# Patient Record
Sex: FEMALE | Race: WHITE | Employment: OTHER | URBAN - METROPOLITAN AREA
[De-identification: names, ages, dates, MRNs, and addresses within clinical notes are randomized per-mention and may not be internally consistent; named-entity substitution may affect disease eponyms.]

---

## 2015-11-09 LAB
CREATININE, EXTERNAL: 1.62
MICROALBUMIN UR TEST STR-MCNC: 9.8 MG/DL

## 2017-02-09 DIAGNOSIS — Z12.39 SCREENING FOR MALIGNANT NEOPLASM OF BREAST: ICD-10-CM

## 2017-02-21 ENCOUNTER — HOSPITAL ENCOUNTER (OUTPATIENT)
Dept: LAB | Age: 57
Discharge: HOME | End: 2017-02-21
Payer: COMMERCIAL

## 2017-02-21 ENCOUNTER — LAB ONLY (OUTPATIENT)
Dept: INTERNAL MEDICINE CLINIC | Age: 57
End: 2017-02-21

## 2017-02-21 DIAGNOSIS — G47.33 OBSTRUCTIVE SLEEP APNEA SYNDROME, MODERATE: ICD-10-CM

## 2017-02-21 DIAGNOSIS — N18.31 CHRONIC KIDNEY DISEASE (CKD) STAGE G3A/A1, MODERATELY DECREASED GLOMERULAR FILTRATION RATE (GFR) BETWEEN 45-59 ML/MIN/1.73 SQUARE METER AND ALBUMINURIA CREATININE RATIO LESS THAN 30 MG/G (HCC): ICD-10-CM

## 2017-02-21 DIAGNOSIS — E05.20 TOXIC MULTINODULAR GOITER: ICD-10-CM

## 2017-02-21 DIAGNOSIS — E11.21 TYPE 2 DIABETES MELLITUS WITH DIABETIC NEPHROPATHY, WITHOUT LONG-TERM CURRENT USE OF INSULIN (HCC): ICD-10-CM

## 2017-02-21 LAB
ALBUMIN SERPL BCP-MCNC: 3.8 G/DL (ref 3.4–5)
ALBUMIN/GLOB SERPL: 1.4 {RATIO} (ref 0.8–1.7)
ALP SERPL-CCNC: 239 U/L (ref 45–117)
ALT SERPL-CCNC: 48 U/L (ref 13–56)
ANION GAP BLD CALC-SCNC: 11 MMOL/L (ref 3–18)
APPEARANCE UR: CLEAR
AST SERPL W P-5'-P-CCNC: 31 U/L (ref 15–37)
BACTERIA URNS QL MICRO: ABNORMAL /HPF
BASOPHILS # BLD AUTO: 0.1 K/UL (ref 0–0.06)
BASOPHILS # BLD: 1 % (ref 0–2)
BILIRUB SERPL-MCNC: 0.5 MG/DL (ref 0.2–1)
BILIRUB UR QL: NEGATIVE
BUN SERPL-MCNC: 35 MG/DL (ref 7–18)
BUN/CREAT SERPL: 19 (ref 12–20)
CALCIUM SERPL-MCNC: 8.8 MG/DL (ref 8.5–10.1)
CHLORIDE SERPL-SCNC: 109 MMOL/L (ref 100–108)
CHOLEST SERPL-MCNC: 148 MG/DL
CO2 SERPL-SCNC: 21 MMOL/L (ref 21–32)
COLOR UR: YELLOW
CREAT SERPL-MCNC: 1.84 MG/DL (ref 0.6–1.3)
DIFFERENTIAL METHOD BLD: ABNORMAL
EOSINOPHIL # BLD: 0.1 K/UL (ref 0–0.4)
EOSINOPHIL NFR BLD: 3 % (ref 0–5)
EPITH CASTS URNS QL MICRO: ABNORMAL /LPF (ref 0–5)
ERYTHROCYTE [DISTWIDTH] IN BLOOD BY AUTOMATED COUNT: 14.6 % (ref 11.6–14.5)
EST. AVERAGE GLUCOSE BLD GHB EST-MCNC: 120 MG/DL
GLOBULIN SER CALC-MCNC: 2.7 G/DL (ref 2–4)
GLUCOSE SERPL-MCNC: 97 MG/DL (ref 74–99)
GLUCOSE UR STRIP.AUTO-MCNC: NEGATIVE MG/DL
HBA1C MFR BLD: 5.8 % (ref 4.2–5.6)
HCT VFR BLD AUTO: 39.8 % (ref 35–45)
HDLC SERPL-MCNC: 42 MG/DL (ref 40–60)
HDLC SERPL: 3.5 {RATIO} (ref 0–5)
HGB BLD-MCNC: 12.6 G/DL (ref 12–16)
HGB UR QL STRIP: NEGATIVE
KETONES UR QL STRIP.AUTO: NEGATIVE MG/DL
LDLC SERPL CALC-MCNC: 89.8 MG/DL (ref 0–100)
LEUKOCYTE ESTERASE UR QL STRIP.AUTO: ABNORMAL
LIPID PROFILE,FLP: NORMAL
LYMPHOCYTES # BLD AUTO: 25 % (ref 21–52)
LYMPHOCYTES # BLD: 1.2 K/UL (ref 0.9–3.6)
MCH RBC QN AUTO: 29 PG (ref 24–34)
MCHC RBC AUTO-ENTMCNC: 31.7 G/DL (ref 31–37)
MCV RBC AUTO: 91.5 FL (ref 74–97)
MONOCYTES # BLD: 0.3 K/UL (ref 0.05–1.2)
MONOCYTES NFR BLD AUTO: 6 % (ref 3–10)
NEUTS SEG # BLD: 3.2 K/UL (ref 1.8–8)
NEUTS SEG NFR BLD AUTO: 65 % (ref 40–73)
NITRITE UR QL STRIP.AUTO: NEGATIVE
PH UR STRIP: 5.5 [PH] (ref 5–8)
PLATELET # BLD AUTO: 220 K/UL (ref 135–420)
PMV BLD AUTO: 9.9 FL (ref 9.2–11.8)
POTASSIUM SERPL-SCNC: 4.3 MMOL/L (ref 3.5–5.5)
PROT SERPL-MCNC: 6.5 G/DL (ref 6.4–8.2)
PROT UR STRIP-MCNC: NEGATIVE MG/DL
RBC # BLD AUTO: 4.35 M/UL (ref 4.2–5.3)
RBC #/AREA URNS HPF: ABNORMAL /HPF (ref 0–5)
SODIUM SERPL-SCNC: 141 MMOL/L (ref 136–145)
SP GR UR REFRACTOMETRY: 1.01 (ref 1–1.03)
TRIGL SERPL-MCNC: 81 MG/DL (ref ?–150)
TSH SERPL DL<=0.05 MIU/L-ACNC: 0.16 UIU/ML (ref 0.36–3.74)
UROBILINOGEN UR QL STRIP.AUTO: 0.2 EU/DL (ref 0.2–1)
VLDLC SERPL CALC-MCNC: 16.2 MG/DL
WBC # BLD AUTO: 4.8 K/UL (ref 4.6–13.2)
WBC URNS QL MICRO: ABNORMAL /HPF (ref 0–4)

## 2017-02-21 PROCEDURE — 84443 ASSAY THYROID STIM HORMONE: CPT | Performed by: FAMILY MEDICINE

## 2017-02-21 PROCEDURE — 80053 COMPREHEN METABOLIC PANEL: CPT | Performed by: FAMILY MEDICINE

## 2017-02-21 PROCEDURE — 36415 COLL VENOUS BLD VENIPUNCTURE: CPT | Performed by: FAMILY MEDICINE

## 2017-02-21 PROCEDURE — 82043 UR ALBUMIN QUANTITATIVE: CPT | Performed by: FAMILY MEDICINE

## 2017-02-21 PROCEDURE — 83036 HEMOGLOBIN GLYCOSYLATED A1C: CPT | Performed by: FAMILY MEDICINE

## 2017-02-21 PROCEDURE — 81001 URINALYSIS AUTO W/SCOPE: CPT | Performed by: FAMILY MEDICINE

## 2017-02-21 PROCEDURE — 80061 LIPID PANEL: CPT | Performed by: FAMILY MEDICINE

## 2017-02-21 PROCEDURE — 85025 COMPLETE CBC W/AUTO DIFF WBC: CPT | Performed by: FAMILY MEDICINE

## 2017-02-22 LAB
CREAT UR-MCNC: 70.41 MG/DL (ref 30–125)
CREATININE, EXTERNAL: 1.79
MICROALBUMIN UR-MCNC: 5.4 MG/DL (ref 0–3)
MICROALBUMIN/CREAT UR-RTO: 77 MG/G (ref 0–30)

## 2017-03-27 ENCOUNTER — HOSPITAL ENCOUNTER (EMERGENCY)
Age: 57
Discharge: HOME | End: 2017-03-27
Attending: EMERGENCY MEDICINE
Payer: COMMERCIAL

## 2017-03-27 ENCOUNTER — APPOINTMENT (OUTPATIENT)
Dept: GENERAL RADIOLOGY | Age: 57
End: 2017-03-27
Attending: EMERGENCY MEDICINE
Payer: COMMERCIAL

## 2017-03-27 VITALS
WEIGHT: 160 LBS | SYSTOLIC BLOOD PRESSURE: 134 MMHG | DIASTOLIC BLOOD PRESSURE: 87 MMHG | HEIGHT: 66 IN | TEMPERATURE: 98 F | OXYGEN SATURATION: 98 % | RESPIRATION RATE: 18 BRPM | HEART RATE: 73 BPM | BODY MASS INDEX: 25.71 KG/M2

## 2017-03-27 DIAGNOSIS — M77.9 BONE SPUR: Primary | ICD-10-CM

## 2017-03-27 DIAGNOSIS — M79.641 HAND PAIN, RIGHT: ICD-10-CM

## 2017-03-27 PROCEDURE — 99282 EMERGENCY DEPT VISIT SF MDM: CPT

## 2017-03-27 PROCEDURE — 73130 X-RAY EXAM OF HAND: CPT

## 2017-03-27 RX ORDER — FUROSEMIDE 20 MG/1
TABLET ORAL
COMMUNITY
End: 2017-04-05 | Stop reason: ALTCHOICE

## 2017-03-27 NOTE — DISCHARGE INSTRUCTIONS
Hand Pain: Care Instructions  Your Care Instructions  Common causes of hand pain are overuse and injuries, such as might happen during sports or home repair projects. Everyday wear and tear, especially as you get older, also can cause hand pain. Most minor hand injuries will heal on their own, and home treatment is usually all you need to do. If you have sudden and severe pain, you may need tests and treatment. Follow-up care is a key part of your treatment and safety. Be sure to make and go to all appointments, and call your doctor if you are having problems. Its also a good idea to know your test results and keep a list of the medicines you take. How can you care for yourself at home? · Take pain medicines exactly as directed. ¨ If the doctor gave you a prescription medicine for pain, take it as prescribed. ¨ If you are not taking a prescription pain medicine, ask your doctor if you can take an over-the-counter medicine. · Rest and protect your hand. Take a break from any activity that may cause pain. · Put ice or a cold pack on your hand for 10 to 20 minutes at a time. Put a thin cloth between the ice and your skin. · Prop up the sore hand on a pillow when you ice it or anytime you sit or lie down during the next 3 days. Try to keep it above the level of your heart. This will help reduce swelling. · If your doctor recommends a sling, splint, or elastic bandage to support your hand, wear it as directed. When should you call for help? Call 911 anytime you think you may need emergency care. For example, call if:  · Your hand turns cool or pale or changes color. Call your doctor now or seek immediate medical care if:  · You cannot move your hand. · Your hand pops, moves out of its normal position, and then returns to its normal position. · You have signs of infection, such as:  ¨ Increased pain, swelling, warmth, or redness. ¨ Red streaks leading from the sore area.   ¨ Pus draining from a place on your hand. ¨ A fever. · Your hand feels numb or tingly. Watch closely for changes in your health, and be sure to contact your doctor if:  · Your hand feels unstable when you try to use it. · You do not get better as expected. · You have any new symptoms, such as swelling. · Bruises from an injury to your hand last longer than 2 weeks. Where can you learn more? Go to http://birdie-juany.info/. Enter R273 in the search box to learn more about \"Hand Pain: Care Instructions. \"  Current as of: May 27, 2016  Content Version: 11.1  © 0354-1652 [employer]. Care instructions adapted under license by [employer] (which disclaims liability or warranty for this information). If you have questions about a medical condition or this instruction, always ask your healthcare professional. [address] any warranty or liability for your use of this information.

## 2017-03-27 NOTE — ED PROVIDER NOTES
HPI Comments: 11:09 AM Ria Mcginnis is a 61 y.o. female with a history of heart disease, R breast cancer, CHF, renal mass, renal failure and gout who presents to ED c/o persistent right hand pain onset 4 weeks ago. Pt explains she has been resting her hand and refraining from repetat faby work but her hand pain continues to worsen and \"there is a big knot  that seems to be getting bigger. \"   Pt reports numbness and tingling. Pt has never experieced symptoms like this in the past and has not seen a doctor for these symptoms. Pt explains \"sometimes my thumb gets stuck in a positon and I have to pull it out. It feels like it catches and I can hear it click. 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 Pt has not taken any medication for the sxs but notes wearing a hand brace that provided no relief. Pt denies recent injury or trauma to hand. No other concerns at this time. PCP: Robby Vasques DO      Patient is a 61 y.o. female presenting with hand pain. Hand Pain    Associated symptoms include numbness.         Past Medical History:   Diagnosis Date    Cancer [facility]) 2000    Right breast ca     Congestive heart failure, unspecified     Gout     H/O total mastectomy of right breast     Heart disease     Hx: UTI (urinary tract infection)     Hypercholesterolemia     Renal cell carcinoma of left kidney ([facility] [address] 12/17/15    Renal mass, left        Past Surgical History:   Procedure Laterality Date    HX CHOLECYSTECTOMY      HX MASTECTOMY Right     HX NEPHRECTOMY Right 3/22/16    Partial x2, Dr. Ariana Bojorquez, [facility]    HX RENAL BIOPSY Right 12/17/15    Ct guided bx, Dr. Nancy Muniz, [facility]         Family History:   Problem Relation Age of Onset    Hypertension Father     Heart Attack Father     Heart Failure Father     Diabetes Father        Social History     Social History    Marital status:      Spouse name: N/A    Number of children: N/A    Years of education: N/A     Occupational History    Not on file. Social History Main Topics    Smoking status: Never Smoker    Smokeless tobacco: Never Used    Alcohol use No    Drug use: No    Sexual activity: Not on file     Other Topics Concern    Not on file     Social History Narrative         ALLERGIES: Codeine; Penicillins; Sulfa (sulfonamide antibiotics); and Sulfur    Review of Systems   Musculoskeletal: Positive for arthralgias. Negative for joint swelling. Skin: Negative for color change. Neurological: Positive for numbness. All other systems reviewed and are negative. Vitals:    03/27/17 1102   BP: 134/87   Pulse: 73   Resp: 18   Temp: 98 °F (36.7 °C)   SpO2: 98%   Weight: 72.6 kg (160 lb)   Height: 5' 6\" (1.676 m)            Physical Exam   Constitutional: She is oriented to person, place, and time. She appears well-developed and well-nourished. HENT:   Head: Normocephalic and atraumatic. Neck: Neck supple. No JVD present. Musculoskeletal: She exhibits no edema. R hand: bony tenderness base of 1st digit with hard mass, non mobile  ROM intact in abduction and adduction  Full ROM in flexion and extension (although clicking noted with DIP flexion)  No snuff box tenderness, no pain with axial loading or distraction  Gross sensation intact  Cap refill 1 sec  Radial pulse 2+   Neurological: She is alert and oriented to person, place, and time. Skin: Skin is warm and dry. No erythema.         MDM  Number of Diagnoses or Management Options  Bone spur:   Hand pain, right:   Diagnosis management comments: 62 y/o female presents with hand pain, suspect bone spur, obtain xr to eval for fx  Pt unable to tolerate nsaids due to renal failure  Will need ortho follow up  Normal neuro exam  No sign of scaphoid fx       Amount and/or Complexity of Data Reviewed  Tests in the radiology section of CPT®: ordered and reviewed      ED Course       Procedures    Vitals:  Patient Vitals for the past 12 hrs:   Temp Pulse Resp BP SpO2   03/27/17 1102 98 °F (36.7 °C) 73 18 134/87 98 %     98% on RA, indicating adequate oxygenation. X-Ray, CT or other radiology findings or impressions:  XR HAND RT MIN 3 V    (Results Pending)   no acute process, noted bone spur    Progress notes, Consult notes or additional Procedure notes:   Discussed results with pt, follow up with ortho. Discussed return precautions. Disposition:  Diagnosis:   1. Bone spur    2. Hand pain, right        Disposition: discharged    Follow-up Information     Follow up With Details Comments [address] Schedule an appointment as soon as possible for a visit  [address] [place], [place] [id number]  [address] [place] [address] EMERGENCY DEPT  As needed, If symptoms worsen [address] [place]  [phone]            Patient's Medications   Start Taking    No medications on file   Continue Taking    ASCORBIC ACID (VITAMIN C) 500 MG TABLET    500 mg. ATORVASTATIN (LIPITOR) 20 MG TABLET    Take 20 mg by mouth daily. BIOTIN 2,500 MCG TAB    Take  by mouth daily. CALCIUM 500 MG TAB    Take 500 mg by mouth daily. CHOLECALCIFEROL (VITAMIN D3) 1,000 UNIT TABLET    Take  by mouth daily. COREG 25 MG TABLET    two (2) times a day. CYANOCOBALAMIN ER 1,000 MCG TABLET    Take 1 Tab by Mouth Once a Day. FUROSEMIDE (LASIX) 20 MG TABLET    Take  by mouth daily as needed. LISINOPRIL (PRINIVIL, ZESTRIL) 5 MG TABLET        MULTIVITAMIN (ONE A DAY) TABLET    Take 1 Tab by mouth daily. These Medications have changed    No medications on file   Stop Taking    FUROSEMIDE (LASIX) 40 MG TABLET    Take 40 mg by mouth daily as needed. If weight gain over 5 lbs. GABAPENTIN (NEURONTIN) 300 MG CAPSULE    Take 1 Cap by mouth two (2) times a day. NITROFURANTOIN, MACROCRYSTAL-MONOHYDRATE, (MACROBID) 100 MG CAPSULE    Take 1 Cap by mouth two (2) times a day. Indications: E. COLI URINARY TRACT INFECTION     Scribe Attestation:     I, Petar Petty, scribing for and in the presence of  Harley Duran, DO March 27, 2017 at 11:22 AM     Physician Attestation:   I personally performed the services described in this documentation, reviewed and edited the documentation which was dictated to the scribe in my presence, and it accurately records my words and actions.  Herminia Gomez,   March 27, 2017     Signed by: Miky Lopez, 03/27/17, 11:12 AM

## 2017-03-27 NOTE — ED NOTES
Donta Arcos is a 61 y.o. female that was discharged in stable condition. The patients diagnosis, condition and treatment were explained to  patient and aftercare instructions were given. The patient verbalized understanding. Patient armband removed and shredded.

## 2017-03-27 NOTE — ED TRIAGE NOTES
Patient states pain to palm and thumb of right hand. States knot to right palm and difficulty using thumb.

## 2017-04-05 ENCOUNTER — OFFICE VISIT (OUTPATIENT)
Dept: INTERNAL MEDICINE CLINIC | Age: 57
End: 2017-04-05

## 2017-04-05 VITALS
OXYGEN SATURATION: 98 % | BODY MASS INDEX: 26.52 KG/M2 | TEMPERATURE: 98.3 F | DIASTOLIC BLOOD PRESSURE: 65 MMHG | SYSTOLIC BLOOD PRESSURE: 113 MMHG | HEIGHT: 66 IN | RESPIRATION RATE: 16 BRPM | HEART RATE: 77 BPM | WEIGHT: 165 LBS

## 2017-04-05 DIAGNOSIS — E11.21 TYPE 2 DIABETES MELLITUS WITH DIABETIC NEPHROPATHY, WITHOUT LONG-TERM CURRENT USE OF INSULIN (HCC): Primary | ICD-10-CM

## 2017-04-05 DIAGNOSIS — N18.4 CHRONIC KIDNEY DISEASE (CKD) STAGE G4/A1, SEVERELY DECREASED GLOMERULAR FILTRATION RATE (GFR) BETWEEN 15-29 ML/MIN/1.73 SQUARE METER AND ALBUMINURIA CREATININE RATIO LESS THAN 30 MG/G (HCC): ICD-10-CM

## 2017-04-05 DIAGNOSIS — T45.1X5A CHEMOTHERAPY INDUCED CARDIOMYOPATHY (HCC): ICD-10-CM

## 2017-04-05 DIAGNOSIS — I42.7 CHEMOTHERAPY INDUCED CARDIOMYOPATHY (HCC): ICD-10-CM

## 2017-04-05 DIAGNOSIS — R60.9 PERIPHERAL EDEMA: ICD-10-CM

## 2017-04-05 DIAGNOSIS — G47.33 OBSTRUCTIVE SLEEP APNEA SYNDROME, MODERATE: ICD-10-CM

## 2017-04-05 RX ORDER — FUROSEMIDE 40 MG/1
TABLET ORAL
Refills: 0 | COMMUNITY
Start: 2017-03-01 | End: 2019-11-12

## 2017-04-05 NOTE — MR AVS SNAPSHOT
Visit Information Date & Time Provider Department Dept. Phone Encounter #  
 4/5/2017 11:30 AM Nhung Soriano DO [facility] 8 [id number] Follow-up Instructions Return in about 4 months (around 8/5/2017) for follow up w/ labs. Your Appointments 7/12/2017  9:15 AM  
ESTABLISHED PATIENT with Sheri Castro MD  
[facility] Appt Note: 6mo F/U, Rev Imaging- to be done at [place] [address]- to be done at PCP or Neph.  
 [address] 
[address] [facility] [address]  
[address] [facility] [address] [facility] [address] Upcoming Health Maintenance Date Due  
 FOOT EXAM Q1 4/22/1969 Pneumococcal 19-64 Highest Risk (1 of 3 - PCV13) 4/22/1978 DTaP/Tdap/Td series (1 - Tdap) 4/22/1980 PAP AKA CERVICAL CYTOLOGY 4/22/1980 FOBT Q 1 YEAR AGE 50-75 4/22/2009 HEMOGLOBIN A1C Q6M 8/21/2017 EYE EXAM RETINAL OR DILATED Q1 12/19/2017 MICROALBUMIN Q1 2/21/2018 LIPID PANEL Q1 2/21/2018 BREAST CANCER SCRN MAMMOGRAM 11/10/2018 Allergies as of 4/5/2017  Review Complete On: 4/5/2017 By: Trey Schmidt LPN Severity Noted Reaction Type Reactions Codeine  10/19/2015    Not Reported This Time, Other (comments), Swelling Throat Swelling Penicillins  07/31/2015    Angioedema, Other (comments) N/V, swelling Sulfa (Sulfonamide Antibiotics)  04/20/2016    Other (comments)  
 itching Sulfur  07/31/2015    Hives Current Immunizations  Never Reviewed Name Date Influenza Vaccine (Quad) PF 11/17/2016 Not reviewed this visit You Were Diagnosed With   
  
 Codes Comments Type 2 diabetes mellitus with diabetic nephropathy, without long-term current use of insulin (HCC)    -  Primary ICD-10-CM: E11.21 
ICD-9-CM: 250.40, 583.81  Obstructive sleep apnea syndrome, moderate     ICD-10-CM: G47.33 
ICD-9-CM: 327.23   
 Chronic kidney disease (CKD) stage G3a/A1, moderately decreased glomerular filtration rate (GFR) between 45-59 mL/min/1.73 square meter and albuminuria creatinine ratio less than 30 mg/g     ICD-10-CM: N18.3 ICD-9-CM: 969. 3 Peripheral edema     ICD-10-CM: R60.9 ICD-9-CM: 846. 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 
ICD-9-CM: 425.9, E933.1 Vitals BP Pulse Temp Resp Height(growth percentile) Weight(growth percentile) 113/65 (BP 1 Location: Left arm) 77 98.3 °F (36.8 °C) (Oral) 16 5' 6\" (1.676 m) 165 lb (74.8 kg) SpO2 BMI OB Status Smoking Status 98% 26.63 kg/m2 Postmenopausal Never Smoker Vitals History BMI and BSA Data Body Mass Index Body Surface Area  
 26.63 kg/m 2 1.87 m 2 Preferred Pharmacy Pharmacy Name Phone [address], [address] [place] [address] [phone] Your Updated Medication List  
  
   
This list is accurate as of: 4/5/17 12:42 PM.  Always use your most recent med list.  
  
  
  
  
 ascorbic acid (vitamin C) 500 mg tablet Commonly known as:  VITAMIN C  
500 mg.  
  
 atorvastatin 20 mg tablet Commonly known as:  LIPITOR Take 20 mg by mouth daily. biotin 2,500 mcg Tab Take  by mouth daily. calcium 500 mg Tab Take 500 mg by mouth daily. COREG 25 mg tablet Generic drug:  carvedilol  
two (2) times a day. cyanocobalamin ER 1,000 mcg tablet Take 1 Tab by Mouth Once a Day. furosemide 40 mg tablet Commonly known as:  LASIX TAKE 1 TABLET BY MOUTH ONCE A DAY AS NEEDED  
  
 lisinopril 5 mg tablet Commonly known as:  Dorean Peeks Take 5 mg by mouth two (2) times a day. multivitamin tablet Commonly known as:  ONE A DAY Take 1 Tab by mouth daily. VITAMIN D3 1,000 unit tablet Generic drug:  cholecalciferol Take  by mouth daily. Follow-up Instructions Return in about 4 months (around 8/5/2017) for follow up w/ labs. Patient Instructions A Healthy Lifestyle: Care Instructions Your Care Instructions A healthy lifestyle can help you feel good, stay at a healthy weight, and have plenty of energy for both work and play. A healthy lifestyle is something you can share with your whole family. A healthy lifestyle also can lower your risk for serious health problems, such as high blood pressure, heart disease, and diabetes. You can follow a few steps listed below to improve your health and the health of your family. Follow-up care is a key part of your treatment and safety. Be sure to make and go to all appointments, and call your doctor if you are having problems. Its also a good idea to know your test results and keep a list of the medicines you take. How can you care for yourself at home? · Do not eat too much sugar, fat, or fast foods. You can still have dessert and treats now and then. The goal is moderation. · Start small to improve your eating habits. Pay attention to portion sizes, drink less juice and soda pop, and eat more fruits and vegetables. ¨ Eat a healthy amount of food. A 3-ounce serving of meat, for example, is about the size of a deck of cards. Fill the rest of your plate with vegetables and whole grains. ¨ Limit the amount of soda and sports drinks you have every day. Drink more water when you are thirsty. ¨ Eat at least 5 servings of fruits and vegetables every day. It may seem like a lot, but it is not hard to reach this goal. A serving or helping is 1 piece of fruit, 1 cup of vegetables, or 2 cups of leafy, raw vegetables. Have an apple or some carrot sticks as an afternoon snack instead of a candy bar. Try to have fruits and/or vegetables at every meal. 
· Make exercise part of your daily routine. You may want to start with simple activities, such as walking, bicycling, or slow swimming.  Try to be active 30 to 60 minutes every day. You do not need to do all 30 to 60 minutes all at once. For example, you can exercise 3 times a day for 10 or 20 minutes. Moderate exercise is safe for most people, but it is always a good idea to talk to your doctor before starting an exercise program. 
· Keep moving. Alexismica Taolock the lawn, work in the garden, or "[employer] Take the stairs instead of the elevator at work. · If you smoke, quit. People who smoke have an increased risk for heart attack, stroke, cancer, and other lung illnesses. Quitting is hard, but there are ways to boost your chance of quitting tobacco for good. ¨ Use nicotine gum, patches, or lozenges. ¨ Ask your doctor about stop-smoking programs and medicines. ¨ Keep trying. In addition to reducing your risk of diseases in the future, you will notice some benefits soon after you stop using tobacco. If you have shortness of breath or asthma symptoms, they will likely get better within a few weeks after you quit. · Limit how much alcohol you drink. Moderate amounts of alcohol (up to 2 drinks a day for men, 1 drink a day for women) are okay. But drinking too much can lead to liver problems, high blood pressure, and other health problems. Family health If you have a family, there are many things you can do together to improve your health. · Eat meals together as a family as often as possible. · Eat healthy foods. This includes fruits, vegetables, lean meats and dairy, and whole grains. · Include your family in your fitness plan. Most people think of activities such as jogging or tennis as the way to fitness, but there are many ways you and your family can be more active. Anything that makes you breathe hard and gets your heart pumping is exercise. Here are some tips: 
¨ Walk to do errands or to take your child to school or the bus. ¨ Go for a family bike ride after dinner instead of watching TV. Where can you learn more? Go to http://birdie-juany.info/. Enter Y715 in the search box to learn more about \"A Healthy Lifestyle: Care Instructions. \" Current as of: July 26, 2016 Content Version: 11.2 © 9546-3455 [employer]. Care instructions adapted under license by [employer] (which disclaims liability or warranty for this information). If you have questions about a medical condition or this instruction, always ask your healthcare professional. [address] any warranty or liability for your use of this information. Introducing [facility]! Dear Dima Palma: Thank you for requesting a Health Informatics account. Our records indicate that you already have an active Health Informatics account. You can access your account anytime at [email or web address] Did you know that you can access your hospital and ER discharge instructions at any time in Health Informatics? You can also review all of your test results from your hospital stay or ER visit. Additional Information If you have questions, please visit the Frequently Asked Questions section of the Health Informatics website at [email or web address] Remember, Health Informatics is NOT to be used for urgent needs. For medical emergencies, dial 911. Now available from your iPhone and Android! Please provide this summary of care documentation to your next provider. Your primary care clinician is listed as Nhung Soriano. If you have any questions after today's visit, please call [phone].

## 2017-04-05 NOTE — PATIENT INSTRUCTIONS

## 2017-04-05 NOTE — PROGRESS NOTES
Pt is here for pre-op exam for R thumb trigger release, cyst excisional biopsy by Dr Kd Anton, Surgery date 4/19/17    Do you have an advance directive no  Request Pt bring a copy of advance directive for scanning. Do you want information on an advance directive declined    1. Have you been to the ER, urgent care clinic since your last visit? Hospitalized since your last visit? Yes [facility] ED 3/17 hand pain    2. Have you seen or consulted any other health care providers outside of the [address] [place] [address] since your last visit? Include any pap smears or colon screening.  Yes Dr Art Zhang 3/17

## 2017-04-10 ENCOUNTER — TELEPHONE (OUTPATIENT)
Dept: INTERNAL MEDICINE CLINIC | Age: 57
End: 2017-04-10

## 2017-04-10 NOTE — TELEPHONE ENCOUNTER
Jaz Parish from Dr. Jeanmarie Canseco office called asking for the medical clearance notes to be sent over. Pt will be coming into their office this Thursday.     Please fax to: [phone]

## 2017-04-10 NOTE — TELEPHONE ENCOUNTER
Dr Shannan Martell [address] not is not complete. Pavan Coleman @ Dr Gallegos [place] office aware Dr Sampson Ruff is out of town on a family emergency & will not return until Wednesday, April 12th. Pt's surgery is scheduled April 19th. Will fax completed note as soon as it is available.

## 2017-04-13 ENCOUNTER — HOSPITAL ENCOUNTER (OUTPATIENT)
Dept: LAB | Age: 57
Discharge: HOME | End: 2017-04-13
Payer: COMMERCIAL

## 2017-04-13 ENCOUNTER — HOSPITAL ENCOUNTER (OUTPATIENT)
Dept: GENERAL RADIOLOGY | Age: 57
Discharge: HOME | End: 2017-04-13
Payer: COMMERCIAL

## 2017-04-13 DIAGNOSIS — I10 BENIGN HYPERTENSION: ICD-10-CM

## 2017-04-13 DIAGNOSIS — I10 HYPERTENSION, ESSENTIAL: ICD-10-CM

## 2017-04-13 PROBLEM — N18.4 CHRONIC KIDNEY DISEASE (CKD) STAGE G4/A1, SEVERELY DECREASED GLOMERULAR FILTRATION RATE (GFR) BETWEEN 15-29 ML/MIN/1.73 SQUARE METER AND ALBUMINURIA CREATININE RATIO LESS THAN 30 MG/G (HCC): Status: ACTIVE | Noted: 2017-04-13

## 2017-04-13 LAB
ALBUMIN SERPL BCP-MCNC: 3.9 G/DL (ref 3.4–5)
ALBUMIN/GLOB SERPL: 1.5 {RATIO} (ref 0.8–1.7)
ALP SERPL-CCNC: 231 U/L (ref 45–117)
ALT SERPL-CCNC: 40 U/L (ref 13–56)
ANION GAP BLD CALC-SCNC: 7 MMOL/L (ref 3–18)
AST SERPL W P-5'-P-CCNC: 24 U/L (ref 15–37)
BILIRUB SERPL-MCNC: 0.5 MG/DL (ref 0.2–1)
BUN SERPL-MCNC: 34 MG/DL (ref 7–18)
BUN/CREAT SERPL: 18 (ref 12–20)
CALCIUM SERPL-MCNC: 8.7 MG/DL (ref 8.5–10.1)
CHLORIDE SERPL-SCNC: 109 MMOL/L (ref 100–108)
CO2 SERPL-SCNC: 26 MMOL/L (ref 21–32)
CREAT SERPL-MCNC: 1.89 MG/DL (ref 0.6–1.3)
ERYTHROCYTE [DISTWIDTH] IN BLOOD BY AUTOMATED COUNT: 14 % (ref 11.6–14.5)
GLOBULIN SER CALC-MCNC: 2.6 G/DL (ref 2–4)
GLUCOSE SERPL-MCNC: 114 MG/DL (ref 74–99)
HCT VFR BLD AUTO: 39.2 % (ref 35–45)
HGB BLD-MCNC: 12.6 G/DL (ref 12–16)
MCH RBC QN AUTO: 29.1 PG (ref 24–34)
MCHC RBC AUTO-ENTMCNC: 32.1 G/DL (ref 31–37)
MCV RBC AUTO: 90.5 FL (ref 74–97)
PLATELET # BLD AUTO: 208 K/UL (ref 135–420)
PMV BLD AUTO: 9.7 FL (ref 9.2–11.8)
POTASSIUM SERPL-SCNC: 4.5 MMOL/L (ref 3.5–5.5)
PROT SERPL-MCNC: 6.5 G/DL (ref 6.4–8.2)
RBC # BLD AUTO: 4.33 M/UL (ref 4.2–5.3)
SODIUM SERPL-SCNC: 142 MMOL/L (ref 136–145)
WBC # BLD AUTO: 5.4 K/UL (ref 4.6–13.2)

## 2017-04-13 PROCEDURE — 71020 XR CHEST PA LAT: CPT

## 2017-04-13 NOTE — PROGRESS NOTES
HISTORY OF PRESENT ILLNESS  Abiodun Menendez is a 61 y.o. female. HPI Comments: 61year old established patient in today for preop clearance. Surgery is on 4/19/17, by Dr. Natasha Mello. Patient is due to have a right thumb trigger release with excisional cyst biopsy. Hx of CKD stage 3, diabetes, h/o Cardiomyopathy, CLARENCE, Toxic multinodular goiter, gout and recent diagnosis of right renal cell carcinoma, clear cell type s/p resection. Patient says she walks in the mornings with out any difficulty, she does her own house work including vacuuming with no WYNNE, she has no difficulty going up stairs or walking the grocery store. She has no concerns and reports that she is doing well. She is taking her medications with no adverse side effects, she is requesting refills today. She denies CP, SOB, dyspnea, edema, N/T or myalgias. Patient has had a 10 lbs weight gain    Allergies   Allergen Reactions    Codeine Not Reported This Time, Other (comments) and Swelling     Throat Swelling    Penicillins Angioedema and Other (comments)     N/V, swelling    Sulfa (Sulfonamide Antibiotics) Other (comments)     itching    Sulfur Hives       Past Medical History:   Diagnosis Date    Cancer [facility]) 2000    Right breast ca     Congestive heart failure, unspecified     Gout     H/O total mastectomy of right breast     Heart disease     Hx: UTI (urinary tract infection)     Hypercholesterolemia     Renal cell carcinoma of left kidney ([facility] [address] 12/17/15    Renal mass, left        Family History   Problem Relation Age of Onset    Hypertension Father     Heart Attack Father     Heart Failure Father     Diabetes Father        Social History   Substance Use Topics    Smoking status: Never Smoker    Smokeless tobacco: Never Used    Alcohol use No        Current Outpatient Prescriptions   Medication Sig    lisinopril (PRINIVIL, ZESTRIL) 5 mg tablet Take 5 mg by mouth two (2) times a day.     ascorbic acid (VITAMIN C) 500 mg tablet 500 mg.  cyanocobalamin ER 1,000 mcg tablet Take 1 Tab by Mouth Once a Day.  cholecalciferol (VITAMIN D3) 1,000 unit tablet Take  by mouth daily.  multivitamin (ONE A DAY) tablet Take 1 Tab by mouth daily.  biotin 2,500 mcg tab Take  by mouth daily.  atorvastatin (LIPITOR) 20 mg tablet Take 20 mg by mouth daily.  COREG 25 mg tablet two (2) times a day.  calcium 500 mg tab Take 500 mg by mouth daily.  furosemide (LASIX) 40 mg tablet TAKE 1 TABLET BY MOUTH ONCE A DAY AS NEEDED     No current facility-administered medications for this visit. Past Surgical History:   Procedure Laterality Date    HX CHOLECYSTECTOMY      HX MASTECTOMY Right     HX NEPHRECTOMY Right 3/22/16    Partial x2, Dr. Stephanie Guan, [facility]    HX RENAL BIOPSY Right 12/17/15    Ct guided bx, Dr. Emmett Kelly, [facility]       ROS   Constitutional: Negative for fever and chills. HENT: Negative for tinnitus. Eyes: Negative for blurred vision and double vision. Respiratory: Negative for cough and shortness of breath. Cardiovascular: Negative for chest pain and leg swelling. Gastrointestinal: Negative for nausea, vomiting and diarrhea. Genitourinary: Negative for dysuria and flank pain. Musculoskeletal: Negative for myalgias and joint pain. Neurological: Negative for dizziness, tremors, sensory change, speech change, focal weakness and headaches. Psychiatric/Behavioral: Negative for depression and suicidal ideas. Visit Vitals    /65 (BP 1 Location: Left arm)    Pulse 77    Temp 98.3 °F (36.8 °C) (Oral)    Resp 16    Ht 5' 6\" (1.676 m)    Wt 165 lb (74.8 kg)    SpO2 98%    BMI 26.63 kg/m2     Physical Exam  Constitutional: She is oriented to person, place, and time and well-developed, well-nourished, and in no distress. Head: Normocephalic and atraumatic. Right Ear: External ear normal.   Left Ear: External ear normal.   Eyes: Pupils are equal, round, and reactive to light.    Neck: Normal range of motion. Cardiovascular: Normal rate, regular rhythm, normal heart sounds and intact distal pulses. No murmur heard. Pulmonary/Chest: Effort normal and breath sounds normal. No respiratory distress. Musculoskeletal: Normal range of motion. She exhibits trace b/l edema. Neurological: She is alert and oriented to person, place, and time. Gait normal.   Skin: Skin is warm and dry. Psychiatric: Mood, memory, affect and judgment normal.         ASSESSMENT and PLAN    ICD-10-CM ICD-9-CM    1. Type 2 diabetes mellitus with diabetic nephropathy, without long-term current use of insulin ([facility]) E11.21 250.40      583.81    2. Obstructive sleep apnea syndrome, moderate G47.33 327.23    3. Peripheral edema R60.9 782.3    4. Chemotherapy induced cardiomyopathy ([facility]) I42.7 425.9     T45. 1X5A E933.1    5. Chronic kidney disease (CKD) stage G4/A1, severely decreased glomerular filtration rate (GFR) between 15-29 mL/min/1.73 square meter and albuminuria creatinine ratio less than 30 mg/g ([facility]) N18.4 585.4      -DM- A1c with good control  -CLARENCE- controlled   -CKD stage 3- stable currently  Peripheral edema- improved   Cardiomyopathy stable with EF of 50% on recent ECHO  -Advised ctn current therapeutic treatment regimen  -Patient is low risk for low risk surgery currently. She is medically optimized and cleared. Cardiology note reviewed in preparation for this note. -RTC 3 months w/ labs    -Advised patient that without previous EKG and with abnormal EKG noted on pre-operative exam we would need to have her cardiologist Dr. Gus Toro give an opinion. Since the completion of this note patient's cardiologist Dr. Gus Toro has reported that the EKG is unchanged from all of her previous EKG. He has cleared her for surgery from cardiovascular perspective. -Given patient hx of intense exercise without cardiac symptoms this is reassuring.  -Patient is currently intermediate risk for intermediate risk surgery. Patient is cleared for surgery. -RTC 3 mos for chronic illness management. Follow-up Disposition:   Return if symptoms worsen or fail to improve. Risk and benefits of new medication discussed in detail, patient was given the opportunity to ask questions  AVS provided  reviewed diet, exercise and weight control   Alarm signals discussed. ER precautions   Plan of care reviewed with patient. Understanding verbalized and they are in agreement with plan of care.      Renita Peng DO

## 2017-04-14 ENCOUNTER — TELEPHONE (OUTPATIENT)
Dept: INTERNAL MEDICINE CLINIC | Age: 57
End: 2017-04-14

## 2017-04-14 LAB
ATRIAL RATE: 64 BPM
CALCULATED P AXIS, ECG09: 36 DEGREES
CALCULATED R AXIS, ECG10: -45 DEGREES
CALCULATED T AXIS, ECG11: 22 DEGREES
DIAGNOSIS, 93000: NORMAL
P-R INTERVAL, ECG05: 180 MS
Q-T INTERVAL, ECG07: 426 MS
QRS DURATION, ECG06: 110 MS
QTC CALCULATION (BEZET), ECG08: 439 MS
VENTRICULAR RATE, ECG03: 64 BPM

## 2017-04-19 ENCOUNTER — HOSPITAL ENCOUNTER (OUTPATIENT)
Dept: LAB | Age: 57
Discharge: HOME | End: 2017-04-19
Payer: COMMERCIAL

## 2017-04-19 PROCEDURE — 88304 TISSUE EXAM BY PATHOLOGIST: CPT | Performed by: ORTHOPAEDIC SURGERY

## 2017-04-25 ENCOUNTER — TELEPHONE (OUTPATIENT)
Dept: INTERNAL MEDICINE CLINIC | Age: 57
End: 2017-04-25

## 2017-04-25 NOTE — TELEPHONE ENCOUNTER
Patient called in and stated that she has an appointment scheduled for tomorrow but the patient thinks that she has an UTI and wanted to know if she could stop by the office and do a urinalysis so that she can get some medication called in today. Patient stated that Dr. Sampson Ruff know that she has CKD stage 4. Please advise.

## 2017-04-26 ENCOUNTER — OFFICE VISIT (OUTPATIENT)
Dept: INTERNAL MEDICINE CLINIC | Age: 57
End: 2017-04-26

## 2017-04-26 ENCOUNTER — HOSPITAL ENCOUNTER (OUTPATIENT)
Dept: LAB | Age: 57
Discharge: HOME | End: 2017-04-26
Payer: COMMERCIAL

## 2017-04-26 VITALS
HEIGHT: 66 IN | HEART RATE: 80 BPM | WEIGHT: 168 LBS | BODY MASS INDEX: 27 KG/M2 | RESPIRATION RATE: 16 BRPM | TEMPERATURE: 97.3 F | DIASTOLIC BLOOD PRESSURE: 89 MMHG | SYSTOLIC BLOOD PRESSURE: 135 MMHG | OXYGEN SATURATION: 98 %

## 2017-04-26 DIAGNOSIS — N39.0 URINARY TRACT INFECTION WITH HEMATURIA, SITE UNSPECIFIED: ICD-10-CM

## 2017-04-26 DIAGNOSIS — R35.0 FREQUENCY OF URINATION: ICD-10-CM

## 2017-04-26 DIAGNOSIS — N39.0 URINARY TRACT INFECTION WITH HEMATURIA, SITE UNSPECIFIED: Primary | ICD-10-CM

## 2017-04-26 DIAGNOSIS — R31.9 URINARY TRACT INFECTION WITH HEMATURIA, SITE UNSPECIFIED: ICD-10-CM

## 2017-04-26 DIAGNOSIS — R31.9 URINARY TRACT INFECTION WITH HEMATURIA, SITE UNSPECIFIED: Primary | ICD-10-CM

## 2017-04-26 LAB
BILIRUB UR QL STRIP: NEGATIVE
GLUCOSE UR-MCNC: NEGATIVE MG/DL
KETONES P FAST UR STRIP-MCNC: NEGATIVE MG/DL
PH UR STRIP: 6 [PH] (ref 4.6–8)
PROT UR QL STRIP: NORMAL MG/DL
SP GR UR STRIP: 1.01 (ref 1–1.03)
UA UROBILINOGEN AMB POC: NORMAL (ref 0.2–1)
URINALYSIS CLARITY POC: NORMAL
URINALYSIS COLOR POC: YELLOW
URINE BLOOD POC: NORMAL
URINE LEUKOCYTES POC: NORMAL
URINE NITRITES POC: NEGATIVE

## 2017-04-26 PROCEDURE — 87186 SC STD MICRODIL/AGAR DIL: CPT | Performed by: FAMILY MEDICINE

## 2017-04-26 PROCEDURE — 87077 CULTURE AEROBIC IDENTIFY: CPT | Performed by: FAMILY MEDICINE

## 2017-04-26 PROCEDURE — 87086 URINE CULTURE/COLONY COUNT: CPT | Performed by: FAMILY MEDICINE

## 2017-04-26 RX ORDER — OXYCODONE HYDROCHLORIDE 5 MG/1
TABLET ORAL
Refills: 0 | COMMUNITY
Start: 2017-04-18 | End: 2017-06-07 | Stop reason: ALTCHOICE

## 2017-04-26 RX ORDER — LEVOFLOXACIN 750 MG/1
750 TABLET ORAL DAILY
Qty: 5 TAB | Refills: 0 | Status: SHIPPED | OUTPATIENT
Start: 2017-04-26 | End: 2017-05-01

## 2017-04-26 RX ORDER — ONDANSETRON 8 MG/1
8 TABLET, ORALLY DISINTEGRATING ORAL
Status: ON HOLD | COMMUNITY
Start: 2016-03-25 | End: 2020-01-16 | Stop reason: SDUPTHER

## 2017-04-26 NOTE — MR AVS SNAPSHOT
Visit Information Date & Time Provider Department Dept. Phone Encounter #  
 4/26/2017  8:15 AM Maxi Chris DO [facility] at [facility] [phone] Follow-up Instructions Return if symptoms worsen or fail to improve. Your Appointments 7/12/2017  9:15 AM  
ESTABLISHED PATIENT with Fadumo Mondragon MD  
[facility] ([facility]) Appt Note: 6mo F/U, Rev Imaging- to be done at [place] [address]- to be done at PCP or Neph.  
 [address] 
[address] [place] [address]  
[address] [place] [address] Upcoming Health Maintenance Date Due  
 FOOT EXAM Q1 4/22/1969 Pneumococcal 19-64 Highest Risk (1 of 3 - PCV13) 4/22/1978 DTaP/Tdap/Td series (1 - Tdap) 4/22/1980 PAP AKA CERVICAL CYTOLOGY 4/22/1980 FOBT Q 1 YEAR AGE 50-75 4/22/2009 HEMOGLOBIN A1C Q6M 8/21/2017 EYE EXAM RETINAL OR DILATED Q1 12/19/2017 MICROALBUMIN Q1 2/21/2018 LIPID PANEL Q1 2/21/2018 BREAST CANCER SCRN MAMMOGRAM 11/10/2018 Allergies as of 4/26/2017  Review Complete On: 4/26/2017 By: aMxi Chris DO Severity Noted Reaction Type Reactions Codeine  10/19/2015    Not Reported This Time, Other (comments), Swelling Throat Swelling Penicillins  07/31/2015    Angioedema, Other (comments) N/V, swelling Sulfa (Sulfonamide Antibiotics)  04/20/2016    Other (comments)  
 itching Sulfur  07/31/2015    Hives Current Immunizations  Never Reviewed Name Date Influenza Vaccine (Quad) PF 11/17/2016 Not reviewed this visit You Were Diagnosed With   
  
 Codes Comments Urinary tract infection with hematuria, site unspecified    -  Primary ICD-10-CM: N39.0, R31.9 ICD-9-CM: 599.0 Frequency of urination     ICD-10-CM: R35.0 ICD-9-CM: 788.41 Vitals BP Pulse Temp Resp Height(growth percentile) Weight(growth percentile) 135/89 80 97.3 °F (36.3 °C) (Oral) 16 5' 6\" (1.676 m) 168 lb (76.2 kg) SpO2 BMI OB Status Smoking Status 98% 27.12 kg/m2 Postmenopausal Never Smoker Vitals History BMI and BSA Data Body Mass Index Body Surface Area  
 27.12 kg/m 2 1.88 m 2 Preferred Pharmacy Pharmacy Name Phone [address], [address] [place] [address] [phone] Your Updated Medication List  
  
   
This list is accurate as of: 4/26/17  9:13 AM.  Always use your most recent med list.  
  
  
  
  
 ascorbic acid (vitamin C) 500 mg tablet Commonly known as:  VITAMIN C  
500 mg.  
  
 atorvastatin 20 mg tablet Commonly known as:  LIPITOR Take 20 mg by mouth daily. biotin 2,500 mcg Tab Take  by mouth daily. calcium 500 mg Tab Take 500 mg by mouth daily. COREG 25 mg tablet Generic drug:  carvedilol  
two (2) times a day. cyanocobalamin ER 1,000 mcg tablet Take 1 Tab by Mouth Once a Day. furosemide 40 mg tablet Commonly known as:  LASIX TAKE 1 TABLET BY MOUTH ONCE A DAY AS NEEDED  
  
 levoFLOXacin 750 mg tablet Commonly known as:  Ania Camps Take 1 Tab by mouth daily for 5 days. lisinopril 5 mg tablet Commonly known as:  Kae Jorge Take 5 mg by mouth two (2) times a day. multivitamin tablet Commonly known as:  ONE A DAY Take 1 Tab by mouth daily. ondansetron 8 mg disintegrating tablet Commonly known as:  ZOFRAN ODT  
8 mg.  
  
 oxyCODONE IR 5 mg immediate release tablet Commonly known as:  Angelesloki Henderson  
4/18/17 VITAMIN D3 1,000 unit tablet Generic drug:  cholecalciferol Take  by mouth daily. Prescriptions Sent to Pharmacy Refills  
 levoFLOXacin (LEVAQUIN) 750 mg tablet 0 Sig: Take 1 Tab by mouth daily for 5 days. Class: Normal  
 Pharmacy: [facility], [address] [place] [address] #: [phone]  Route: Oral  
  
 We Performed the Following AMB POC URINALYSIS DIP STICK AUTO W/O MICRO [25701 CPT(R)] Follow-up Instructions Return if symptoms worsen or fail to improve. To-Do List   
 04/26/2017 Microbiology:  CULTURE, URINE Patient Instructions Urinary Tract Infection in Women: Care Instructions Your Care Instructions A urinary tract infection, or UTI, is a general term for an infection anywhere between the kidneys and the urethra (where urine comes out). Most UTIs are bladder infections. They often cause pain or burning when you urinate. UTIs are caused by bacteria and can be cured with antibiotics. Be sure to complete your treatment so that the infection goes away. Follow-up care is a key part of your treatment and safety. Be sure to make and go to all appointments, and call your doctor if you are having problems. It's also a good idea to know your test results and keep a list of the medicines you take. How can you care for yourself at home? · Take your antibiotics as directed. Do not stop taking them just because you feel better. You need to take the full course of antibiotics. · Drink extra water and other fluids for the next day or two. This may help wash out the bacteria that are causing the infection. (If you have kidney, heart, or liver disease and have to limit fluids, talk with your doctor before you increase your fluid intake.) · Avoid drinks that are carbonated or have caffeine. They can irritate the bladder. · Urinate often. Try to empty your bladder each time. · To relieve pain, take a hot bath or lay a heating pad set on low over your lower belly or genital area. Never go to sleep with a heating pad in place. To prevent UTIs · Drink plenty of water each day. This helps you urinate often, which clears bacteria from your system. (If you have kidney, heart, or liver disease and have to limit fluids, talk with your doctor before you increase your fluid intake.) · Urinate when you need to. · Urinate right after you have sex. · Change sanitary pads often. · Avoid douches, bubble baths, feminine hygiene sprays, and other feminine hygiene products that have deodorants. · After going to the bathroom, wipe from front to back. When should you call for help? Call your doctor now or seek immediate medical care if: · Symptoms such as fever, chills, nausea, or vomiting get worse or appear for the first time. · You have new pain in your back just below your rib cage. This is called flank pain. · There is new blood or pus in your urine. · You have any problems with your antibiotic medicine. Watch closely for changes in your health, and be sure to contact your doctor if: 
· You are not getting better after taking an antibiotic for 2 days. · Your symptoms go away but then come back. Where can you learn more? Go to http://birdie-juany.info/. Enter P491 in the search box to learn more about \"Urinary Tract Infection in Women: Care Instructions. \" Current as of: November 28, 2016 Content Version: 11.2 © 7889-5449 [employer]. Care instructions adapted under license by [employer] (which disclaims liability or warranty for this information). If you have questions about a medical condition or this instruction, always ask your healthcare professional. [address] any warranty or liability for your use of this information. Introducing [facility] & HEALTH SERVICES! Dear Hitesh Guzmán: Thank you for requesting a Woopie account. Our records indicate that you already have an active Woopie account. You can access your account anytime at [email or web address] Did you know that you can access your hospital and ER discharge instructions at any time in Woopie? You can also review all of your test results from your hospital stay or ER visit. Additional Information If you have questions, please visit the Frequently Asked Questions section of the [employer] website at [email or web address] Remember, Meilimei is NOT to be used for urgent needs. For medical emergencies, dial 911. Now available from your iPhone and Android! Please provide this summary of care documentation to your next provider. Your primary care clinician is listed as Nasra Pritchard. If you have any questions after today's visit, please call [phone].

## 2017-04-26 NOTE — PROGRESS NOTES
Pt is here for possible uti, pressure, yjis week. Took OZO OTC    Do you have an advance directive no  Request Pt bring a copy of advance directive for scanning. Do you want information on an advance directive declined         1. Have you been to the ER, urgent care clinic since your last visit? Hospitalized since your last visit? No    2. Have you seen or consulted any other health care providers outside of the [address] [place] [address] since your last visit? Include any pap smears or colon screening.    Yes Reason for visit: Dr Allyson Alejo

## 2017-04-26 NOTE — PATIENT INSTRUCTIONS
Urinary Tract Infection in Women: Care Instructions  Your Care Instructions    A urinary tract infection, or UTI, is a general term for an infection anywhere between the kidneys and the urethra (where urine comes out). Most UTIs are bladder infections. They often cause pain or burning when you urinate. UTIs are caused by bacteria and can be cured with antibiotics. Be sure to complete your treatment so that the infection goes away. Follow-up care is a key part of your treatment and safety. Be sure to make and go to all appointments, and call your doctor if you are having problems. It's also a good idea to know your test results and keep a list of the medicines you take. How can you care for yourself at home? · Take your antibiotics as directed. Do not stop taking them just because you feel better. You need to take the full course of antibiotics. · Drink extra water and other fluids for the next day or two. This may help wash out the bacteria that are causing the infection. (If you have kidney, heart, or liver disease and have to limit fluids, talk with your doctor before you increase your fluid intake.)  · Avoid drinks that are carbonated or have caffeine. They can irritate the bladder. · Urinate often. Try to empty your bladder each time. · To relieve pain, take a hot bath or lay a heating pad set on low over your lower belly or genital area. Never go to sleep with a heating pad in place. To prevent UTIs  · Drink plenty of water each day. This helps you urinate often, which clears bacteria from your system. (If you have kidney, heart, or liver disease and have to limit fluids, talk with your doctor before you increase your fluid intake.)  · Urinate when you need to. · Urinate right after you have sex. · Change sanitary pads often. · Avoid douches, bubble baths, feminine hygiene sprays, and other feminine hygiene products that have deodorants.   · After going to the bathroom, wipe from front to back.  When should you call for help? Call your doctor now or seek immediate medical care if:  · Symptoms such as fever, chills, nausea, or vomiting get worse or appear for the first time. · You have new pain in your back just below your rib cage. This is called flank pain. · There is new blood or pus in your urine. · You have any problems with your antibiotic medicine. Watch closely for changes in your health, and be sure to contact your doctor if:  · You are not getting better after taking an antibiotic for 2 days. · Your symptoms go away but then come back. Where can you learn more? Go to http://birdie-juany.info/. Enter J479 in the search box to learn more about \"Urinary Tract Infection in Women: Care Instructions. \"  Current as of: November 28, 2016  Content Version: 11.2  © 9289-7965 [employer], [employer]. Care instructions adapted under license by [employer] (which disclaims liability or warranty for this information). If you have questions about a medical condition or this instruction, always ask your healthcare professional. [address] any warranty or liability for your use of this information.

## 2017-04-28 LAB
BACTERIA SPEC CULT: ABNORMAL
SERVICE CMNT-IMP: ABNORMAL

## 2017-04-30 NOTE — PROGRESS NOTES
HISTORY OF PRESENT ILLNESS  Vladimir Galeana is a 61 y.o. female. HPI  61year old established female patient in today for an ongoing concern. Patient complains of 1 week of bladder pressure. She says she took azo OTC. She says this helped with the bladder pain but she still has frequency. She denies f/s  She reports 1 day of dizziness, chills and overall feeling ill  She does have a hx of CKD and malignant neoplasm of kidney s/p resection      Allergies   Allergen Reactions    Codeine Not Reported This Time, Other (comments) and Swelling     Throat Swelling    Penicillins Angioedema and Other (comments)     N/V, swelling    Sulfa (Sulfonamide Antibiotics) Other (comments)     itching    Sulfur Hives       Past Medical History:   Diagnosis Date    Cancer ([facility] [address] 2000    Right breast ca     Congestive heart failure, unspecified     Gout     H/O total mastectomy of right breast     Heart disease     Hx: UTI (urinary tract infection)     Hypercholesterolemia     Renal cell carcinoma of left kidney ([facility] [address] 12/17/15    Renal mass, left        Family History   Problem Relation Age of Onset    Hypertension Father     Heart Attack Father     Heart Failure Father     Diabetes Father        Social History   Substance Use Topics    Smoking status: Never Smoker    Smokeless tobacco: Never Used    Alcohol use No        Current Outpatient Prescriptions   Medication Sig    ondansetron (ZOFRAN ODT) 8 mg disintegrating tablet 8 mg.  levoFLOXacin (LEVAQUIN) 750 mg tablet Take 1 Tab by mouth daily for 5 days.  furosemide (LASIX) 40 mg tablet TAKE 1 TABLET BY MOUTH ONCE A DAY AS NEEDED    lisinopril (PRINIVIL, ZESTRIL) 5 mg tablet Take 5 mg by mouth two (2) times a day.  ascorbic acid (VITAMIN C) 500 mg tablet 500 mg.  cyanocobalamin ER 1,000 mcg tablet Take 1 Tab by Mouth Once a Day.  cholecalciferol (VITAMIN D3) 1,000 unit tablet Take  by mouth daily.     multivitamin (ONE A DAY) tablet Take 1 Tab by mouth daily.  biotin 2,500 mcg tab Take  by mouth daily.  atorvastatin (LIPITOR) 20 mg tablet Take 20 mg by mouth daily.  COREG 25 mg tablet two (2) times a day.  calcium 500 mg tab Take 500 mg by mouth daily.  oxyCODONE IR (ROXICODONE) 5 mg immediate release tablet 4/18/17     No current facility-administered medications for this visit. Past Surgical History:   Procedure Laterality Date    HX CHOLECYSTECTOMY      HX MASTECTOMY Right     HX NEPHRECTOMY Right 3/22/16    Partial x2, Dr. Aminah Mcnally, [facility]    HX ORTHOPAEDIC  04/19/2017    Trigger finger release R hand    HX RENAL BIOPSY Right 12/17/15    Ct guided bx, Dr. Dana Parks, [facility]       ROS  See HPI    Visit Vitals    /89    Pulse 80    Temp 97.3 °F (36.3 °C) (Oral)    Resp 16    Ht 5' 6\" (1.676 m)    Wt 168 lb (76.2 kg)    SpO2 98%    BMI 27.12 kg/m2     Physical Exam   Constitutional: She appears well-developed and well-nourished. Abdominal: There is no tenderness. There is no CVA tenderness. ASSESSMENT and PLAN    ICD-10-CM ICD-9-CM    1. Urinary tract infection with hematuria, site unspecified N39.0 599.0 levoFLOXacin (LEVAQUIN) 750 mg tablet    R31.9  CULTURE, URINE   2. Frequency of urination R35.0 788.41 AMB POC URINALYSIS DIP STICK AUTO W/O MICRO     -Advised symptomatic care  -RTC prn    Additional Instructions: The patient understands that they should contact the office at any time if any questions or concerns develop. They are also aware that they can call our main office number at [phone] at any time if they would like to address any concerns with the physician. They also understand that they should dial 911 if any acute emergency arises. The patient understands that they should give us a minimum of 48 hours to complete prescription refills once they are requested.   The patient has also been instructed to contact us by calling the main office number if they have not received feedback within 2 weeks of having any tests completed. The patient is a aware that they should read all package insert information when picking up the medications and that they should consult the pharmacist of a physician if they have any questions or concerns regarding the prescribed medications. Discussed with the patient new medications given and patient instructed to read pharmacy literature regarding side effects and drug interactions. Instructions for taking the medications were provided to the patient and the consequences of not taking it. Follow-up Disposition:   Return if symptoms worsen or fail to improve. Risk and benefits of new medication discussed in detail when indicated, patient was given the opportunity to ask questions   AVS provided  reviewed diet, exercise and weight control when indicated  Alarm signals discussed. ER precautions reviewed when indicated  Plan of care reviewed with patient. Understanding verbalized and they are in agreement with plan of care.      Deedee Cao, DO

## 2017-05-03 NOTE — PROGRESS NOTES
I called patient in regards to Lab results. Informed pt that culture shows a UTI and the antibiotic she clear it up. Pt sts she would like more called in due to reoccurring UTI. Dr Cuate Goel please advise.   Thank you

## 2017-05-03 NOTE — PROGRESS NOTES
I called Pt and informed her about repeat UA concerns. Informed Pt that once she is finished with the Antibiotic she will have to come in for a visit due to her Hx. Informed Pt that sometimes after the antibiotic you get a yeast infection and it may feel like a UTI. Pt verbalized understanding and sts she has finished all of her Antibiotic and feels better.

## 2017-05-12 ENCOUNTER — TELEPHONE (OUTPATIENT)
Dept: INTERNAL MEDICINE CLINIC | Age: 57
End: 2017-05-12

## 2017-05-12 RX ORDER — PREDNISONE 20 MG/1
TABLET ORAL
Qty: 21 TAB | Refills: 0 | Status: SHIPPED | OUTPATIENT
Start: 2017-05-12 | End: 2017-06-07 | Stop reason: ALTCHOICE

## 2017-05-12 NOTE — TELEPHONE ENCOUNTER
Patient called in and stated that she has gout in her left foot and that she is in a lot of pain. Patient want to know if predniSONE (DELTASONE) 20 mg tablet can be called in for her. Patient would like this today. Please call patient when medication is sent to the pharmacy. Please advise.

## 2017-05-18 ENCOUNTER — TELEPHONE (OUTPATIENT)
Dept: INTERNAL MEDICINE CLINIC | Age: 57
End: 2017-05-18

## 2017-05-18 NOTE — TELEPHONE ENCOUNTER
Called and spoke with danika, informed her that a release is required for the requested result. She verbalized understanding and stated that she will have that sent over.

## 2017-05-18 NOTE — TELEPHONE ENCOUNTER
Yenni with [facility] request results from cystology report (thryoid nodule)    Ph [phone]   Fax [phone]    She was advised if we didn't refer pt to them or order the test we would not be able to send it

## 2017-06-07 ENCOUNTER — OFFICE VISIT (OUTPATIENT)
Dept: INTERNAL MEDICINE CLINIC | Age: 57
End: 2017-06-07

## 2017-06-07 ENCOUNTER — HOSPITAL ENCOUNTER (OUTPATIENT)
Dept: LAB | Age: 57
Discharge: HOME | End: 2017-06-07
Payer: COMMERCIAL

## 2017-06-07 VITALS
DIASTOLIC BLOOD PRESSURE: 71 MMHG | SYSTOLIC BLOOD PRESSURE: 120 MMHG | RESPIRATION RATE: 16 BRPM | WEIGHT: 168 LBS | BODY MASS INDEX: 27 KG/M2 | HEIGHT: 66 IN | TEMPERATURE: 97.9 F | OXYGEN SATURATION: 97 % | HEART RATE: 78 BPM

## 2017-06-07 DIAGNOSIS — R31.9 URINARY TRACT INFECTION WITH HEMATURIA, SITE UNSPECIFIED: ICD-10-CM

## 2017-06-07 DIAGNOSIS — L98.9 LESION OF SKIN OF FACE: ICD-10-CM

## 2017-06-07 DIAGNOSIS — N39.0 URINARY TRACT INFECTION WITH HEMATURIA, SITE UNSPECIFIED: ICD-10-CM

## 2017-06-07 DIAGNOSIS — N39.0 URINARY TRACT INFECTION WITH HEMATURIA, SITE UNSPECIFIED: Primary | ICD-10-CM

## 2017-06-07 DIAGNOSIS — R35.0 URINARY FREQUENCY: ICD-10-CM

## 2017-06-07 DIAGNOSIS — R31.9 URINARY TRACT INFECTION WITH HEMATURIA, SITE UNSPECIFIED: Primary | ICD-10-CM

## 2017-06-07 LAB
BILIRUB UR QL STRIP: NORMAL
GLUCOSE UR-MCNC: NEGATIVE MG/DL
KETONES P FAST UR STRIP-MCNC: NEGATIVE MG/DL
PH UR STRIP: 5.5 [PH] (ref 4.6–8)
PROT UR QL STRIP: NORMAL MG/DL
SP GR UR STRIP: 1.01 (ref 1–1.03)
UA UROBILINOGEN AMB POC: NORMAL (ref 0.2–1)
URINALYSIS CLARITY POC: NORMAL
URINALYSIS COLOR POC: NORMAL
URINE BLOOD POC: NORMAL
URINE LEUKOCYTES POC: NORMAL
URINE NITRITES POC: POSITIVE

## 2017-06-07 PROCEDURE — 87186 SC STD MICRODIL/AGAR DIL: CPT | Performed by: FAMILY MEDICINE

## 2017-06-07 PROCEDURE — 87077 CULTURE AEROBIC IDENTIFY: CPT | Performed by: FAMILY MEDICINE

## 2017-06-07 PROCEDURE — 87086 URINE CULTURE/COLONY COUNT: CPT | Performed by: FAMILY MEDICINE

## 2017-06-07 RX ORDER — MUPIROCIN 20 MG/G
OINTMENT TOPICAL DAILY
Qty: 22 G | Refills: 0 | Status: SHIPPED | OUTPATIENT
Start: 2017-06-07 | End: 2017-08-04 | Stop reason: ALTCHOICE

## 2017-06-07 RX ORDER — LEVOFLOXACIN 250 MG/1
250 TABLET ORAL DAILY
Qty: 10 TAB | Refills: 0 | Status: SHIPPED | OUTPATIENT
Start: 2017-06-07 | End: 2017-06-17

## 2017-06-07 NOTE — PROGRESS NOTES
Pt is here for possible UTI, frequency, urgency, back pain, pressure when urination x 2 days. Skin irritation on face x 4 weeks       1. Have you been to the ER, urgent care clinic since your last visit? Hospitalized since your last visit? No    2. Have you seen or consulted any other health care providers outside of the [address] [place] [address] since your last visit? Include any pap smears or colon screening.  No

## 2017-06-07 NOTE — PATIENT INSTRUCTIONS
Urinary Tract Infection in Women: Care Instructions  Your Care Instructions    A urinary tract infection, or UTI, is a general term for an infection anywhere between the kidneys and the urethra (where urine comes out). Most UTIs are bladder infections. They often cause pain or burning when you urinate. UTIs are caused by bacteria and can be cured with antibiotics. Be sure to complete your treatment so that the infection goes away. Follow-up care is a key part of your treatment and safety. Be sure to make and go to all appointments, and call your doctor if you are having problems. It's also a good idea to know your test results and keep a list of the medicines you take. How can you care for yourself at home? · Take your antibiotics as directed. Do not stop taking them just because you feel better. You need to take the full course of antibiotics. · Drink extra water and other fluids for the next day or two. This may help wash out the bacteria that are causing the infection. (If you have kidney, heart, or liver disease and have to limit fluids, talk with your doctor before you increase your fluid intake.)  · Avoid drinks that are carbonated or have caffeine. They can irritate the bladder. · Urinate often. Try to empty your bladder each time. · To relieve pain, take a hot bath or lay a heating pad set on low over your lower belly or genital area. Never go to sleep with a heating pad in place. To prevent UTIs  · Drink plenty of water each day. This helps you urinate often, which clears bacteria from your system. (If you have kidney, heart, or liver disease and have to limit fluids, talk with your doctor before you increase your fluid intake.)  · Urinate when you need to. · Urinate right after you have sex. · Change sanitary pads often. · Avoid douches, bubble baths, feminine hygiene sprays, and other feminine hygiene products that have deodorants.   · After going to the bathroom, wipe from front to back.  When should you call for help? Call your doctor now or seek immediate medical care if:  · Symptoms such as fever, chills, nausea, or vomiting get worse or appear for the first time. · You have new pain in your back just below your rib cage. This is called flank pain. · There is new blood or pus in your urine. · You have any problems with your antibiotic medicine. Watch closely for changes in your health, and be sure to contact your doctor if:  · You are not getting better after taking an antibiotic for 2 days. · Your symptoms go away but then come back. Where can you learn more? Go to http://birdie-juany.info/. Enter P980 in the search box to learn more about \"Urinary Tract Infection in Women: Care Instructions. \"  Current as of: November 28, 2016  Content Version: 11.2  © 6227-2552 [employer], [employer]. Care instructions adapted under license by [employer] (which disclaims liability or warranty for this information). If you have questions about a medical condition or this instruction, always ask your healthcare professional. [address] any warranty or liability for your use of this information.

## 2017-06-07 NOTE — MR AVS SNAPSHOT
Visit Information Date & Time Provider Department Dept. Phone Encounter #  
 6/7/2017  9:30 AM Caitlin Zambrano DO [id number] Follow-up Instructions Return if symptoms worsen or fail to improve. Your Appointments 6/7/2017  9:30 AM  
Office Visit with Caitlin Zambrano DO [facility] (--) Appt Note: SKIN IRRITATION; pt r/s 06/06/17 rb 06/06/17; $15 CP rb 06/06/17; lmtrc and mailed letter; pt r/s from 06/07/2017 (per pt did not no show, was r/s to today [address],[facility] [address] [address]  
[address] [place] [address] [phone]  
  
    
 6/28/2017  8:10 AM  
LAB with Caitlin Zambrano DO [facility] (--) Appt Note: 3 mo f/u lab [address] [address]  
[phone]  
  
   
 [address] [place] [address] [address] [phone]  
  
    
 7/5/2017  8:15 AM  
ROUTINE CARE with Caitlin Zambrano DO [facility] (--) Appt Note: 3 mo f/u  
 [address] [address]  
[phone]  
  
   
 [address] [place] [address] [address] [phone]  
  
    
 7/12/2017  9:15 AM  
ESTABLISHED PATIENT with Rusty Grigsby MD  
[facility] ([address]) Appt Note: 6mo F/U, Rev Imaging- to be done at [place] [address]- to be done at PCP or Neph.  
 [address] [facility] 
[address] [facility] [address]  
[address] [facility] [address] [facility] [address] Upcoming Health Maintenance Date Due  
 FOOT EXAM Q1 4/22/1969 Pneumococcal 19-64 Highest Risk (1 of 3 - PCV13) 4/22/1978 DTaP/Tdap/Td series (1 - Tdap) 4/22/1980 PAP AKA CERVICAL CYTOLOGY 4/22/1980 FOBT Q 1 YEAR AGE 50-75 4/22/2009 INFLUENZA AGE 9 TO ADULT 8/1/2017 HEMOGLOBIN A1C Q6M 8/21/2017 EYE EXAM RETINAL OR DILATED Q1 12/19/2017 MICROALBUMIN Q1 2/21/2018 LIPID PANEL Q1 2/21/2018 BREAST CANCER SCRN MAMMOGRAM 11/10/2018 Allergies as of 6/7/2017  Review Complete On: 6/7/2017 By: Pino Polk LPN Severity Noted Reaction Type Reactions Codeine  10/19/2015    Not Reported This Time, Other (comments), Swelling Throat Swelling Penicillins  07/31/2015    Angioedema, Other (comments) N/V, swelling Sulfa (Sulfonamide Antibiotics)  04/20/2016    Other (comments)  
 itching Sulfur  07/31/2015    Hives Current Immunizations  Never Reviewed Name Date Influenza Vaccine (Quad) PF 11/17/2016 Not reviewed this visit You Were Diagnosed With   
  
 Codes Comments Urinary tract infection with hematuria, site unspecified    -  Primary ICD-10-CM: N39.0, R31.9 ICD-9-CM: 599.0 Urinary frequency     ICD-10-CM: R35.0 ICD-9-CM: 788.41 Vitals BP Pulse Temp Resp Height(growth percentile) Weight(growth percentile) 120/71 78 97.9 °F (36.6 °C) (Oral) 16 5' 6\" (1.676 m) 168 lb (76.2 kg) SpO2 BMI OB Status Smoking Status 97% 27.12 kg/m2 Postmenopausal Never Smoker Vitals History BMI and BSA Data Body Mass Index Body Surface Area  
 27.12 kg/m 2 1.88 m 2 Preferred Pharmacy Pharmacy Name Phone [address] [place] [address], [address] [place] [address] [phone] Your Updated Medication List  
  
   
This list is accurate as of: 6/7/17  9:18 AM.  Always use your most recent med list.  
  
  
  
  
 ascorbic acid (vitamin C) 500 mg tablet Commonly known as:  VITAMIN C  
500 mg.  
  
 atorvastatin 20 mg tablet Commonly known as:  LIPITOR Take 20 mg by mouth daily. biotin 2,500 mcg Tab Take  by mouth daily. calcium 500 mg Tab Take 500 mg by mouth daily. COREG 25 mg tablet Generic drug:  carvedilol  
two (2) times a day. cyanocobalamin ER 1,000 mcg tablet Take 1 Tab by Mouth Once a Day. furosemide 40 mg tablet Commonly known as:  LASIX TAKE 1 TABLET BY MOUTH ONCE A DAY AS NEEDED  
  
 levoFLOXacin 250 mg tablet Commonly known as:  Pita Sotomayor Take 1 Tab by mouth daily for 10 days. lisinopril 5 mg tablet Commonly known as:  Abiola Gaster Take 5 mg by mouth two (2) times a day. multivitamin tablet Commonly known as:  ONE A DAY Take 1 Tab by mouth daily. ondansetron 8 mg disintegrating tablet Commonly known as:  ZOFRAN ODT  
8 mg. VITAMIN D3 1,000 unit tablet Generic drug:  cholecalciferol Take  by mouth daily. Prescriptions Sent to Pharmacy Refills  
 levoFLOXacin (LEVAQUIN) 250 mg tablet 0 Sig: Take 1 Tab by mouth daily for 10 days. Class: Normal  
 Pharmacy: [facility], [address] [place] [address] #: [phone] Route: Oral  
  
We Performed the Following AMB POC URINALYSIS DIP STICK AUTO W/O MICRO [11943 CPT(R)] Follow-up Instructions Return if symptoms worsen or fail to improve. To-Do List   
 06/07/2017 Microbiology:  CULTURE, URINE Patient Instructions Urinary Tract Infection in Women: Care Instructions Your Care Instructions A urinary tract infection, or UTI, is a general term for an infection anywhere between the kidneys and the urethra (where urine comes out). Most UTIs are bladder infections. They often cause pain or burning when you urinate. UTIs are caused by bacteria and can be cured with antibiotics. Be sure to complete your treatment so that the infection goes away. Follow-up care is a key part of your treatment and safety. Be sure to make and go to all appointments, and call your doctor if you are having problems. It's also a good idea to know your test results and keep a list of the medicines you take. How can you care for yourself at home? · Take your antibiotics as directed.  Do not stop taking them just because you feel better. You need to take the full course of antibiotics. · Drink extra water and other fluids for the next day or two. This may help wash out the bacteria that are causing the infection. (If you have kidney, heart, or liver disease and have to limit fluids, talk with your doctor before you increase your fluid intake.) · Avoid drinks that are carbonated or have caffeine. They can irritate the bladder. · Urinate often. Try to empty your bladder each time. · To relieve pain, take a hot bath or lay a heating pad set on low over your lower belly or genital area. Never go to sleep with a heating pad in place. To prevent UTIs · Drink plenty of water each day. This helps you urinate often, which clears bacteria from your system. (If you have kidney, heart, or liver disease and have to limit fluids, talk with your doctor before you increase your fluid intake.) · Urinate when you need to. · Urinate right after you have sex. · Change sanitary pads often. · Avoid douches, bubble baths, feminine hygiene sprays, and other feminine hygiene products that have deodorants. · After going to the bathroom, wipe from front to back. When should you call for help? Call your doctor now or seek immediate medical care if: · Symptoms such as fever, chills, nausea, or vomiting get worse or appear for the first time. · You have new pain in your back just below your rib cage. This is called flank pain. · There is new blood or pus in your urine. · You have any problems with your antibiotic medicine. Watch closely for changes in your health, and be sure to contact your doctor if: 
· You are not getting better after taking an antibiotic for 2 days. · Your symptoms go away but then come back. Where can you learn more? Go to http://birdie-juany.info/. Enter K235 in the search box to learn more about \"Urinary Tract Infection in Women: Care Instructions. \" Current as of: November 28, 2016 Content Version: 11.2 © 2551-4126 [employer], [employer]. Care instructions adapted under license by [employer] (which disclaims liability or warranty for this information). If you have questions about a medical condition or this instruction, always ask your healthcare professional. [address] any warranty or liability for your use of this information. Introducing [facility]! Dear Dima Palma: Thank you for requesting a [employer] account. Our records indicate that you already have an active [employer] account. You can access your account anytime at [email or web address] Did you know that you can access your hospital and ER discharge instructions at any time in [employer]? You can also review all of your test results from your hospital stay or ER visit. Additional Information If you have questions, please visit the Frequently Asked Questions section of the [employer] website at [email or web address] Remember, [employer] is NOT to be used for urgent needs. For medical emergencies, dial 911. Now available from your iPhone and Android! Please provide this summary of care documentation to your next provider. Your primary care clinician is listed as Nhung Soriano. If you have any questions after today's visit, please call [phone].

## 2017-06-07 NOTE — PROGRESS NOTES
HISTORY OF PRESENT ILLNESS  Deric Gutierrez is a 61 y.o. female. HPI  61year old established female patient in today for an ongoing concern. Patient complains of 2 days of dysuria, kidney pain (crampy) and urgency. She says she took azo OTC yesterday. She says this helped with the bladder pain but she still has frequency. She denies f/s but says she had chills yesterday. She reports 1 day of leg swelling for which she took lasix    She does have a hx of CKD and malignant neoplasm of kidney s/p resection. This her second UTI in 2 months    She also reports lesion on left chin, started as vesicles, now sore and red but over all improved. Allergies   Allergen Reactions    Codeine Not Reported This Time, Other (comments) and Swelling     Throat Swelling    Penicillins Angioedema and Other (comments)     N/V, swelling    Sulfa (Sulfonamide Antibiotics) Other (comments)     itching    Sulfur Hives       Past Medical History:   Diagnosis Date    Cancer [facility]) 2000    Right breast ca     Congestive heart failure, unspecified     Gout     H/O total mastectomy of right breast     Heart disease     Hx: UTI (urinary tract infection)     Hypercholesterolemia     Renal cell carcinoma of left kidney ([facility] [address] 12/17/15    Renal mass, left        Family History   Problem Relation Age of Onset    Hypertension Father     Heart Attack Father     Heart Failure Father     Diabetes Father        Social History   Substance Use Topics    Smoking status: Never Smoker    Smokeless tobacco: Never Used    Alcohol use No        Current Outpatient Prescriptions   Medication Sig    levoFLOXacin (LEVAQUIN) 250 mg tablet Take 1 Tab by mouth daily for 10 days.  mupirocin (BACTROBAN) 2 % ointment Apply  to affected area daily.  lisinopril (PRINIVIL, ZESTRIL) 5 mg tablet Take 5 mg by mouth two (2) times a day.  ascorbic acid (VITAMIN C) 500 mg tablet 500 mg.     cyanocobalamin ER 1,000 mcg tablet Take 1 Tab by Mouth Once a Day.  cholecalciferol (VITAMIN D3) 1,000 unit tablet Take  by mouth daily.  multivitamin (ONE A DAY) tablet Take 1 Tab by mouth daily.  biotin 2,500 mcg tab Take  by mouth daily.  atorvastatin (LIPITOR) 20 mg tablet Take 20 mg by mouth daily.  COREG 25 mg tablet two (2) times a day.  calcium 500 mg tab Take 500 mg by mouth daily.  ondansetron (ZOFRAN ODT) 8 mg disintegrating tablet 8 mg.  furosemide (LASIX) 40 mg tablet TAKE 1 TABLET BY MOUTH ONCE A DAY AS NEEDED     No current facility-administered medications for this visit. Past Surgical History:   Procedure Laterality Date    HX CHOLECYSTECTOMY      HX MASTECTOMY Right     HX NEPHRECTOMY Right 3/22/16    Partial x2, Dr. Heber Hutchison, [facility]    HX ORTHOPAEDIC  04/19/2017    Trigger finger release R hand    HX RENAL BIOPSY Right 12/17/15    Ct guided bx, Dr. Adelfo Alberts, [facility]       ROS  See HPI    Visit Vitals    /71    Pulse 78    Temp 97.9 °F (36.6 °C) (Oral)    Resp 16    Ht 5' 6\" (1.676 m)    Wt 168 lb (76.2 kg)    SpO2 97%    BMI 27.12 kg/m2     Physical Exam   Constitutional: She appears well-developed and well-nourished. Abdominal: There is no tenderness. There is no CVA tenderness. Skin:   0.5 cm chin lesion with flat scab, erythematous base, no discharge       ASSESSMENT and PLAN    ICD-10-CM ICD-9-CM    1. Urinary tract infection with hematuria, site unspecified N39.0 599.0 CULTURE, URINE    R31.9  levoFLOXacin (LEVAQUIN) 250 mg tablet   2. Urinary frequency R35.0 788.41 AMB POC URINALYSIS DIP STICK AUTO W/O MICRO   3. Lesion of skin of face L98.9 709.9 mupirocin (BACTROBAN) 2 % ointment     -Advised symptomatic care  -If UTI recurs advised patient that she will need to follow up with specialist given hx  -RTC prn    Additional Instructions: The patient understands that they should contact the office at any time if any questions or concerns develop.   They are also aware that they can call our main office number at [phone] at any time if they would like to address any concerns with the physician. They also understand that they should dial 911 if any acute emergency arises. The patient understands that they should give us a minimum of 48 hours to complete prescription refills once they are requested. The patient has also been instructed to contact us by calling the main office number if they have not received feedback within 2 weeks of having any tests completed. The patient is a aware that they should read all package insert information when picking up the medications and that they should consult the pharmacist of a physician if they have any questions or concerns regarding the prescribed medications. Discussed with the patient new medications given and patient instructed to read pharmacy literature regarding side effects and drug interactions. Instructions for taking the medications were provided to the patient and the consequences of not taking it. Follow-up Disposition:   Return if symptoms worsen or fail to improve. Risk and benefits of new medication discussed in detail when indicated, patient was given the opportunity to ask questions   AVS provided  reviewed diet, exercise and weight control when indicated  Alarm signals discussed. ER precautions reviewed when indicated  Plan of care reviewed with patient. Understanding verbalized and they are in agreement with plan of care.      Ginger Prescott, DO

## 2017-06-09 LAB
BACTERIA SPEC CULT: ABNORMAL
SERVICE CMNT-IMP: ABNORMAL

## 2017-06-15 LAB — MICROALBUMIN UR TEST STR-MCNC: 30.3 MG/DL

## 2017-06-23 DIAGNOSIS — E11.21 TYPE 2 DIABETES MELLITUS WITH DIABETIC NEPHROPATHY, WITHOUT LONG-TERM CURRENT USE OF INSULIN (HCC): ICD-10-CM

## 2017-06-23 DIAGNOSIS — E05.20 TOXIC MULTINODULAR GOITER: ICD-10-CM

## 2017-06-23 DIAGNOSIS — R60.9 PERIPHERAL EDEMA: ICD-10-CM

## 2017-06-23 DIAGNOSIS — N18.4 CHRONIC KIDNEY DISEASE (CKD) STAGE G4/A1, SEVERELY DECREASED GLOMERULAR FILTRATION RATE (GFR) BETWEEN 15-29 ML/MIN/1.73 SQUARE METER AND ALBUMINURIA CREATININE RATIO LESS THAN 30 MG/G (HCC): Primary | ICD-10-CM

## 2017-06-23 DIAGNOSIS — G47.33 OBSTRUCTIVE SLEEP APNEA SYNDROME, MODERATE: ICD-10-CM

## 2017-07-10 ENCOUNTER — HOSPITAL ENCOUNTER (OUTPATIENT)
Dept: ULTRASOUND IMAGING | Age: 57
Discharge: HOME | End: 2017-07-10
Attending: UROLOGY
Payer: COMMERCIAL

## 2017-07-10 DIAGNOSIS — C64.1 RENAL CELL CARCINOMA, RIGHT (HCC): ICD-10-CM

## 2017-07-10 PROCEDURE — 76770 US EXAM ABDO BACK WALL COMP: CPT

## 2017-07-31 ENCOUNTER — HOSPITAL ENCOUNTER (OUTPATIENT)
Dept: LAB | Age: 57
Discharge: HOME | End: 2017-07-31
Payer: COMMERCIAL

## 2017-07-31 ENCOUNTER — HOSPITAL ENCOUNTER (OUTPATIENT)
Dept: GENERAL RADIOLOGY | Age: 57
Discharge: HOME | End: 2017-07-31
Payer: COMMERCIAL

## 2017-07-31 DIAGNOSIS — Z01.818 PRE-OP TESTING: ICD-10-CM

## 2017-07-31 LAB
ANION GAP BLD CALC-SCNC: 9 MMOL/L (ref 3–18)
BASOPHILS # BLD AUTO: 0.1 K/UL (ref 0–0.1)
BASOPHILS # BLD: 1 % (ref 0–2)
BUN SERPL-MCNC: 32 MG/DL (ref 7–18)
BUN/CREAT SERPL: 17 (ref 12–20)
CALCIUM SERPL-MCNC: 8.7 MG/DL (ref 8.5–10.1)
CHLORIDE SERPL-SCNC: 109 MMOL/L (ref 100–108)
CO2 SERPL-SCNC: 24 MMOL/L (ref 21–32)
CREAT SERPL-MCNC: 1.9 MG/DL (ref 0.6–1.3)
CREATININE, EXTERNAL: 1.9
DIFFERENTIAL METHOD BLD: NORMAL
EOSINOPHIL # BLD: 0.1 K/UL (ref 0–0.4)
EOSINOPHIL NFR BLD: 2 % (ref 0–5)
ERYTHROCYTE [DISTWIDTH] IN BLOOD BY AUTOMATED COUNT: 13.9 % (ref 11.6–14.5)
GLUCOSE SERPL-MCNC: 150 MG/DL (ref 74–99)
HCT VFR BLD AUTO: 38.7 % (ref 35–45)
HGB BLD-MCNC: 12.9 G/DL (ref 12–16)
INR PPP: 1 (ref 0.8–1.2)
LYMPHOCYTES # BLD AUTO: 27 % (ref 21–52)
LYMPHOCYTES # BLD: 1.6 K/UL (ref 0.9–3.6)
MCH RBC QN AUTO: 29.7 PG (ref 24–34)
MCHC RBC AUTO-ENTMCNC: 33.3 G/DL (ref 31–37)
MCV RBC AUTO: 89 FL (ref 74–97)
MONOCYTES # BLD: 0.4 K/UL (ref 0.05–1.2)
MONOCYTES NFR BLD AUTO: 7 % (ref 3–10)
NEUTS SEG # BLD: 3.8 K/UL (ref 1.8–8)
NEUTS SEG NFR BLD AUTO: 63 % (ref 40–73)
PLATELET # BLD AUTO: 234 K/UL (ref 135–420)
PMV BLD AUTO: 9.7 FL (ref 9.2–11.8)
POTASSIUM SERPL-SCNC: 4.4 MMOL/L (ref 3.5–5.5)
PROTHROMBIN TIME: 12.5 SEC (ref 11.5–15.2)
RBC # BLD AUTO: 4.35 M/UL (ref 4.2–5.3)
SODIUM SERPL-SCNC: 142 MMOL/L (ref 136–145)
WBC # BLD AUTO: 6 K/UL (ref 4.6–13.2)

## 2017-07-31 PROCEDURE — 85025 COMPLETE CBC W/AUTO DIFF WBC: CPT | Performed by: UROLOGY

## 2017-07-31 PROCEDURE — 71020 XR CHEST PA LAT: CPT

## 2017-07-31 PROCEDURE — 36415 COLL VENOUS BLD VENIPUNCTURE: CPT | Performed by: UROLOGY

## 2017-07-31 PROCEDURE — 87086 URINE CULTURE/COLONY COUNT: CPT | Performed by: UROLOGY

## 2017-07-31 PROCEDURE — 93005 ELECTROCARDIOGRAM TRACING: CPT

## 2017-07-31 PROCEDURE — 80048 BASIC METABOLIC PNL TOTAL CA: CPT | Performed by: UROLOGY

## 2017-07-31 PROCEDURE — 85610 PROTHROMBIN TIME: CPT | Performed by: UROLOGY

## 2017-08-01 LAB
ATRIAL RATE: 76 BPM
CALCULATED P AXIS, ECG09: 51 DEGREES
CALCULATED R AXIS, ECG10: -46 DEGREES
CALCULATED T AXIS, ECG11: 48 DEGREES
DIAGNOSIS, 93000: NORMAL
P-R INTERVAL, ECG05: 174 MS
Q-T INTERVAL, ECG07: 410 MS
QRS DURATION, ECG06: 118 MS
QTC CALCULATION (BEZET), ECG08: 461 MS
VENTRICULAR RATE, ECG03: 76 BPM

## 2017-08-02 LAB
BACTERIA SPEC CULT: NORMAL
SERVICE CMNT-IMP: NORMAL

## 2017-08-04 ENCOUNTER — OFFICE VISIT (OUTPATIENT)
Dept: INTERNAL MEDICINE CLINIC | Age: 57
End: 2017-08-04

## 2017-08-04 ENCOUNTER — TELEPHONE (OUTPATIENT)
Dept: INTERNAL MEDICINE CLINIC | Age: 57
End: 2017-08-04

## 2017-08-04 VITALS
BODY MASS INDEX: 27.55 KG/M2 | RESPIRATION RATE: 16 BRPM | SYSTOLIC BLOOD PRESSURE: 126 MMHG | HEIGHT: 66 IN | HEART RATE: 66 BPM | WEIGHT: 171.4 LBS | DIASTOLIC BLOOD PRESSURE: 84 MMHG | TEMPERATURE: 97 F | OXYGEN SATURATION: 97 %

## 2017-08-04 DIAGNOSIS — N18.4 CHRONIC KIDNEY DISEASE (CKD) STAGE G4/A1, SEVERELY DECREASED GLOMERULAR FILTRATION RATE (GFR) BETWEEN 15-29 ML/MIN/1.73 SQUARE METER AND ALBUMINURIA CREATININE RATIO LESS THAN 30 MG/G (HCC): ICD-10-CM

## 2017-08-04 DIAGNOSIS — E11.21 TYPE 2 DIABETES MELLITUS WITH DIABETIC NEPHROPATHY, WITHOUT LONG-TERM CURRENT USE OF INSULIN (HCC): ICD-10-CM

## 2017-08-04 DIAGNOSIS — Z85.3 HX: BREAST CANCER: ICD-10-CM

## 2017-08-04 DIAGNOSIS — R60.9 PERIPHERAL EDEMA: ICD-10-CM

## 2017-08-04 DIAGNOSIS — C64.2 MALIGNANT NEOPLASM OF KIDNEY, LEFT (HCC): Primary | ICD-10-CM

## 2017-08-04 DIAGNOSIS — E05.20 TOXIC MULTINODULAR GOITER: ICD-10-CM

## 2017-08-04 NOTE — PROGRESS NOTES
HPI:    Simon Bashir  is a very pleasant 61 y.o. female  patient who comes in today for a pre-op exam as she is due to have a left open partial nephrectomy on 8/18/2017 with Dr Kaela Alcantar. She denies complaints of SOB, CP, dizziness, headache. She is not exercising as regularly as she was and has gained about 6 pounds but feels well. She takes daily medication with compliance. Patient denies CLARENCE and GERD. Hx of CKD stage 3, diabetes, h/o Cardiomyopathy,  Toxic multinodular goiter, gout and recent diagnosis of right renal cell carcinoma, clear cell type s/p resection. Current Outpatient Prescriptions   Medication Sig Dispense Refill    furosemide (LASIX) 40 mg tablet TAKE 1 TABLET BY MOUTH ONCE A DAY AS NEEDED  0    lisinopril (PRINIVIL, ZESTRIL) 5 mg tablet Take 5 mg by mouth two (2) times a day.  0    ascorbic acid (VITAMIN C) 500 mg tablet 500 mg.  cyanocobalamin ER 1,000 mcg tablet Take 1 Tab by Mouth Once a Day.  cholecalciferol (VITAMIN D3) 1,000 unit tablet Take  by mouth daily.  multivitamin (ONE A DAY) tablet Take 1 Tab by mouth daily.  atorvastatin (LIPITOR) 20 mg tablet Take 20 mg by mouth daily. 0    COREG 25 mg tablet two (2) times a day.  0    calcium 500 mg tab Take 500 mg by mouth daily.  ondansetron (ZOFRAN ODT) 8 mg disintegrating tablet 8 mg.         Allergies   Allergen Reactions    Codeine Not Reported This Time, Other (comments) and Swelling     Throat Swelling    Penicillins Angioedema and Other (comments)     N/V, swelling    Sulfa (Sulfonamide Antibiotics) Other (comments)     itching    Sulfur Hives      Past Medical History:   Diagnosis Date    Cancer [facility]) 2000    Right breast ca     Congestive heart failure, unspecified     Gout     H/O total mastectomy of right breast     Heart disease     Hx: UTI (urinary tract infection)     Hypercholesterolemia     Renal cell carcinoma of left kidney ([facility] [address] 12/17/15    Renal mass, left Past Surgical History:   Procedure Laterality Date    HX CHOLECYSTECTOMY      HX MASTECTOMY Right     HX NEPHRECTOMY Right 3/22/16    Partial x2, Dr. Sharona Starr, [facility]    HX ORTHOPAEDIC  04/19/2017    Trigger finger release R hand    HX RENAL BIOPSY Right 12/17/15    Ct guided bx, Dr. Elizabet Alcala, [facility]      Social History     Social History    Marital status:      Spouse name: N/A    Number of children: N/A    Years of education: N/A     Occupational History    Not on file. Social History Main Topics    Smoking status: Never Smoker    Smokeless tobacco: Never Used    Alcohol use No    Drug use: No    Sexual activity: Not on file     Other Topics Concern    Not on file     Social History Narrative      Family History   Problem Relation Age of Onset    Hypertension Father     Heart Attack Father     Heart Failure Father     Diabetes Father       Patient Active Problem List   Diagnosis Code    Type 2 diabetes mellitus with renal manifestations ([facility] [address] E11.29    Muscle cramps R25.2    Peripheral edema R60.9    HX: breast cancer Z85.3    Chemotherapy induced cardiomyopathy ([address] I42.7, T45.1X5A    Obstructive sleep apnea syndrome, moderate G47.33    Toxic multinodular goiter E05.20    Malignant neoplasm of kidney (HCC) C64.9    Chronic kidney disease (CKD) stage G4/A1, severely decreased glomerular filtration rate (GFR) between 15-29 mL/min/1.73 square meter and albuminuria creatinine ratio less than 30 mg/g (HCC) N18.4    Chronic kidney disease (CKD) stage G3a/A1, moderately decreased glomerular filtration rate (GFR) between 45-59 mL/min/1.73 square meter and albuminuria creatinine ratio less than 30 mg/g N18.3            Review of Systems   Constitutional: Negative for chills, diaphoresis, fever, malaise/fatigue and weight loss. HENT: Negative for tinnitus. Eyes: Negative for blurred vision and double vision.    Respiratory: Negative for cough, sputum production, shortness of breath and wheezing. Cardiovascular: Positive for leg swelling (mild swelling). Negative for chest pain, palpitations, orthopnea, claudication and PND. Gastrointestinal: Negative for abdominal pain, constipation, diarrhea, heartburn, nausea and vomiting. Genitourinary: Negative for dysuria, flank pain and frequency. Musculoskeletal: Positive for myalgias (mild with statin). Neurological: Negative for dizziness, tingling, speech change, focal weakness, weakness and headaches. Endo/Heme/Allergies: Does not bruise/bleed easily. Psychiatric/Behavioral: Negative for depression. Visit Vitals    /84    Pulse 66    Temp 97 °F (36.1 °C) (Oral)    Resp 16    Ht 5' 6\" (1.676 m)    Wt 171 lb 6.4 oz (77.7 kg)    SpO2 97%    BMI 27.66 kg/m2        Physical Exam   Constitutional: She is oriented to person, place, and time and well-developed, well-nourished, and in no distress. No distress. HENT:   Head: Normocephalic and atraumatic. Eyes: Conjunctivae and EOM are normal. Pupils are equal, round, and reactive to light. Neck: Normal range of motion. Neck supple. No JVD present. Cardiovascular: Normal rate, regular rhythm, normal heart sounds and intact distal pulses. Pulmonary/Chest: Effort normal and breath sounds normal. No respiratory distress. She has no wheezes. She has no rales. Abdominal: Soft. Bowel sounds are normal. She exhibits no distension and no mass. There is no tenderness. There is no rebound and no guarding. Musculoskeletal: Normal range of motion. She exhibits edema (mild peripheral edema). She exhibits no tenderness or deformity. Lymphadenopathy:     She has no cervical adenopathy. Neurological: She is alert and oriented to person, place, and time. Skin: She is not diaphoretic.    Psychiatric: Affect normal.     EKG 8/1/2017  Diagnosis   Final   Normal sinus rhythm   Left axis deviation   Anterior infarct (cited on or before 11-MAR-2016)   Abnormal ECG   When compared with ECG of 13-APR-2017 09:32,   No significant change was found   Confirmed by Liz Alegria MD, Denise Neat ([id number]) on 8/1/2017 4:15:24 PM      CXR 7/31/2017  IMPRESSION:     Stable mild cardiomegaly. Atherosclerosis. Assessment/Plan:    Diagnoses and all orders for this visit:    1. Malignant neoplasm of kidney, left ([address]    2. Chronic kidney disease (CKD) stage G4/A1, severely decreased glomerular filtration rate (GFR) between 15-29 mL/min/1.73 square meter and albuminuria creatinine ratio less than 30 mg/g (HCC)    3. Type 2 diabetes mellitus with diabetic nephropathy, without long-term current use of insulin ([address]    4. Toxic multinodular goiter    5. Peripheral edema    6. HX: breast cancer      -DM- A1c with good control  -CKD stage 3- stable currently  Peripheral edema- mild  Cardiomyopathy stable with EF of 50% on recent ECHO  -Advised ctn current therapeutic treatment regimen  - Will check with cardiology and advise of scheduled surgery. Cardiology has indicated they do not need to see her for pre-op. -At this time patient is considered low risk and,okay to proceed with surgery.       Roseann Meier PA-C

## 2017-08-04 NOTE — PROGRESS NOTES
Anesthesia Pre-Operative Assessment      Subjective:      Patient:  Dolly Fitzgerald   Procedure: [unfilled]    Past Medical History:   Diagnosis Date    Cancer [facility]) 2000    Right breast ca     Congestive heart failure, unspecified     Gout     H/O total mastectomy of right breast     Heart disease     Hx: UTI (urinary tract infection)     Hypercholesterolemia     Renal cell carcinoma of left kidney ([facility] [address] 12/17/15    Renal mass, left      Family History   Problem Relation Age of Onset    Hypertension Father     Heart Attack Father     Heart Failure Father     Diabetes Father      Current Outpatient Prescriptions   Medication Sig    furosemide (LASIX) 40 mg tablet TAKE 1 TABLET BY MOUTH ONCE A DAY AS NEEDED    lisinopril (PRINIVIL, ZESTRIL) 5 mg tablet Take 5 mg by mouth two (2) times a day.  ascorbic acid (VITAMIN C) 500 mg tablet 500 mg.  cyanocobalamin ER 1,000 mcg tablet Take 1 Tab by Mouth Once a Day.  cholecalciferol (VITAMIN D3) 1,000 unit tablet Take  by mouth daily.  multivitamin (ONE A DAY) tablet Take 1 Tab by mouth daily.  atorvastatin (LIPITOR) 20 mg tablet Take 20 mg by mouth daily.  COREG 25 mg tablet two (2) times a day.  calcium 500 mg tab Take 500 mg by mouth daily.  ondansetron (ZOFRAN ODT) 8 mg disintegrating tablet 8 mg.  biotin 2,500 mcg tab Take  by mouth daily. No current facility-administered medications for this visit. Allergies   Allergen Reactions    Codeine Not Reported This Time, Other (comments) and Swelling     Throat Swelling    Penicillins Angioedema and Other (comments)     N/V, swelling    Sulfa (Sulfonamide Antibiotics) Other (comments)     itching    Sulfur Hives     Social History     Social History    Marital status:      Spouse name: N/A    Number of children: N/A    Years of education: N/A     Occupational History    Not on file.      Social History Main Topics    Smoking status: Never Smoker    Smokeless tobacco: Never Used    Alcohol use No    Drug use: No    Sexual activity: Not on file     Other Topics Concern    Not on file     Social History Narrative       Review of Systems  {Review Of Systems:44941}    Objective:     Physical Exam:    @IPVITALS[8:@    @TMAX[24@    {Exam, Complete:24893895}    Prior Surgeries:          {Prior surgery:46987}  Anesthetic Problems: {Problems:19023}    Lab/Data Review:  {LABS REVIEWED:98940459}    Assessment:     Active Problems:    * No active hospital problems.  *      Plan/Recommendations/Medical Decision Making:     Anesthetic discussed and agreed upon by patient: {ANESTHETICS:93033546}      Signed By: Christina Muhammad PA-C  Date:           8/4/2017  Time:          10:55 AM

## 2017-08-04 NOTE — PATIENT INSTRUCTIONS

## 2017-08-04 NOTE — PROGRESS NOTES
Pt is here for Pre-Op Clearance-Dr Dallas Tao      1. Have you been to the ER, urgent care clinic since your last visit? Hospitalized since your last visit? No    2. Have you seen or consulted any other health care providers outside of the [address] [place] [address] since your last visit? Include any pap smears or colon screening.  Dr Loli Barnes

## 2017-08-04 NOTE — TELEPHONE ENCOUNTER
Pt sees Dr Juan Carlos Chow, cardiology @ [phone]. His office states the surgery generally contacts Dr Phi Acevedo office directly if they are requesting a Pt to be seen before surgery. Dr Grant Spencer office aware to call & inform DIA Patterson if Pt needs to be seen before her scheduled surgery on 8/18/19.

## 2017-08-04 NOTE — MR AVS SNAPSHOT
Visit Information Date & Time Provider Department Dept. Phone Encounter #  
 8/4/2017 10:00 AM Clarissa Butcher PA-C [facility] [employer] [phone] Follow-up Instructions Return if symptoms worsen or fail to improve. Your Appointments 9/6/2017  3:45 PM  
ESTABLISHED PATIENT with Jose Angel Arias MD  
[facility] ([address]) Appt Note: 3wk PostOp, Sx 8/18/2017  
 [address] 
[address] [facility] [address]  
[address] [facility] [address] [facility] [address] Upcoming Health Maintenance Date Due  
 FOOT EXAM Q1 4/22/1969 Pneumococcal 19-64 Highest Risk (1 of 3 - PCV13) 4/22/1978 DTaP/Tdap/Td series (1 - Tdap) 4/22/1980 PAP AKA CERVICAL CYTOLOGY 4/22/1980 FOBT Q 1 YEAR AGE 50-75 4/22/2009 INFLUENZA AGE 9 TO ADULT 8/1/2017 HEMOGLOBIN A1C Q6M 8/21/2017 EYE EXAM RETINAL OR DILATED Q1 12/19/2017 MICROALBUMIN Q1 2/21/2018 LIPID PANEL Q1 2/21/2018 BREAST CANCER SCRN MAMMOGRAM 11/10/2018 Allergies as of 8/4/2017  Review Complete On: 8/4/2017 By: Clarissa Butcher PA-C Severity Noted Reaction Type Reactions Codeine  10/19/2015    Not Reported This Time, Other (comments), Swelling Throat Swelling Penicillins  07/31/2015    Angioedema, Other (comments) N/V, swelling Sulfa (Sulfonamide Antibiotics)  04/20/2016    Other (comments)  
 itching Sulfur  07/31/2015    Hives Current Immunizations  Never Reviewed Name Date Influenza Vaccine (Quad) PF 11/17/2016 Not reviewed this visit You Were Diagnosed With   
  
 Codes Comments Malignant neoplasm of kidney, left (HCC)    -  Primary ICD-10-CM: C64.2 ICD-9-CM: 189. 0 Chronic kidney disease (CKD) stage G4/A1, severely decreased glomerular filtration rate (GFR) between 15-29 mL/min/1.73 square meter and albuminuria creatinine ratio less than 30 mg/g (HCC)     ICD-10-CM: N18.4 ICD-9-CM: 710. 4 Elevated hemoglobin A1c measurement     ICD-10-CM: R73.09 
ICD-9-CM: 790.29 Vitals BP Pulse Temp Resp Height(growth percentile) Weight(growth percentile) 126/84 66 97 °F (36.1 °C) (Oral) 16 5' 6\" (1.676 m) 171 lb 6.4 oz (77.7 kg) SpO2 BMI OB Status Smoking Status 97% 27.66 kg/m2 Postmenopausal Never Smoker Vitals History BMI and BSA Data Body Mass Index Body Surface Area  
 27.66 kg/m 2 1.9 m 2 Preferred Pharmacy Pharmacy Name Phone [address], [address] [place] [address] [phone] Your Updated Medication List  
  
   
This list is accurate as of: 8/4/17 11:14 AM.  Always use your most recent med list.  
  
  
  
  
 ascorbic acid (vitamin C) 500 mg tablet Commonly known as:  VITAMIN C  
500 mg.  
  
 atorvastatin 20 mg tablet Commonly known as:  LIPITOR Take 20 mg by mouth daily. calcium 500 mg Tab Take 500 mg by mouth daily. COREG 25 mg tablet Generic drug:  carvedilol  
two (2) times a day. cyanocobalamin ER 1,000 mcg tablet Take 1 Tab by Mouth Once a Day. furosemide 40 mg tablet Commonly known as:  LASIX TAKE 1 TABLET BY MOUTH ONCE A DAY AS NEEDED  
  
 lisinopril 5 mg tablet Commonly known as:  Mike Littler Take 5 mg by mouth two (2) times a day. multivitamin tablet Commonly known as:  ONE A DAY Take 1 Tab by mouth daily. ondansetron 8 mg disintegrating tablet Commonly known as:  ZOFRAN ODT  
8 mg. VITAMIN D3 1,000 unit tablet Generic drug:  cholecalciferol Take  by mouth daily. Follow-up Instructions Return if symptoms worsen or fail to improve. To-Do List   
 08/10/2017 9:00 AM  
  Appointment with [facility] CT RM 2 at [facility] RAD [address] ([phone]) ORAL CONTRAST/PREP  No oral contrast is needed for this exam.  DIET RESTRICTIONS  Nothing to eat or drink, 4 hours prior to study  May have water to take meds  GENERAL INSTRUCTIONS  If you were given premedications for IV contrast to take prior to having your study, please arrange to have someone drive you to your appointment. If you have had a creatinine level drawn within the past 30 days, please bring most recent results to your appt. MEDICATIONS  Bring a complete list of all medications you are currently taking to include prescriptions, over-the-counter meds, herbals, vitamins & any dietary supplements. RELATED STUDY INFORMATION  Bring any films, CDs, and reports related with you on the day of your exam.  This only includes studies done outside of [address] [place] [address], [facility], [facility], and [facility]. QUESTIONS  Notify the CT Department if you have any questions concerning your study. [facility] - [phone] [facility] - [phone] Patient Instructions A Healthy Lifestyle: Care Instructions Your Care Instructions A healthy lifestyle can help you feel good, stay at a healthy weight, and have plenty of energy for both work and play. A healthy lifestyle is something you can share with your whole family. A healthy lifestyle also can lower your risk for serious health problems, such as high blood pressure, heart disease, and diabetes. You can follow a few steps listed below to improve your health and the health of your family. Follow-up care is a key part of your treatment and safety. Be sure to make and go to all appointments, and call your doctor if you are having problems. Its also a good idea to know your test results and keep a list of the medicines you take. How can you care for yourself at home? · Do not eat too much sugar, fat, or fast foods. You can still have dessert and treats now and then. The goal is moderation. · Start small to improve your eating habits. Pay attention to portion sizes, drink less juice and soda pop, and eat more fruits and vegetables. ¨ Eat a healthy amount of food. A 3-ounce serving of meat, for example, is about the size of a deck of cards. Fill the rest of your plate with vegetables and whole grains. ¨ Limit the amount of soda and sports drinks you have every day. Drink more water when you are thirsty. ¨ Eat at least 5 servings of fruits and vegetables every day. It may seem like a lot, but it is not hard to reach this goal. A serving or helping is 1 piece of fruit, 1 cup of vegetables, or 2 cups of leafy, raw vegetables. Have an apple or some carrot sticks as an afternoon snack instead of a candy bar. Try to have fruits and/or vegetables at every meal. 
· Make exercise part of your daily routine. You may want to start with simple activities, such as walking, bicycling, or slow swimming. Try to be active 30 to 60 minutes every day. You do not need to do all 30 to 60 minutes all at once. For example, you can exercise 3 times a day for 10 or 20 minutes. Moderate exercise is safe for most people, but it is always a good idea to talk to your doctor before starting an exercise program. 
· Keep moving. Erika Kaelyn the lawn, work in the garden, or [employer]. Take the stairs instead of the elevator at work. · If you smoke, quit. People who smoke have an increased risk for heart attack, stroke, cancer, and other lung illnesses. Quitting is hard, but there are ways to boost your chance of quitting tobacco for good. ¨ Use nicotine gum, patches, or lozenges. ¨ Ask your doctor about stop-smoking programs and medicines. ¨ Keep trying. In addition to reducing your risk of diseases in the future, you will notice some benefits soon after you stop using tobacco. If you have shortness of breath or asthma symptoms, they will likely get better within a few weeks after you quit. · Limit how much alcohol you drink. Moderate amounts of alcohol (up to 2 drinks a day for men, 1 drink a day for women) are okay.  But drinking too much can lead to liver problems, high blood pressure, and other health problems. Family health If you have a family, there are many things you can do together to improve your health. · Eat meals together as a family as often as possible. · Eat healthy foods. This includes fruits, vegetables, lean meats and dairy, and whole grains. · Include your family in your fitness plan. Most people think of activities such as jogging or tennis as the way to fitness, but there are many ways you and your family can be more active. Anything that makes you breathe hard and gets your heart pumping is exercise. Here are some tips: 
¨ Walk to do errands or to take your child to school or the bus. ¨ Go for a family bike ride after dinner instead of watching TV. Where can you learn more? Go to http://birdie-juany.info/. Enter F659 in the search box to learn more about \"A Healthy Lifestyle: Care Instructions. \" Current as of: July 26, 2016 Content Version: 11.3 © 6703-7880 "[employer]. Care instructions adapted under license by [employer] (which disclaims liability or warranty for this information). If you have questions about a medical condition or this instruction, always ask your healthcare professional. [address] any warranty or liability for your use of this information. Introducing [facility] & HEALTH SERVICES! Dear Camila Mijares: Thank you for requesting a [employer] account. Our records indicate that you already have an active [employer] account. You can access your account anytime at [email or web address]. [email or web address] Did you know that you can access your hospital and ER discharge instructions at any time in [employer]? You can also review all of your test results from your hospital stay or ER visit. Additional Information If you have questions, please visit the Frequently Asked Questions section of the [employer] website at [email or web address] Remember, [employer] is NOT to be used for urgent needs. For medical emergencies, dial 911. Now available from your iPhone and Android! Please provide this summary of care documentation to your next provider. Your primary care clinician is listed as Jon Hernández. If you have any questions after today's visit, please call [phone].

## 2017-08-08 ENCOUNTER — TELEPHONE (OUTPATIENT)
Dept: INTERNAL MEDICINE CLINIC | Age: 57
End: 2017-08-08

## 2017-08-10 ENCOUNTER — HOSPITAL ENCOUNTER (OUTPATIENT)
Dept: CT IMAGING | Age: 57
Discharge: HOME | End: 2017-08-10
Attending: UROLOGY
Payer: COMMERCIAL

## 2017-08-10 DIAGNOSIS — R82.998 URINE LEUKOCYTES: ICD-10-CM

## 2017-08-10 DIAGNOSIS — N28.89 LEFT RENAL MASS: ICD-10-CM

## 2017-08-10 DIAGNOSIS — C64.1 RENAL CELL CARCINOMA, RIGHT (HCC): ICD-10-CM

## 2017-08-10 PROCEDURE — 74176 CT ABD & PELVIS W/O CONTRAST: CPT

## 2017-08-14 ENCOUNTER — DOCUMENTATION ONLY (OUTPATIENT)
Dept: INTERNAL MEDICINE CLINIC | Age: 57
End: 2017-08-14

## 2017-09-06 PROBLEM — N28.89 LEFT RENAL MASS: Status: ACTIVE | Noted: 2017-08-18

## 2017-11-15 PROBLEM — I50.22 CHRONIC SYSTOLIC CONGESTIVE HEART FAILURE (HCC): Status: ACTIVE | Noted: 2017-11-15

## 2018-01-26 ENCOUNTER — OFFICE VISIT (OUTPATIENT)
Dept: INTERNAL MEDICINE CLINIC | Age: 58
End: 2018-01-26

## 2018-01-26 ENCOUNTER — HOSPITAL ENCOUNTER (OUTPATIENT)
Dept: LAB | Age: 58
Discharge: HOME | End: 2018-01-26
Payer: COMMERCIAL

## 2018-01-26 VITALS
OXYGEN SATURATION: 100 % | SYSTOLIC BLOOD PRESSURE: 128 MMHG | RESPIRATION RATE: 17 BRPM | DIASTOLIC BLOOD PRESSURE: 82 MMHG | HEIGHT: 66 IN | BODY MASS INDEX: 28 KG/M2 | WEIGHT: 174.2 LBS | HEART RATE: 73 BPM | TEMPERATURE: 98 F

## 2018-01-26 DIAGNOSIS — Z12.39 SCREENING FOR BREAST CANCER: ICD-10-CM

## 2018-01-26 DIAGNOSIS — T45.1X5A CHEMOTHERAPY INDUCED CARDIOMYOPATHY (HCC): ICD-10-CM

## 2018-01-26 DIAGNOSIS — N18.4 CONTROLLED TYPE 2 DIABETES MELLITUS WITH STAGE 4 CHRONIC KIDNEY DISEASE, WITHOUT LONG-TERM CURRENT USE OF INSULIN (HCC): ICD-10-CM

## 2018-01-26 DIAGNOSIS — C64.2 MALIGNANT NEOPLASM OF LEFT KIDNEY (HCC): ICD-10-CM

## 2018-01-26 DIAGNOSIS — Z12.11 SCREENING FOR COLON CANCER: ICD-10-CM

## 2018-01-26 DIAGNOSIS — Z00.00 WELLNESS EXAMINATION: ICD-10-CM

## 2018-01-26 DIAGNOSIS — Z85.3 HX: BREAST CANCER: ICD-10-CM

## 2018-01-26 DIAGNOSIS — E05.20 TOXIC MULTINODULAR GOITER: ICD-10-CM

## 2018-01-26 DIAGNOSIS — G47.33 OBSTRUCTIVE SLEEP APNEA SYNDROME, MODERATE: ICD-10-CM

## 2018-01-26 DIAGNOSIS — N18.4 CHRONIC KIDNEY DISEASE (CKD) STAGE G4/A1, SEVERELY DECREASED GLOMERULAR FILTRATION RATE (GFR) BETWEEN 15-29 ML/MIN/1.73 SQUARE METER AND ALBUMINURIA CREATININE RATIO LESS THAN 30 MG/G (HCC): ICD-10-CM

## 2018-01-26 DIAGNOSIS — I50.22 CHRONIC SYSTOLIC CONGESTIVE HEART FAILURE (HCC): ICD-10-CM

## 2018-01-26 DIAGNOSIS — E11.22 CONTROLLED TYPE 2 DIABETES MELLITUS WITH STAGE 4 CHRONIC KIDNEY DISEASE, WITHOUT LONG-TERM CURRENT USE OF INSULIN (HCC): Primary | ICD-10-CM

## 2018-01-26 DIAGNOSIS — E11.22 CONTROLLED TYPE 2 DIABETES MELLITUS WITH STAGE 4 CHRONIC KIDNEY DISEASE, WITHOUT LONG-TERM CURRENT USE OF INSULIN (HCC): ICD-10-CM

## 2018-01-26 DIAGNOSIS — I42.7 CHEMOTHERAPY INDUCED CARDIOMYOPATHY (HCC): ICD-10-CM

## 2018-01-26 DIAGNOSIS — Z23 ENCOUNTER FOR IMMUNIZATION: ICD-10-CM

## 2018-01-26 DIAGNOSIS — N18.4 CONTROLLED TYPE 2 DIABETES MELLITUS WITH STAGE 4 CHRONIC KIDNEY DISEASE, WITHOUT LONG-TERM CURRENT USE OF INSULIN (HCC): Primary | ICD-10-CM

## 2018-01-26 LAB
ALBUMIN SERPL-MCNC: 4.2 G/DL (ref 3.4–5)
ALBUMIN/GLOB SERPL: 1.4 {RATIO} (ref 0.8–1.7)
ALP SERPL-CCNC: 275 U/L (ref 45–117)
ALT SERPL-CCNC: 41 U/L (ref 13–56)
ANION GAP SERPL CALC-SCNC: 11 MMOL/L (ref 3–18)
APPEARANCE UR: CLEAR
AST SERPL-CCNC: 30 U/L (ref 15–37)
BACTERIA URNS QL MICRO: NEGATIVE /HPF
BASOPHILS # BLD: 0.1 K/UL (ref 0–0.06)
BASOPHILS NFR BLD: 1 % (ref 0–2)
BILIRUB SERPL-MCNC: 0.5 MG/DL (ref 0.2–1)
BILIRUB UR QL: NEGATIVE
BUN SERPL-MCNC: 37 MG/DL (ref 7–18)
BUN/CREAT SERPL: 18 (ref 12–20)
CALCIUM SERPL-MCNC: 9.2 MG/DL (ref 8.5–10.1)
CALCIUM SERPL-MCNC: 9.5 MG/DL (ref 8.5–10.1)
CHLORIDE SERPL-SCNC: 111 MMOL/L (ref 100–108)
CHOLEST SERPL-MCNC: 165 MG/DL
CO2 SERPL-SCNC: 20 MMOL/L (ref 21–32)
COLOR UR: YELLOW
CREAT SERPL-MCNC: 2.02 MG/DL (ref 0.6–1.3)
CREAT UR-MCNC: 69 MG/DL (ref 30–125)
DIFFERENTIAL METHOD BLD: ABNORMAL
EOSINOPHIL # BLD: 0.1 K/UL (ref 0–0.4)
EOSINOPHIL NFR BLD: 2 % (ref 0–5)
EPITH CASTS URNS QL MICRO: NORMAL /LPF (ref 0–5)
ERYTHROCYTE [DISTWIDTH] IN BLOOD BY AUTOMATED COUNT: 15.2 % (ref 11.6–14.5)
FERRITIN SERPL-MCNC: 11 NG/ML (ref 8–388)
FOLATE SERPL-MCNC: 5.8 NG/ML (ref 3.1–17.5)
GLOBULIN SER CALC-MCNC: 3 G/DL (ref 2–4)
GLUCOSE SERPL-MCNC: 102 MG/DL (ref 74–99)
GLUCOSE UR STRIP.AUTO-MCNC: NEGATIVE MG/DL
HCT VFR BLD AUTO: 40.2 % (ref 35–45)
HDLC SERPL-MCNC: 44 MG/DL (ref 40–60)
HDLC SERPL: 3.8 {RATIO} (ref 0–5)
HGB BLD-MCNC: 12.5 G/DL (ref 12–16)
HGB UR QL STRIP: NEGATIVE
IRON SATN MFR SERPL: 12 %
IRON SERPL-MCNC: 50 UG/DL (ref 50–175)
KETONES UR QL STRIP.AUTO: NEGATIVE MG/DL
LDLC SERPL CALC-MCNC: 105.2 MG/DL (ref 0–100)
LEUKOCYTE ESTERASE UR QL STRIP.AUTO: ABNORMAL
LIPID PROFILE,FLP: ABNORMAL
LYMPHOCYTES # BLD: 1.6 K/UL (ref 0.9–3.6)
LYMPHOCYTES NFR BLD: 27 % (ref 21–52)
MAGNESIUM SERPL-MCNC: 2.1 MG/DL (ref 1.6–2.6)
MCH RBC QN AUTO: 27.9 PG (ref 24–34)
MCHC RBC AUTO-ENTMCNC: 31.1 G/DL (ref 31–37)
MCV RBC AUTO: 89.7 FL (ref 74–97)
MONOCYTES # BLD: 0.3 K/UL (ref 0.05–1.2)
MONOCYTES NFR BLD: 6 % (ref 3–10)
NEUTS SEG # BLD: 3.9 K/UL (ref 1.8–8)
NEUTS SEG NFR BLD: 64 % (ref 40–73)
NITRITE UR QL STRIP.AUTO: NEGATIVE
PH UR STRIP: 5.5 [PH] (ref 5–8)
PHOSPHATE SERPL-MCNC: 4.4 MG/DL (ref 2.5–4.9)
PLATELET # BLD AUTO: 244 K/UL (ref 135–420)
PMV BLD AUTO: 9.6 FL (ref 9.2–11.8)
POTASSIUM SERPL-SCNC: 4.5 MMOL/L (ref 3.5–5.5)
PROT SERPL-MCNC: 7.2 G/DL (ref 6.4–8.2)
PROT UR STRIP-MCNC: NEGATIVE MG/DL
PROT UR-MCNC: 20 MG/DL
PROT/CREAT UR-RTO: 0.3
PTH-INTACT SERPL-MCNC: 107.1 PG/ML (ref 18.4–88)
RBC # BLD AUTO: 4.48 M/UL (ref 4.2–5.3)
RBC #/AREA URNS HPF: NORMAL /HPF (ref 0–5)
RETICS/RBC NFR AUTO: 1.4 % (ref 0.5–2.3)
SODIUM SERPL-SCNC: 142 MMOL/L (ref 136–145)
SP GR UR REFRACTOMETRY: 1.01 (ref 1–1.03)
T4 FREE SERPL-MCNC: 1.3 NG/DL (ref 0.7–1.5)
TIBC SERPL-MCNC: 410 UG/DL (ref 250–450)
TRIGL SERPL-MCNC: 79 MG/DL (ref ?–150)
TSH SERPL DL<=0.05 MIU/L-ACNC: 0.13 UIU/ML (ref 0.36–3.74)
URATE SERPL-MCNC: 8.5 MG/DL (ref 2.6–7.2)
UROBILINOGEN UR QL STRIP.AUTO: 0.2 EU/DL (ref 0.2–1)
VIT B12 SERPL-MCNC: 773 PG/ML (ref 211–911)
VLDLC SERPL CALC-MCNC: 15.8 MG/DL
WBC # BLD AUTO: 6 K/UL (ref 4.6–13.2)
WBC URNS QL MICRO: NORMAL /HPF (ref 0–4)

## 2018-01-26 PROCEDURE — 85025 COMPLETE CBC W/AUTO DIFF WBC: CPT | Performed by: INTERNAL MEDICINE

## 2018-01-26 PROCEDURE — 80074 ACUTE HEPATITIS PANEL: CPT | Performed by: INTERNAL MEDICINE

## 2018-01-26 PROCEDURE — 82728 ASSAY OF FERRITIN: CPT | Performed by: INTERNAL MEDICINE

## 2018-01-26 PROCEDURE — 83970 ASSAY OF PARATHORMONE: CPT | Performed by: INTERNAL MEDICINE

## 2018-01-26 PROCEDURE — 82607 VITAMIN B-12: CPT | Performed by: INTERNAL MEDICINE

## 2018-01-26 PROCEDURE — 85045 AUTOMATED RETICULOCYTE COUNT: CPT | Performed by: INTERNAL MEDICINE

## 2018-01-26 PROCEDURE — 83735 ASSAY OF MAGNESIUM: CPT | Performed by: INTERNAL MEDICINE

## 2018-01-26 PROCEDURE — 82306 VITAMIN D 25 HYDROXY: CPT | Performed by: INTERNAL MEDICINE

## 2018-01-26 PROCEDURE — 87389 HIV-1 AG W/HIV-1&-2 AB AG IA: CPT | Performed by: INTERNAL MEDICINE

## 2018-01-26 PROCEDURE — 81001 URINALYSIS AUTO W/SCOPE: CPT | Performed by: INTERNAL MEDICINE

## 2018-01-26 PROCEDURE — 84550 ASSAY OF BLOOD/URIC ACID: CPT | Performed by: INTERNAL MEDICINE

## 2018-01-26 PROCEDURE — 80061 LIPID PANEL: CPT | Performed by: INTERNAL MEDICINE

## 2018-01-26 PROCEDURE — 80053 COMPREHEN METABOLIC PANEL: CPT | Performed by: INTERNAL MEDICINE

## 2018-01-26 PROCEDURE — 82043 UR ALBUMIN QUANTITATIVE: CPT | Performed by: INTERNAL MEDICINE

## 2018-01-26 PROCEDURE — 84156 ASSAY OF PROTEIN URINE: CPT | Performed by: INTERNAL MEDICINE

## 2018-01-26 PROCEDURE — 83540 ASSAY OF IRON: CPT | Performed by: INTERNAL MEDICINE

## 2018-01-26 PROCEDURE — 84439 ASSAY OF FREE THYROXINE: CPT | Performed by: INTERNAL MEDICINE

## 2018-01-26 PROCEDURE — 84100 ASSAY OF PHOSPHORUS: CPT | Performed by: INTERNAL MEDICINE

## 2018-01-26 NOTE — PROGRESS NOTES
JAYLAN Jarrett is a 61 y.o. female with relevant past medical history of right breast cancer s/p surgery (mastectomy and reconstruction) and chemotherapy with associated chemo-induced cardiomyopathy, CHF (EF 50% 2/28/17), HLD, h/o gout, toxic multinodular goiter, CLARENCE, diabetes mellitus type 2- diet controlled, CKD stage IV (following with Dr. Gus White), h/o renal cancer [facility]) s/p R kidney partial resection (superior pole 3/22/16) and left kidney radical nephrectomy (8/18/17) who presents today to establish medical care. She denies any active symptoms or new concerns today. In terms of her cancer history, she is due for mammogram and she is scheduled to follow up with Dr. Aisha Khan (uro oncology) for follow up of renal cancer, around March 2018. She tells me her appetite, weight, energy levels are all well, unchanged. In terms of her h/o cardiomyopathy and CHF, she denies any chest pain, shortness of breath, dizziness, headaches, palpitations, leg swelling, claudication, orthopnea, NPD. She is taking lisinopril (5mg BID), carvedilol 25 mg PO BID and lipitor 20 mg daily. She also takes furosemide 40 mg daily PRN in case of leg swelling or fluid retention as prescribed by her cardiologist, but she tells me she has not needed to take this medication in many months. She reports compliance with her medications. She is not on baby aspirin. She denies any symptoms consistent with gout, usually it affects her great toe, it has been years since the last flare. She is not preventive medication for it. She tells me her diabetes is well controlled on diet alone and weight control. She is due for eye exam, foot exam, vaccines and microalbuminuria testing, to be done today. She believes she had pneumococcal vaccination 2 years ago when she received her last influenza vaccine.      In terms of her renal failure, she is following with Dr. Gus White, but she is interested in switching specialist. She denies nausea, vomiting, dysuria, frequency, flank pain, hematuria. She has history of toxic multinodular goiter, she says she probably had an ultrasound about 2 years ago, but would like to follow up for stability of lesions, denies neck pain and is aware of goiter. She is due for colonoscopy She says she had one more than 10 years ago at [facility] that was normal. Denies any family history of colon cancer, breast cancer or any type of cancer that she is aware off. She denies abdminal pain, constipation, diarrhea, change in stool caliber, melena or blood in her stool. Review of Systems   Constitutional: Negative. HENT: Negative. Eyes: Negative. Respiratory: Negative. Cardiovascular: Negative. Gastrointestinal: Negative. Genitourinary: Negative. Musculoskeletal: Negative. Skin: Negative. Neurological: Negative. Endo/Heme/Allergies: Negative. Psychiatric/Behavioral: Negative. Past Medical History  Past Medical History:   Diagnosis Date    Cancer [facility]) 2000    Right breast ca     Congestive heart failure, unspecified     Gout     H/O total mastectomy of right breast     Heart disease     Hx: UTI (urinary tract infection)     Hypercholesterolemia     Renal cell carcinoma of left kidney ([facility] 12/17/15    Renal mass, left      Past Surgical History:   Procedure Laterality Date    HX CHOLECYSTECTOMY      HX MASTECTOMY Right     HX NEPHRECTOMY Right 3/22/16    Partial x2, Dr. Giuliana Dorsey, [facility]    HX ORTHOPAEDIC  04/19/2017    Trigger finger release R hand    HX RENAL BIOPSY Right 12/17/15    Ct guided bx, Dr. Janny Caruso, [facility]        Family History  Family History   Problem Relation Age of Onset    Hypertension Father     Heart Attack Father     Heart Failure Father     Diabetes Father        Social History  She  reports that she has never smoked.  She has never used smokeless tobacco.   History   Alcohol Use No       Immunization History  Immunization History Administered Date(s) Administered    Influenza Vaccine (Quad) PF 11/17/2016, 01/26/2018    Tdap 01/26/2018       Allergies  Allergies   Allergen Reactions    Codeine Not Reported This Time, Other (comments) and Swelling     Throat Swelling    Penicillins Angioedema and Other (comments)     N/V, swelling    Sulfa (Sulfonamide Antibiotics) Other (comments)     itching    Sulfur Hives       Medications  Current Outpatient Prescriptions   Medication Sig    furosemide (LASIX) 40 mg tablet TAKE 1 TABLET BY MOUTH ONCE A DAY AS NEEDED    lisinopril (PRINIVIL, ZESTRIL) 5 mg tablet Take 5 mg by mouth two (2) times a day.  multivitamin (ONE A DAY) tablet Take 1 Tab by mouth daily.  atorvastatin (LIPITOR) 20 mg tablet Take 20 mg by mouth daily.  COREG 25 mg tablet two (2) times a day.  ondansetron (ZOFRAN ODT) 8 mg disintegrating tablet 8 mg.  ascorbic acid (VITAMIN C) 500 mg tablet 500 mg.  cyanocobalamin ER 1,000 mcg tablet Take 1 Tab by Mouth Once a Day.  cholecalciferol (VITAMIN D3) 1,000 unit tablet Take  by mouth daily.  calcium 500 mg tab Take 500 mg by mouth daily. No current facility-administered medications for this visit. Visit Vitals    /82 (BP 1 Location: Left arm, BP Patient Position: Sitting)    Pulse 73    Temp 98 °F (36.7 °C) (Oral)    Resp 17    Ht 5' 6\" (1.676 m)    Wt 174 lb 3.2 oz (79 kg)    SpO2 100%    BMI 28.12 kg/m2     Body mass index is 28.12 kg/(m^2). Physical Exam   Constitutional: She is oriented to person, place, and time and well-developed, well-nourished, and in no distress. No distress. HENT:   Head: Normocephalic and atraumatic. Right Ear: External ear normal.   Left Ear: External ear normal.   Nose: Nose normal.   Mouth/Throat: Oropharynx is clear and moist. No oropharyngeal exudate. Eyes: Conjunctivae and EOM are normal. Pupils are equal, round, and reactive to light. No scleral icterus. Neck: Normal range of motion. Neck supple. No JVD present. No tracheal deviation present. Thyromegaly present. Cardiovascular: Normal rate, regular rhythm, normal heart sounds and intact distal pulses. Pulmonary/Chest: Effort normal and breath sounds normal. No stridor. No respiratory distress. She has no wheezes. She has no rales. She exhibits no tenderness. Abdominal: Soft. Bowel sounds are normal. She exhibits no distension and no mass. There is no tenderness. There is no guarding. Musculoskeletal: Normal range of motion. She exhibits no edema, tenderness or deformity. Foot exam done  No skin lesions, callus or ulcers  Adequate distal perfusion, capillary refill <2 sec and pedial pulses ++ BL. Full ROM, no gross sensory deficits   Lymphadenopathy:     She has no cervical adenopathy. Neurological: She is alert and oriented to person, place, and time. No cranial nerve deficit. Gait normal. Coordination normal. GCS score is 15. Skin: Skin is warm. No rash noted. She is not diaphoretic. No erythema. No pallor. Psychiatric: Mood, memory, affect and judgment normal.         REVIEW OF DATA    Labs  No visits with results within 1 Month(s) from this visit.   Latest known visit with results is:    Abstract on 10/17/2017   Component Date Value Ref Range Status    Creatinine, External 07/31/2017 1.90   Final    Creatinine, External 02/22/2017 1.79   Final    Creatinine, External 11/09/2015 1.62   Final    Urine Microalbumin, External 06/15/2017 30.3   Final    Urine Microalbumin, External 11/09/2015 9.8   Final       Imaging  Relevant imaging reviewed as indicated    Other  No other pertinent to this encounter         DIAGNOSIS AND PLAN  Patient Active Problem List   Diagnosis Code    [facility] E11.29    Muscle cramps R25.2    Peripheral edema R60.9    HX: breast cancer Z85.3    Chemotherapy induced cardiomyopathy ([facility] I42.7, T45.1X5A    Obstructive sleep apnea syndrome, moderate G47.33    Toxic multinodular goiter E05.20    Malignant neoplasm of left kidney (HCC) C64.2    Chronic kidney disease (CKD) stage G4/A1, severely decreased glomerular filtration rate (GFR) between 15-29 mL/min/1.73 square meter and albuminuria creatinine ratio less than 30 mg/g (HCC) N18.4    Chronic kidney disease (CKD) stage G3a/A1, moderately decreased glomerular filtration rate (GFR) between 45-59 mL/min/1.73 square meter and albuminuria creatinine ratio less than 30 mg/g N18.3    Left renal mass N28.89    Chronic systolic congestive heart failure (HCC) I50.22     1. Controlled type 2 diabetes mellitus with stage 4 chronic kidney disease, without long-term current use of insulin ([facility]  - Follow up with nephrology will try referring to a different provider per patient request  - Follow up with opthalmology- referral done  - Labs as ordered including microalbuminuria  - Foot exam done today  - METABOLIC PANEL, COMPREHENSIVE; Future  - URINALYSIS W/ RFLX MICROSCOPIC; Future  - MICROALBUMIN, UR, RAND W/ MICROALBUMIN/CREA RATIO; Future  - FERRITIN; Future  - URIC ACID; Future  - PTH INTACT; Future  - VITAMIN B12 & FOLATE; Future  - MAGNESIUM; Future  - PHOSPHORUS; Future  - RETICULOCYTE COUNT; Future  - REFERRAL TO OPHTHALMOLOGY  - IRON PROFILE; Future  - PROTEIN/CREATININE RATIO, URINE ([facility] Only); Future    2. Chemotherapy induced cardiomyopathy ([facility]  - Stable clinically and hemodynamic parametes  - C/w present therapy (ACEI, statin, lasix PRN, consider adding ASA 81 mg daily, depending on lab results)    3. Chronic kidney disease (CKD) stage G4/A1, severely decreased glomerular filtration rate (GFR) between 15-29 mL/min/1.73 square meter and albuminuria creatinine ratio less than 30 mg/g ([facility])  See 1.  - METABOLIC PANEL, COMPREHENSIVE; Future  - FERRITIN; Future  - URIC ACID; Future  - PTH INTACT; Future  - VITAMIN B12 & FOLATE; Future  - MAGNESIUM;  Future  - PHOSPHORUS; Future  - RETICULOCYTE COUNT; Future  - IRON PROFILE; Future  - PROTEIN/CREATININE RATIO, URINE ([facility] Only); Future    4. Chronic systolic congestive heart failure (HCC)  - Stable clinically and hemodynamic parametes  - C/w present therapy (ACEI, statin, lasix PRN, consider adding ASA 81 mg daily, depending on lab results)     5. Malignant neoplasm of left kidney [facility])  h/o renal cancer (HCC) s/p R kidney partial resection (superior pole 3/22/16) and left kidney radical nephrectomy (8/18/17)   - Follow up with Dr. Giuliana Dorsey with prior labs and imaging studies as requested (depending on renal function may not be a good candidate for contrast CT A/P)    6. Toxic multinodular goiter  - TSH AND FREE T4; Future  - US THYROID/PARATHYROID/SOFT TISS; Future    7. Obstructive sleep apnea syndrome, moderate  - Clinically stable, on no specific therapy    8. HX: breast cancer  In remission  - screening mammogram due (ordered)    9. Screening for colon cancer  Ref to GI for screening colonoscopy  - [facility]    10. Screening for breast cancer  - [facility] MAMMO BI SCREENING INCL CAD; Future    11. Wellness examination  Full physical exam and screening labs as ordered  - CBC WITH AUTOMATED DIFF; Future  - HEPATITIS PANEL, ACUTE; Future  - HIV 1/2 AG/AB, 4TH GENERATION,W RFLX CONFIRM; Future  - LIPID PANEL; Future  - VITAMIN D, 25 HYDROXY; Future  - TSH AND FREE T4; Future  - METABOLIC PANEL, COMPREHENSIVE; Future  - URINALYSIS W/ RFLX MICROSCOPIC; Future  - TETANUS, DIPHTHERIA TOXOIDS AND ACELLULAR PERTUSSIS VACCINE (TDAP), IN INDIVIDS. >=7, IM  - [facility]    12. Encounter for immunization  - Influenza virus vaccine (QUADRIVALENT PRES FREE SYRINGE) IM (36289)  - Tdap ordered as well. - May need pneumococcal vaccination, per patient received 2 years ago, unclear if prevnar 13 or pneumovax, we will discuss again next visit. Follow-up Disposition:  Return in about 2 months (around 3/26/2018). Stella Hinkle Miguel Angel Taylor MD

## 2018-01-26 NOTE — MR AVS SNAPSHOT
[address] [id number] [facility], [address] 
[phone] Patient: Jose Hargrove MRN: [id number] TER:[id number]/[id number] Visit Information Date & Time Provider Department Dept. Phone Encounter #  
 1/26/2018  8:30 AM Ruben Nichols MD [facility] [id number] Your Appointments 3/14/2018  9:15 AM  
ESTABLISHED PATIENT with Conchis Bernabe MD  
[facility] Appt Note: 6mo F/U, Rev Imaging- to be done at [facility]  
 [id number] [facility] [address] [facility] [address]  
[address] [facility] [address] [facility] [address] [facility] [address] Upcoming Health Maintenance Date Due  
 FOOT EXAM Q1 4/22/1969 Pneumococcal 19-64 Highest Risk (1 of 3 - PCV13) 4/22/1978 DTaP/Tdap/Td series (1 - Tdap) 4/22/1980 PAP AKA CERVICAL CYTOLOGY 4/22/1980 FOBT Q 1 YEAR AGE 50-75 4/22/2009 Influenza Age 5 to Adult 8/1/2017 HEMOGLOBIN A1C Q6M 8/21/2017 EYE EXAM RETINAL OR DILATED Q1 12/19/2017 LIPID PANEL Q1 2/21/2018 MICROALBUMIN Q1 6/15/2018 BREAST CANCER SCRN MAMMOGRAM 11/10/2018 Allergies as of 1/26/2018  Review Complete On: 1/26/2018 By: Ruben Nichols MD  
  
 Severity Noted Reaction Type Reactions Codeine  10/19/2015    Not Reported This Time, Other (comments), Swelling Throat Swelling Penicillins  07/31/2015    Angioedema, Other (comments) N/V, swelling Sulfa (Sulfonamide Antibiotics)  04/20/2016    Other (comments)  
 itching Sulfur  07/31/2015    Hives Current Immunizations  Never Reviewed Name Date Influenza Vaccine (Mdck Quadrivalent)  Incomplete Influenza Vaccine (Quad) PF 11/17/2016 Tdap  Incomplete Not reviewed this visit You Were Diagnosed With   
  
 Codes Comments  Controlled type 2 diabetes mellitus with stage 4 chronic kidney disease, without long-term current use of insulin (HCC)    -  Primary ICD-10-CM: E11.22, N18.4 ICD-9-CM: 250.40, 585.4 Chemotherapy induced cardiomyopathy ([facility]     ICD-10-CM: I42.7, T45.1X5A 
ICD-9-CM: 425.9, E933.1 Chronic kidney disease (CKD) stage G4/A1, severely decreased glomerular filtration rate (GFR) between 15-29 mL/min/1.73 square meter and albuminuria creatinine ratio less than 30 mg/g (HCC)     ICD-10-CM: N18.4 ICD-9-CM: 257. 4 Chronic systolic congestive heart failure (HCC)     ICD-10-CM: I50.22 ICD-9-CM: 428.22, 428.0 Malignant neoplasm of left kidney [facility])     ICD-10-CM: C64.2 ICD-9-CM: 189.0 Toxic multinodular goiter     ICD-10-CM: E05.20 ICD-9-CM: 242.20 Obstructive sleep apnea syndrome, moderate     ICD-10-CM: G47.33 
ICD-9-CM: 327.23 HX: breast cancer     ICD-10-CM: Z85.3 ICD-9-CM: V10.3 Screening for colon cancer     ICD-10-CM: Z12.11 ICD-9-CM: V76.51 Screening for breast cancer     ICD-10-CM: Z12.31 
ICD-9-CM: V76.10 Wellness examination     ICD-10-CM: Z00.00 ICD-9-CM: V70.0 Encounter for immunization     ICD-10-CM: T47 ICD-9-CM: V03.89 Vitals BP Pulse Temp Resp Height(growth percentile) Weight(growth percentile) 128/82 (BP 1 Location: Left arm, BP Patient Position: Sitting) 73 98 °F (36.7 °C) (Oral) 17 5' 6\" (1.676 m) 174 lb 3.2 oz (79 kg) SpO2 BMI OB Status Smoking Status 100% 28.12 kg/m2 Postmenopausal Never Smoker Vitals History BMI and BSA Data Body Mass Index Body Surface Area  
 28.12 kg/m 2 1.92 m 2 Preferred Pharmacy Pharmacy Name Phone [address], [address] [place] [address] [phone] Your Updated Medication List  
  
   
This list is accurate as of: 1/26/18 10:27 AM.  Always use your most recent med list.  
  
  
  
  
 ascorbic acid (vitamin C) 500 mg tablet Commonly known as:  VITAMIN C  
500 mg.  
  
 atorvastatin 20 mg tablet Commonly known as:  LIPITOR Take 20 mg by mouth daily. calcium 500 mg Tab Take 500 mg by mouth daily. COREG 25 mg tablet Generic drug:  carvedilol  
two (2) times a day. cyanocobalamin ER 1,000 mcg tablet Take 1 Tab by Mouth Once a Day. furosemide 40 mg tablet Commonly known as:  LASIX TAKE 1 TABLET BY MOUTH ONCE A DAY AS NEEDED  
  
 lisinopril 5 mg tablet Commonly known as:  Ladona Dunks Take 5 mg by mouth two (2) times a day. multivitamin tablet Commonly known as:  ONE A DAY Take 1 Tab by mouth daily. ondansetron 8 mg disintegrating tablet Commonly known as:  ZOFRAN ODT  
8 mg. VITAMIN D3 1,000 unit tablet Generic drug:  cholecalciferol Take  by mouth daily. We Performed the Following INFLUENZA VACCINE (CCIIV4 VACCINE ANTIBIO FREE 0.5 ML) [40895 CPT(R)] REFERRAL TO GASTROENTEROLOGY [IHD74 Custom] REFERRAL TO OBSTETRICS AND GYNECOLOGY [REF51 Custom] REFERRAL TO OPHTHALMOLOGY [REF57 Custom] TETANUS, DIPHTHERIA TOXOIDS AND ACELLULAR PERTUSSIS VACCINE (TDAP), IN INDIVIDS. >=7, IM [id number] CPT(R)] To-Do List   
 01/26/2018 Lab:  CBC WITH AUTOMATED DIFF   
  
 01/26/2018 Lab:  FERRITIN   
  
 01/26/2018 Lab:  HEPATITIS PANEL, ACUTE   
  
 01/26/2018 Lab:  HIV 1/2 AG/AB, 4TH GENERATION,W RFLX CONFIRM   
  
 01/26/2018 Lab:  IRON PROFILE   
  
 01/26/2018 Lab:  LIPID PANEL   
  
 01/26/2018 Imaging:  [facility] MAMMO BI SCREENING INCL CAD   
  
 01/26/2018 Lab:  METABOLIC PANEL, COMPREHENSIVE   
  
 01/26/2018 Lab:  MICROALBUMIN, UR, RAND W/ MICROALBUMIN/CREA RATIO   
  
 01/26/2018 Lab:  PROTEIN/CREATININE RATIO, URINE   
  
 01/26/2018 Lab:  PTH INTACT   
  
 01/26/2018 Lab:  TSH AND FREE T4   
  
 01/26/2018 Lab:  URIC ACID   
  
 01/26/2018 Lab:  URINALYSIS W/ RFLX MICROSCOPIC   
  
 01/26/2018 Imaging:  US THYROID/PARATHYROID/SOFT TISS   
  
 01/26/2018 Lab: VITAMIN B12 & FOLATE   
  
 01/26/2018 Lab:  VITAMIN D, 25 HYDROXY Around 03/27/2018 Lab:  RETICULOCYTE COUNT Around 04/26/2018 Lab:  MAGNESIUM Around 04/26/2018 Lab:  PHOSPHORUS Referral Information Referral ID Referred By Referred To  
  
 [id number] Javier Vazquez Not Available Visits Status Start Date End Date 1 New Request 1/26/18 1/26/19 If your referral has a status of pending review or denied, additional information will be sent to support the outcome of this decision. Referral ID Referred By Referred To  
 [id number] Pierre Taylor, [address] [place] [address] Road Phone: [phone] Fax: [phone] Visits Status Start Date End Date 1 New Request 1/26/18 1/26/19 If your referral has a status of pending review or denied, additional information will be sent to support the outcome of this decision. Referral ID Referred By Referred To  
 [id number] Pierre Taylor, [address] [facility] Phone: [phone] Fax: [phone] Visits Status Start Date End Date 1 New Request 1/26/18 1/26/19 If your referral has a status of pending review or denied, additional information will be sent to support the outcome of this decision. Introducing [facility]! Dear Billy Freeman: Thank you for requesting a [employer] account. Our records indicate that you already have an active [employer] account. You can access your account anytime at [email or web address] Did you know that you can access your hospital and ER discharge instructions at any time in [employer]? You can also review all of your test results from your hospital stay or ER visit. Additional Information If you have questions, please visit the Frequently Asked Questions section of the Sinbad: online travellers clubhart website at [email or web address] Remember, [employer] is NOT to be used for urgent needs. For medical emergencies, dial 911. Now available from your iPhone and Android! Please provide this summary of care documentation to your next provider. Your primary care clinician is listed as Armani Abbott. If you have any questions after today's visit, please call [phone].

## 2018-01-26 NOTE — PROGRESS NOTES
1. Have you been to the ER, urgent care clinic or hospitalized since your last visit? NO.     2. Have you seen or consulted any other health care providers outside of the [address] [place] [address] since your last visit (Include any pap smears or colon screening)? YES  Dr Faye Mayfield [facility], Dr Ludmila Jeffries Nephrologist     Do you have an Advanced Directive? NO    Would you like information on Advanced Directives?  YES

## 2018-01-27 LAB
25(OH)D3 SERPL-MCNC: 28.2 NG/ML (ref 30–100)
CREAT UR-MCNC: 65.54 MG/DL (ref 30–125)
MICROALBUMIN UR-MCNC: 4.4 MG/DL (ref 0–3)
MICROALBUMIN/CREAT UR-RTO: 67 MG/G (ref 0–30)

## 2018-01-29 LAB
HAV IGM SER QL: NEGATIVE
HBV CORE IGM SER QL: NEGATIVE
HBV SURFACE AG SER QL: <0.1 INDEX
HBV SURFACE AG SER QL: NEGATIVE
HCV AB SER IA-ACNC: 0.04 INDEX
HCV AB SERPL QL IA: NEGATIVE
HCV COMMENT,HCGAC: NORMAL
HIV 1+2 AB+HIV1 P24 AG SERPL QL IA: NONREACTIVE
HIV12 RESULT COMMENT, HHIVC: NORMAL
SP1: NORMAL
SP2: NORMAL
SP3: NORMAL

## 2018-02-28 ENCOUNTER — TELEPHONE (OUTPATIENT)
Dept: INTERNAL MEDICINE CLINIC | Age: 58
End: 2018-02-28

## 2018-02-28 NOTE — TELEPHONE ENCOUNTER
Labs discussed over the phone with the patient. All questions answered to the best of my knowledge and patient satisfaction. Stella Sweet MD

## 2018-03-20 ENCOUNTER — HOSPITAL ENCOUNTER (OUTPATIENT)
Dept: LAB | Age: 58
Discharge: HOME | End: 2018-03-20
Payer: COMMERCIAL

## 2018-03-20 ENCOUNTER — OFFICE VISIT (OUTPATIENT)
Dept: INTERNAL MEDICINE CLINIC | Age: 58
End: 2018-03-20

## 2018-03-20 VITALS
RESPIRATION RATE: 18 BRPM | TEMPERATURE: 98 F | OXYGEN SATURATION: 98 % | WEIGHT: 171.2 LBS | BODY MASS INDEX: 27.51 KG/M2 | DIASTOLIC BLOOD PRESSURE: 78 MMHG | HEART RATE: 78 BPM | HEIGHT: 66 IN | SYSTOLIC BLOOD PRESSURE: 122 MMHG

## 2018-03-20 DIAGNOSIS — R31.9 URINARY TRACT INFECTION WITH HEMATURIA, SITE UNSPECIFIED: ICD-10-CM

## 2018-03-20 DIAGNOSIS — R31.9 URINARY TRACT INFECTION WITH HEMATURIA, SITE UNSPECIFIED: Primary | ICD-10-CM

## 2018-03-20 DIAGNOSIS — N39.0 URINARY TRACT INFECTION WITH HEMATURIA, SITE UNSPECIFIED: Primary | ICD-10-CM

## 2018-03-20 DIAGNOSIS — N39.0 URINARY TRACT INFECTION WITH HEMATURIA, SITE UNSPECIFIED: ICD-10-CM

## 2018-03-20 LAB
APPEARANCE UR: ABNORMAL
BACTERIA URNS QL MICRO: ABNORMAL /HPF
BILIRUB UR QL STRIP: NEGATIVE
BILIRUB UR QL: NEGATIVE
COLOR UR: ABNORMAL
EPITH CASTS URNS QL MICRO: ABNORMAL /LPF (ref 0–5)
GLUCOSE UR STRIP.AUTO-MCNC: NEGATIVE MG/DL
GLUCOSE UR-MCNC: NEGATIVE MG/DL
HGB UR QL STRIP: ABNORMAL
KETONES P FAST UR STRIP-MCNC: NEGATIVE MG/DL
KETONES UR QL STRIP.AUTO: NEGATIVE MG/DL
LEUKOCYTE ESTERASE UR QL STRIP.AUTO: ABNORMAL
NITRITE UR QL STRIP.AUTO: POSITIVE
PH UR STRIP: 5.5 [PH] (ref 5–8)
PH UR STRIP: 6 [PH] (ref 4.6–8)
PROT UR QL STRIP: NORMAL
PROT UR STRIP-MCNC: ABNORMAL MG/DL
RBC #/AREA URNS HPF: ABNORMAL /HPF (ref 0–5)
SP GR UR REFRACTOMETRY: 1.01 (ref 1–1.03)
SP GR UR STRIP: 1.02 (ref 1–1.03)
UA UROBILINOGEN AMB POC: NORMAL (ref 0.2–1)
URINALYSIS CLARITY POC: NORMAL
URINALYSIS COLOR POC: NORMAL
URINE BLOOD POC: NORMAL
URINE LEUKOCYTES POC: NORMAL
URINE NITRITES POC: POSITIVE
UROBILINOGEN UR QL STRIP.AUTO: 1 EU/DL (ref 0.2–1)
WBC URNS QL MICRO: ABNORMAL /HPF (ref 0–4)

## 2018-03-20 PROCEDURE — 87186 SC STD MICRODIL/AGAR DIL: CPT | Performed by: INTERNAL MEDICINE

## 2018-03-20 PROCEDURE — 81001 URINALYSIS AUTO W/SCOPE: CPT | Performed by: INTERNAL MEDICINE

## 2018-03-20 PROCEDURE — 87086 URINE CULTURE/COLONY COUNT: CPT | Performed by: INTERNAL MEDICINE

## 2018-03-20 PROCEDURE — 87077 CULTURE AEROBIC IDENTIFY: CPT | Performed by: INTERNAL MEDICINE

## 2018-03-20 RX ORDER — CIPROFLOXACIN 500 MG/1
500 TABLET ORAL
Qty: 7 TAB | Refills: 0 | Status: SHIPPED | OUTPATIENT
Start: 2018-03-20 | End: 2018-05-30

## 2018-03-20 NOTE — PROGRESS NOTES
HPI     Christine Young is a 61 y.o. female with relevant past medical history of right breast cancer s/p surgery (mastectomy and reconstruction) and chemotherapy with associated chemo-induced cardiomyopathy, CHF (EF 50% 2/28/17), HLD, h/o gout, toxic multinodular goiter, CLARENCE, diabetes mellitus type 2- diet controlled, CKD stage IV, h/o renal cancer (HCC) s/p R kidney partial resection (superior pole 3/22/16) and left kidney radical nephrectomy (8/18/17)  who presents today for evaluation of dysuria and back pain. The patient tells me that 2 days ago she started with lower mid back pain and lower abdominal discomfort. The next day she started c/o dysuria, frequency and urgency. She did not notice macroscopic hematuria. She denies any fevers, but has had chills. She denies any diaphoresis, confusion, nausea, vomiting, malaise. She did notice yesterday mild leg swelling, but her weight did not increase significantly so she did not take furosemide. Her leg swelling has improved since. She also reports adequate blood sugar levels. ROS  As above included in HPI. Otherwise 11 point review of systems negative including constitutional, skin, HENT, eyes, respiratory, cardiovascular, gastrointestinal, genitourinary, musculoskeletal, endocrine, hematologic, allergy, and neurologic.     Past Medical History  Past Medical History:   Diagnosis Date    Cancer [facility]) 2000    Right breast ca     Congestive heart failure, unspecified     Gout     H/O total mastectomy of right breast     Heart disease     Hx: UTI (urinary tract infection)     Hypercholesterolemia     Renal cell carcinoma of left kidney ([facility] [address] 12/17/15    Renal mass, left      Past Surgical History:   Procedure Laterality Date    HX CHOLECYSTECTOMY      HX MASTECTOMY Right     HX NEPHRECTOMY Right 3/22/16    Partial x2, Dr. Brooklyn Tsang, [facility]    HX ORTHOPAEDIC  04/19/2017    Trigger finger release R hand    HX RENAL BIOPSY Right 12/17/15    Ct guided bx, Dr. Macario Suarez, [facility]        Family History  Family History   Problem Relation Age of Onset    Hypertension Father     Heart Attack Father     Heart Failure Father     Diabetes Father        Social History  She  reports that she has never smoked. She has never used smokeless tobacco. History   Alcohol Use No       Immunization History  Immunization History   Administered Date(s) Administered    Influenza Vaccine (Quad) PF 11/17/2016, 01/26/2018    Tdap 01/26/2018       Allergies  Allergies   Allergen Reactions    Codeine Not Reported This Time, Other (comments) and Swelling     Throat Swelling    Penicillins Angioedema and Other (comments)     N/V, swelling    Sulfa (Sulfonamide Antibiotics) Other (comments)     itching    Sulfur Hives       Medications  Current Outpatient Prescriptions   Medication Sig    ciprofloxacin HCl (CIPRO) 500 mg tablet Take 1 Tab by mouth once over twenty-four (24) hours. Indications: BACTERIAL URINARY TRACT INFECTION    ondansetron (ZOFRAN ODT) 8 mg disintegrating tablet 8 mg.  furosemide (LASIX) 40 mg tablet TAKE 1 TABLET BY MOUTH ONCE A DAY AS NEEDED    lisinopril (PRINIVIL, ZESTRIL) 5 mg tablet Take 5 mg by mouth two (2) times a day.  ascorbic acid (VITAMIN C) 500 mg tablet 500 mg.  cyanocobalamin ER 1,000 mcg tablet Take 1 Tab by Mouth Once a Day.  cholecalciferol (VITAMIN D3) 1,000 unit tablet Take  by mouth daily.  multivitamin (ONE A DAY) tablet Take 1 Tab by mouth daily.  atorvastatin (LIPITOR) 20 mg tablet Take 20 mg by mouth daily.  COREG 25 mg tablet two (2) times a day.  calcium 500 mg tab Take 500 mg by mouth daily. No current facility-administered medications for this visit.           Visit Vitals    /78 (BP 1 Location: Left arm, BP Patient Position: Sitting)    Pulse 78    Temp 98 °F (36.7 °C) (Oral)    Resp 18    Ht 5' 6\" (1.676 m)    Wt 171 lb 3.2 oz (77.7 kg)    SpO2 98%    BMI 27.63 kg/m2     Body mass index is 27.63 kg/(m^2). Physical Exam   Constitutional: She is oriented to person, place, and time and well-developed, well-nourished, and in no distress. HENT:   Head: Normocephalic and atraumatic. Eyes: Conjunctivae are normal. Pupils are equal, round, and reactive to light. Neck: Normal range of motion. Neck supple. Thyromegaly present. Cardiovascular: Normal rate, regular rhythm and normal heart sounds. Pulmonary/Chest: Effort normal and breath sounds normal.   Abdominal: Soft. She exhibits no mass. There is tenderness (lower abdomen). There is no rebound and no guarding. No CVA tenderness BL   Musculoskeletal: She exhibits no edema. Neurological: She is alert and oriented to person, place, and time. Skin: Skin is warm. Psychiatric: Mood and affect normal.   Nursing note and vitals reviewed. REVIEW OF DATA    Labs  No visits with results within 1 Month(s) from this visit. Latest known visit with results is:    Hospital Outpatient Visit on 01/26/2018   Component Date Value Ref Range Status    WBC 01/26/2018 6.0  4.6 - 13.2 K/uL Final    RBC 01/26/2018 4.48  4.20 - 5.30 M/uL Final    HGB 01/26/2018 12.5  12.0 - 16.0 g/dL Final    HCT 01/26/2018 40.2  35.0 - 45.0 % Final    MCV 01/26/2018 89.7  74.0 - 97.0 FL Final    MCH 01/26/2018 27.9  24.0 - 34.0 PG Final    MCHC 01/26/2018 31.1  31.0 - 37.0 g/dL Final    RDW 01/26/2018 15.2* 11.6 - 14.5 % Final    PLATELET 10/36/1761 523  135 - 420 K/uL Final    MPV 01/26/2018 9.6  9.2 - 11.8 FL Final    NEUTROPHILS 01/26/2018 64  40 - 73 % Final    LYMPHOCYTES 01/26/2018 27  21 - 52 % Final    MONOCYTES 01/26/2018 6  3 - 10 % Final    EOSINOPHILS 01/26/2018 2  0 - 5 % Final    BASOPHILS 01/26/2018 1  0 - 2 % Final    ABS. NEUTROPHILS 01/26/2018 3.9  1.8 - 8.0 K/UL Final    ABS. LYMPHOCYTES 01/26/2018 1.6  0.9 - 3.6 K/UL Final    ABS. MONOCYTES 01/26/2018 0.3  0.05 - 1.2 K/UL Final    ABS.  EOSINOPHILS 01/26/2018 0.1  0.0 - 0.4 K/UL Final    ABS. BASOPHILS 01/26/2018 0.1* 0.0 - 0.06 K/UL Final    DF 01/26/2018 AUTOMATED    Final    Hepatitis A, IgM 01/26/2018 NEGATIVE   NEG   Final    __ 01/26/2018        Final    Hepatitis B surface Ag 01/26/2018 <0.10  <1.00 Index Final    Hep B surface Ag Interp. 01/26/2018 NEGATIVE   NEG   Final    __ 01/26/2018        Final    Hepatitis B core, IgM 01/26/2018 NEGATIVE   NEG   Final    __ 01/26/2018        Final    Hepatitis C virus Ab 01/26/2018 0.04  <0.80 Index Final    Hep C  virus Ab Interp. 01/26/2018 NEGATIVE   NEG   Final    Hep C  virus Ab comment 01/26/2018        Final    HIV 1/2 Interpretation 01/26/2018 NONREACTIVE  NR   Final    HIV 1/2 result comment 01/26/2018 SEE NOTE    Final    LIPID PROFILE 01/26/2018        Final    Cholesterol, total 01/26/2018 165  <200 MG/DL Final    Triglyceride 01/26/2018 79  <150 MG/DL Final    HDL Cholesterol 01/26/2018 44  40 - 60 MG/DL Final    LDL, calculated 01/26/2018 105.2* 0 - 100 MG/DL Final    VLDL, calculated 01/26/2018 15.8  MG/DL Final    CHOL/HDL Ratio 01/26/2018 3.8  0 - 5.0   Final    Vitamin D 25-Hydroxy 01/26/2018 28.2* 30 - 100 ng/mL Final    TSH 01/26/2018 0.13* 0.36 - 3.74 uIU/mL Final    T4, Free 01/26/2018 1.3  0.7 - 1.5 NG/DL Final    Sodium 01/26/2018 142  136 - 145 mmol/L Final    Potassium 01/26/2018 4.5  3.5 - 5.5 mmol/L Final    Chloride 01/26/2018 111* 100 - 108 mmol/L Final    CO2 01/26/2018 20* 21 - 32 mmol/L Final    Anion gap 01/26/2018 11  3.0 - 18 mmol/L Final    Glucose 01/26/2018 102* 74 - 99 mg/dL Final    BUN 01/26/2018 37* 7.0 - 18 MG/DL Final    Creatinine 01/26/2018 2.02* 0.6 - 1.3 MG/DL Final    BUN/Creatinine ratio 01/26/2018 18  12 - 20   Final    GFR est AA 01/26/2018 31* >60 ml/min/1.73m2 Final    GFR est non-AA 01/26/2018 25* >60 ml/min/1.73m2 Final    Calcium 01/26/2018 9.2  8.5 - 10.1 MG/DL Final    Bilirubin, total 01/26/2018 0.5  0.2 - 1.0 MG/DL Final    ALT (SGPT) 01/26/2018 41  13 - 56 U/L Final    AST (SGOT) 01/26/2018 30  15 - 37 U/L Final    Alk. phosphatase 01/26/2018 275* 45 - 117 U/L Final    Protein, total 01/26/2018 7.2  6.4 - 8.2 g/dL Final    Albumin 01/26/2018 4.2  3.4 - 5.0 g/dL Final    Globulin 01/26/2018 3.0  2.0 - 4.0 g/dL Final    A-G Ratio 01/26/2018 1.4  0.8 - 1.7   Final    Color 01/26/2018 YELLOW    Final    Appearance 01/26/2018 CLEAR    Final    Specific gravity 01/26/2018 1.013  1.005 - 1.030   Final    pH (UA) 01/26/2018 5.5  5.0 - 8.0   Final    Protein 01/26/2018 NEGATIVE   NEG mg/dL Final    Glucose 01/26/2018 NEGATIVE   NEG mg/dL Final    Ketone 01/26/2018 NEGATIVE   NEG mg/dL Final    Bilirubin 01/26/2018 NEGATIVE   NEG   Final    Blood 01/26/2018 NEGATIVE   NEG   Final    Urobilinogen 01/26/2018 0.2  0.2 - 1.0 EU/dL Final    Nitrites 01/26/2018 NEGATIVE   NEG   Final    Leukocyte Esterase 01/26/2018 MODERATE* NEG   Final    Microalbumin,urine random 01/26/2018 4.40* 0 - 3.0 MG/DL Final    Creatinine, urine 01/26/2018 65.54  30 - 125 mg/dL Final    Microalbumin/Creat ratio (mg/g cre* 01/26/2018 67* 0 - 30 mg/g Final    Ferritin 01/26/2018 11  8 - 388 NG/ML Final    Uric acid 01/26/2018 8.5* 2.6 - 7.2 MG/DL Final    Calcium 01/26/2018 9.5  8.5 - 10.1 MG/DL Final    PTH, Intact 01/26/2018 107.1* 18.4 - 88.0 pg/mL Final    Vitamin B12 01/26/2018 773  211 - 911 pg/mL Final    Folate 01/26/2018 5.8  3.10 - 17.50 ng/mL Final    Magnesium 01/26/2018 2.1  1.6 - 2.6 mg/dL Final    Phosphorus 01/26/2018 4.4  2.5 - 4.9 MG/DL Final    Reticulocyte count 01/26/2018 1.4  0.5 - 2.3 % Final    Iron 01/26/2018 50  50 - 175 ug/dL Final    TIBC 01/26/2018 410  250 - 450 ug/dL Final    Iron % saturation 01/26/2018 12  % Final    Protein, urine random 01/26/2018 20* <11.9 mg/dL Final    Creatinine, urine 01/26/2018 69.00  30 - 125 mg/dL Final    Protein/Creat.  urine Ratio 01/26/2018 0.3    Final    WBC 01/26/2018 4 to 10  0 - 4 /hpf Final    RBC 01/26/2018 NONE  0 - 5 /hpf Final    Epithelial cells 01/26/2018 1+  0 - 5 /lpf Final    Bacteria 01/26/2018 NEGATIVE   NEG /hpf Final         CT Results (most recent):    Results from Hospital Encounter encounter on 08/10/17   CT ABD PELV WO CONT   Narrative CT ABDOMEN AND PELVIS WITHOUT CONTRAST      COMPARISON: MRI abdomen performed on October 30, 2015    INDICATIONS: Left renal mass    TECHNIQUE: Axially acquired sections through the abdomen and pelvis with 5 mm  sections are obtained. All CT scans at this facility are performed using dose optimization technique as  appropriate to a performed exam, to include automated exposure control,  adjustment of the mA and/or KV according to patient's size (including  appropriate matching for site-specific examinations), or use of iterative  reconstruction technique. FINDINGS: Extremely limited study performed without intravenous or oral contrast  material.    CT abdomen: Visualized portions of the lung bases demonstrate mild parenchymal  scarring involving the lingula. There is a small pericardial effusion measuring  up to 12 mm in thickness. No liver abnormalities are seen. Status post  cholecystectomy. The spleen is slightly enlarged measuring 13 cm in its long  length. The pancreas is normal. Adrenal glands are normal. Postoperative changes  are seen involving the lower pole of the right kidney. There is no definite  evidence of residual tumor. There is a 3.5 x 2.7 cm mass involving the lateral  aspect of an atrophic left kidney. This previously measured 2.7 x 1.9 cm on MRI  performed on October 30, 2015. A couple of additional very small low density  lesions are seen involving the lower pole of the left kidney. Small  retroperitoneal lymph nodes are seen without adenopathy. There is no  intra-abdominal adenopathy. CT pelvis: The bladder is normal. The uterus is normal. There may be a tiny  amount of free fluid in the pelvis, nonspecific.  I see no pelvic adenopathy. No  specific bowel abnormalities are seen. The appendix is normal. The patient is  status post TRAM procedure with right breast reconstruction. No significant osseous abnormalities are seen. Facet arthropathy is seen in the  lower lumbar spine. Impression Impression:       1. Limited study due to lack of intravenous contrast material.  2. Status post surgical resection of mass involving the lower pole of the right  kidney. No definitive evidence of residual tumor or metastatic disease. 3. There is an atrophic left kidney. There is a mass involving the left kidney  which has increased in size since October 30, 2015. 4. Very small amount of free fluid in the pelvis, nonspecific. 5. Small pericardial effusion. 6. Mild splenic enlargement of uncertain etiology. 7. Status post cholecystectomy. Additional chronic changes as described above. XR Results (most recent):    Results from Hospital Encounter encounter on 07/31/17   XR CHEST PA LAT   Narrative CHEST PA AND LATERAL     CPT CODE: 08199    HISTORY: Preop partial nephrectomy for left renal mass    COMPARISON: Chest x-ray April 13, 2017. FINDINGS:    PA and lateral views obtained. The cardiac silhouette is minimally enlarged. There is tortuosity of the aorta with calcified plaque. The lungs are slightly  hypoinflated. There is scarring at the lingula without change. Surgical clips  noted in the right axilla. . Pulmonary vascularity is normal. The costophrenic  angles are sharply defined. No bony abnormalities are seen. IMPRESSION:    Stable mild cardiomegaly. Atherosclerosis.          CT   All Micro Results     Procedure Component Value Units Date/Time    CULTURE, URINE [[id number]]     Order Status:  No result Specimen:  Urine     CULTURE, URINE [[id number]]     Order Status:  No result Specimen:  Urine           DIAGNOSIS AND PLAN  Patient Active Problem List   Diagnosis Code    Type 2 diabetes mellitus with renal manifestations ([facility] [address] E11.29    Muscle cramps R25.2    Peripheral edema R60.9    HX: breast cancer Z85.3    Chemotherapy induced cardiomyopathy (HCC) I42.7, T45.1X5A    Obstructive sleep apnea syndrome, moderate G47.33    Toxic multinodular goiter E05.20    Malignant neoplasm of left kidney (HCC) C64.2    Chronic kidney disease (CKD) stage G4/A1, severely decreased glomerular filtration rate (GFR) between 15-29 mL/min/1.73 square meter and albuminuria creatinine ratio less than 30 mg/g (HCC) N18.4    Chronic kidney disease (CKD) stage G3a/A1, moderately decreased glomerular filtration rate (GFR) between 45-59 mL/min/1.73 square meter and albuminuria creatinine ratio less than 30 mg/g N18.3    Left renal mass N28.89    Chronic systolic congestive heart failure (HCC) I50.22     1. Urinary tract infection with hematuria, site unspecified  Urine dipstick and clinical symptoms consistent with UTI  - UA and UC sent  - Empiric therapy with renally dosed ciprofloxacin 500 mg PO daily for 7 days (GFR <30)  - Told to seek care back if persistent symptoms or worsening symptoms despite antibiotic therapy. - I will contact patient in case we need to modify her antibiotic regimen based on urine culture results. Follow-up Disposition:  Return if symptoms worsen or fail to improve. Stella Allen MD

## 2018-03-20 NOTE — MR AVS SNAPSHOT
[address] [facility] 
 
 
 [address], [address] [place] [address] [facility] 
[phone] Patient: Christine Young MRN: [id number] WIP:[birth year] Visit Information Date & Time Provider Department Dept. Phone Encounter #  
 3/20/2018  9:30 AM Yasmine Severino MD [facility] [phone] [id number] Your Appointments 4/27/2018  8:00 AM  
Office Visit with Yasmine Severino MD  
[facility] [address] [facility]) Appt Note: ov 3mos. sascha  
 [address], [address] [place] [address] [address] [place]  
  
   
 [address], [address]  
  
    
 5/2/2018 11:30 AM  
ESTABLISHED PATIENT with Jane Ferrell MD  
[facility] ([address] [facility]) [address] [facility] 10[address] [facility]  
  
   
 [address] [facility] [address] [address] [address] Upcoming Health Maintenance Date Due Pneumococcal 19-64 Highest Risk (1 of 3 - PCV13) 4/22/1978 PAP AKA CERVICAL CYTOLOGY 4/22/1980 FOBT Q 1 YEAR AGE 50-75 4/22/2009 EYE EXAM RETINAL OR DILATED Q1 12/19/2017 HEMOGLOBIN A1C Q6M 7/26/2018 BREAST CANCER SCRN MAMMOGRAM 11/10/2018 FOOT EXAM Q1 1/26/2019 MICROALBUMIN Q1 1/26/2019 LIPID PANEL Q1 1/26/2019 DTaP/Tdap/Td series (2 - Td) 1/26/2028 Allergies as of 3/20/2018  Review Complete On: 3/20/2018 By: Estevan Waggoner LPN Severity Noted Reaction Type Reactions Codeine  10/19/2015    Not Reported This Time, Other (comments), Swelling Throat Swelling Penicillins  07/31/2015    Angioedema, Other (comments) N/V, swelling Sulfa (Sulfonamide Antibiotics)  04/20/2016    Other (comments)  
 itching Sulfur  07/31/2015    Hives Current Immunizations  Never Reviewed Name Date Influenza Vaccine (Quad) PF 1/26/2018, 11/17/2016 Tdap 1/26/2018 Not reviewed this visit You Were Diagnosed With   
  
 Codes Comments Urinary tract infection with hematuria, site unspecified    -  Primary ICD-10-CM: N39.0, R31.9 ICD-9-CM: 599.0 Vitals BP Pulse Temp Resp Height(growth percentile) Weight(growth percentile) 122/78 (BP 1 Location: Left arm, BP Patient Position: Sitting) 78 98 °F (36.7 °C) (Oral) 18 5' 6\" (1.676 m) 171 lb 3.2 oz (77.7 kg) SpO2 BMI OB Status Smoking Status 98% 27.63 kg/m2 Postmenopausal Never Smoker Vitals History BMI and BSA Data Body Mass Index Body Surface Area  
 27.63 kg/m 2 1.9 m 2 Preferred Pharmacy Pharmacy Name Phone [address], [address] [place] [address] [phone] Your Updated Medication List  
  
   
This list is accurate as of 3/20/18 10:12 AM.  Always use your most recent med list.  
  
  
  
  
 ascorbic acid (vitamin C) 500 mg tablet Commonly known as:  VITAMIN C  
500 mg.  
  
 atorvastatin 20 mg tablet Commonly known as:  LIPITOR Take 20 mg by mouth daily. calcium 500 mg Tab Take 500 mg by mouth daily. COREG 25 mg tablet Generic drug:  carvedilol  
two (2) times a day. cyanocobalamin ER 1,000 mcg tablet Take 1 Tab by Mouth Once a Day. furosemide 40 mg tablet Commonly known as:  LASIX TAKE 1 TABLET BY MOUTH ONCE A DAY AS NEEDED  
  
 lisinopril 5 mg tablet Commonly known as:  Cleotis Truong Take 5 mg by mouth two (2) times a day. multivitamin tablet Commonly known as:  ONE A DAY Take 1 Tab by mouth daily. ondansetron 8 mg disintegrating tablet Commonly known as:  ZOFRAN ODT  
8 mg. VITAMIN D3 1,000 unit tablet Generic drug:  cholecalciferol Take  by mouth daily. We Performed the Following AMB POC URINALYSIS DIP STICK AUTO W/O MICRO [71281 CPT(R)] To-Do List   
 03/20/2018 Microbiology:  CULTURE, URINE   
  
 03/20/2018 Lab: URINALYSIS W/ RFLX MICROSCOPIC   
  
 03/26/2018 8:30 AM  
  Appointment with [facility] CT RM 1 at [facility] ([phone]) ORAL CONTRAST/PREP   Oral Contrast/Prep from radiology  no later than one day prior to study date/time. DIET RESTRICTIONS  Nothing to eat or drink, 4 hours prior to study  May have water to take meds  May drink oral contrast if directed  GENERAL INSTRUCTIONS  If you were given premedications for IV contrast to take prior to having your study, please arrange to have someone drive you to your appointment. If you have had a creatinine level drawn within the past 30 days, please bring most recent results to your appt. MEDICATIONS  Bring a complete list of all medications you are currently taking to include prescriptions, over-the-counter meds, herbals, vitamins & any dietary supplements. RELATED STUDY INFORMATION  Bring any films, CDs, and reports related with you on the day of your exam.  This only includes studies done outside of [address] [place] [address], [place] [address], [place], and [facility]. QUESTIONS  Notify the CT Department if you have any questions concerning your study. [place] - [phone] [facility] [phone] Introducing [facility]! Dear Chris Hoyos: Thank you for requesting a [employer] account. Our records indicate that you already have an active [employer] account. You can access your account anytime at [email or web address] Did you know that you can access your hospital and ER discharge instructions at any time in [employer]? You can also review all of your test results from your hospital stay or ER visit. Additional Information If you have questions, please visit the Frequently Asked Questions section of the [employer] website at [email or web address] Remember, [employer] is NOT to be used for urgent needs. For medical emergencies, dial 911. Now available from your iPhone and Android! Please provide this summary of care documentation to your next provider. Your primary care clinician is listed as Jose Juan Ferreira. If you have any questions after today's visit, please call [phone].

## 2018-03-22 LAB
BACTERIA SPEC CULT: ABNORMAL
SERVICE CMNT-IMP: ABNORMAL

## 2018-03-26 ENCOUNTER — HOSPITAL ENCOUNTER (OUTPATIENT)
Dept: CT IMAGING | Age: 58
Discharge: HOME | End: 2018-03-26
Attending: UROLOGY
Payer: COMMERCIAL

## 2018-03-26 DIAGNOSIS — C64.1 RENAL CELL CARCINOMA, RIGHT (HCC): ICD-10-CM

## 2018-03-26 DIAGNOSIS — C64.1 RENAL CELL CARCINOMA OF RIGHT KIDNEY (HCC): ICD-10-CM

## 2018-03-26 DIAGNOSIS — C64.2 RENAL CELL CARCINOMA OF LEFT KIDNEY (HCC): ICD-10-CM

## 2018-03-26 DIAGNOSIS — C64.2 RENAL CELL CARCINOMA, LEFT (HCC): ICD-10-CM

## 2018-03-26 PROCEDURE — 74176 CT ABD & PELVIS W/O CONTRAST: CPT

## 2018-11-20 ENCOUNTER — HOSPITAL ENCOUNTER (OUTPATIENT)
Dept: ULTRASOUND IMAGING | Age: 58
Discharge: HOME | End: 2018-11-20
Attending: UROLOGY
Payer: COMMERCIAL

## 2018-11-20 ENCOUNTER — HOSPITAL ENCOUNTER (OUTPATIENT)
Dept: CT IMAGING | Age: 58
Discharge: HOME | End: 2018-11-20
Attending: UROLOGY
Payer: COMMERCIAL

## 2018-11-20 DIAGNOSIS — C64.2 MALIGNANT NEOPLASM OF LEFT KIDNEY (HCC): ICD-10-CM

## 2018-11-20 PROCEDURE — 74176 CT ABD & PELVIS W/O CONTRAST: CPT

## 2018-11-20 PROCEDURE — 76770 US EXAM ABDO BACK WALL COMP: CPT

## 2018-12-05 PROBLEM — E04.2 MULTINODULAR GOITER: Status: ACTIVE | Noted: 2018-06-20

## 2019-02-18 ENCOUNTER — HOSPITAL ENCOUNTER (OUTPATIENT)
Dept: LAB | Age: 59
Discharge: HOME | End: 2019-02-18
Payer: COMMERCIAL

## 2019-02-18 ENCOUNTER — OFFICE VISIT (OUTPATIENT)
Dept: INTERNAL MEDICINE CLINIC | Age: 59
End: 2019-02-18

## 2019-02-18 VITALS
BODY MASS INDEX: 28.61 KG/M2 | RESPIRATION RATE: 16 BRPM | DIASTOLIC BLOOD PRESSURE: 74 MMHG | TEMPERATURE: 97.5 F | SYSTOLIC BLOOD PRESSURE: 129 MMHG | HEART RATE: 76 BPM | OXYGEN SATURATION: 98 % | WEIGHT: 178 LBS | HEIGHT: 66 IN

## 2019-02-18 DIAGNOSIS — R10.9 FLANK PAIN: ICD-10-CM

## 2019-02-18 DIAGNOSIS — R39.15 URINARY URGENCY: Primary | ICD-10-CM

## 2019-02-18 LAB
BILIRUB UR QL STRIP: NEGATIVE
GLUCOSE UR-MCNC: NEGATIVE MG/DL
KETONES P FAST UR STRIP-MCNC: NEGATIVE MG/DL
Lab: NORMAL
PH UR STRIP: 5 [PH] (ref 4.6–8)
PROT UR QL STRIP: NEGATIVE
SP GR UR STRIP: 1.01 (ref 1–1.03)
UA UROBILINOGEN AMB POC: NORMAL (ref 0.2–1)
URINALYSIS CLARITY POC: CLEAR
URINALYSIS COLOR POC: NORMAL
URINE BLOOD POC: NEGATIVE
URINE LEUKOCYTES POC: NORMAL
URINE NITRITES POC: NEGATIVE

## 2019-02-18 PROCEDURE — 87086 URINE CULTURE/COLONY COUNT: CPT

## 2019-02-18 RX ORDER — CIPROFLOXACIN 500 MG/1
500 TABLET ORAL 2 TIMES DAILY
Qty: 6 TAB | Refills: 0 | Status: SHIPPED | OUTPATIENT
Start: 2019-02-18 | End: 2019-02-21

## 2019-02-18 NOTE — PROGRESS NOTES
David Powers is a 60 y.o.  female and presents with    Chief Complaint   Patient presents with    Urgency     x 4 days, patient denies any dysuria or hematuria.  Flank Pain     bilateral flank pain x 4 days    Urinary Odor       Subjective:  HPI  Mrs. Marcos Chowdary reports started Friday with low back pain, urgency frequency, denies fever, chills, n/v/d/c, CVA tenderness. She has used Cipro in the past without s/e. She has not tried AZO this episode but in the past without s/e. She has a history of CKD, malignant neoplasm left kidney, CHF, chemo induced cardiomyopathy, Type 2 DM. Additional Concerns: none     ROS   Review of Systems   Constitutional: Negative. Gastrointestinal: Negative. Genitourinary: Positive for dysuria, frequency and urgency. Negative for flank pain and hematuria. Neurological: Negative. Allergies   Allergen Reactions    Codeine Not Reported This Time, Other (comments) and Swelling     Throat Swelling    Penicillins Angioedema and Other (comments)     N/V, swelling    Sulfa (Sulfonamide Antibiotics) Other (comments)     itching    Sulfur Hives       Current Outpatient Medications   Medication Sig Dispense Refill    HYDROcodone-acetaminophen (NORCO) 5-325 mg per tablet Take 1 Tab by Mouth Every 4 Hours As Needed for Pain.  levothyroxine (SYNTHROID) 88 mcg tablet TAKE 1 TABLET BY MOUTH ONCE DAILY  3    ondansetron (ZOFRAN ODT) 8 mg disintegrating tablet 8 mg.  furosemide (LASIX) 40 mg tablet TAKE 1 TABLET BY MOUTH ONCE A DAY AS NEEDED  0    lisinopril (PRINIVIL, ZESTRIL) 5 mg tablet Take 5 mg by mouth two (2) times a day.  0    multivitamin (ONE A DAY) tablet Take 1 Tab by mouth daily.  atorvastatin (LIPITOR) 20 mg tablet Take 20 mg by mouth daily. 0    COREG 25 mg tablet two (2) times a day.  0    calcium 500 mg tab Take 500 mg by mouth daily.  ascorbic acid (VITAMIN C) 500 mg tablet 500 mg.       cyanocobalamin ER 1,000 mcg tablet Take 1 Tab by Mouth Once a Day.  cholecalciferol (VITAMIN D3) 1,000 unit tablet Take  by mouth daily.          Social History     Socioeconomic History    Marital status:      Spouse name: Not on file    Number of children: Not on file    Years of education: Not on file    Highest education level: Not on file   Social Needs    Financial resource strain: Not on file    Food insecurity - worry: Not on file    Food insecurity - inability: Not on file    Transportation needs - medical: Not on file   [employer] needs - non-medical: Not on file   Occupational History    Not on file   Tobacco Use    Smoking status: Never Smoker    Smokeless tobacco: Never Used   Substance and Sexual Activity    Alcohol use: No    Drug use: No    Sexual activity: Not on file   Other Topics Concern    Not on file   Social History Narrative    Not on file       Past Medical History:   Diagnosis Date    Cancer [facility]) 2000    Right breast ca     Congestive heart failure, unspecified     Gout     H/O total mastectomy of right breast     Heart disease     Hx: UTI (urinary tract infection)     Hypercholesterolemia     Renal cell carcinoma of left kidney ([facility] [address] 12/17/15    Renal mass, left        Past Surgical History:   Procedure Laterality Date    HX CHOLECYSTECTOMY      HX MASTECTOMY Right     HX NEPHRECTOMY Right 3/22/16    Partial x2, Dr. Zoe Severe, [facility] [place]    HX ORTHOPAEDIC  04/19/2017    Trigger finger release R hand    HX RENAL BIOPSY Right 12/17/15    Ct guided bx, Dr. Hamilton Arias, [facility] [place]    HX THYROIDECTOMY  6/20/2018       Family History   Problem Relation Age of Onset    Hypertension Father     Heart Attack Father     Heart Failure Father     Diabetes Father        Objective:  Vitals:    02/18/19 1527   BP: 129/74   Pulse: 76   Resp: 16   Temp: 97.5 °F (36.4 °C)   TempSrc: Oral   SpO2: 98%   Weight: 178 lb (80.7 kg)   Height: 5' 6\" (1.676 m)   PainSc:   6   PainLoc: Back       LABS   Results for orders placed or performed in visit on 02/18/19   AMB POC URINALYSIS DIP STICK AUTO W/O MICRO   Result Value Ref Range    Color (UA POC) Light Yellow     Clarity (UA POC) Clear     Glucose (UA POC) Negative Negative    Bilirubin (UA POC) Negative Negative    Ketones (UA POC) Negative Negative    Specific gravity (UA POC) 1.015 1.001 - 1.035    Blood (UA POC) Negative Negative    pH (UA POC) 5.0 4.6 - 8.0    Protein (UA POC) Negative Negative    Urobilinogen (UA POC) 0.2 mg/dL 0.2 - 1    Nitrites (UA POC) Negative Negative    Leukocyte esterase (UA POC) 1+ Negative    Culture Result         TESTS  none    PE  Physical Exam   Constitutional: She is oriented to person, place, and time. She appears well-developed and well-nourished. No distress. HENT:   Head: Normocephalic and atraumatic. Abdominal: Soft. Bowel sounds are normal. She exhibits no distension. There is no tenderness. Musculoskeletal: Normal range of motion. Neurological: She is alert and oriented to person, place, and time. Skin: Skin is warm and dry. She is not diaphoretic. Psychiatric: She has a normal mood and affect. Her behavior is normal. Judgment and thought content normal.   Vitals reviewed. Assessment/Plan:    1. Dysuria- POC UA with 1+ leuk otherwise normal, urine culture sent due to history, start AZO for symptom relief and if no relief start Ciprofloxacin. She understands to call if symptoms worsen. Lab review: orders written for new lab studies as appropriate; see orders    Today's Visit:   Diagnoses and all orders for this visit:    1. Urinary urgency  -     AMB POC URINALYSIS DIP STICK AUTO W/O MICRO  -     CULTURE, URINE; Future    2. Flank pain  -     AMB POC URINALYSIS DIP STICK AUTO W/O MICRO  -     CULTURE, URINE; Future      Health Maintenance: Deferred to PCP. I have discussed the diagnosis with the patient and the intended plan as seen in the above orders.   The patient has received an after-visit summary and questions were answered concerning future plans. I have discussed medication side effects and warnings with the patient as well. I have reviewed the plan of care with the patient, accepted their input and they are in agreement with the treatment goals. Follow-up Disposition: Not on File   More than 1/2 of this 15 minute visit was spent in counseling and coordination of care, as described above.     SAIRA Valencia  Internist of [address], [facility]  Phone: [phone]  Fax: [phone]

## 2019-02-18 NOTE — PROGRESS NOTES
Dolly Fitzgerald presents today for   Chief Complaint   Patient presents with    Urgency     x 4 days, patient denies any dysuria or hematuria.  Flank Pain     bilateral flank pain x 4 days    Urinary Odor              Depression Screening:  3 most recent PHQ Screens 2/18/2019   Little interest or pleasure in doing things Not at all   Feeling down, depressed, irritable, or hopeless Not at all   Total Score PHQ 2 0       Learning Assessment:  Learning Assessment 2/18/2019   PRIMARY LEARNER Patient   HIGHEST LEVEL OF EDUCATION - PRIMARY LEARNER  GRADUATED HIGH SCHOOL OR GED   BARRIERS PRIMARY LEARNER NONE   CO-LEARNER CAREGIVER No   PRIMARY LANGUAGE ENGLISH   LEARNER PREFERENCE PRIMARY DEMONSTRATION   ANSWERED BY patient   RELATIONSHIP SELF       Abuse Screening:  Abuse Screening Questionnaire 2/18/2019   Do you ever feel afraid of your partner? N   Are you in a relationship with someone who physically or mentally threatens you? N   Is it safe for you to go home? Y       Fall Risk  Fall Risk Assessment, last 12 mths 2/18/2019   Able to walk? Yes   Fall in past 12 months? No           Coordination of Care:  1. Have you been to the ER, urgent care clinic since your last visit? Hospitalized since your last visit? no    2. Have you seen or consulted any other health care providers outside of the [address] [place] [address] since your last visit? Include any pap smears or colon screening.  yes

## 2019-02-20 ENCOUNTER — TELEPHONE (OUTPATIENT)
Dept: INTERNAL MEDICINE CLINIC | Age: 59
End: 2019-02-20

## 2019-02-20 LAB
BACTERIA SPEC CULT: ABNORMAL
BACTERIA SPEC CULT: ABNORMAL
SERVICE CMNT-IMP: ABNORMAL

## 2019-02-20 NOTE — TELEPHONE ENCOUNTER
----- Message from Eduard Taylor NP sent at 2/20/2019  2:10 PM EST -----  The urine culture does not support a UTI. Can stop Ciprofloxacin.

## 2019-02-20 NOTE — TELEPHONE ENCOUNTER
Chief Complaint   Patient presents with    Labs     done 02-18-19 per NP Chester Flores      02-20-19 left a voice message to return my call.

## 2019-02-21 NOTE — TELEPHONE ENCOUNTER
Chief Complaint   Patient presents with    Labs     done 02-18-19 per RYNE Reynaga      02-21-19 due to the patient not returning my calls, I have sent a [facility] Message with the results, and medication directions per RYNE Reynaga. I have also left another voice message for the patient to return my call, and to check her [address] today.

## 2019-02-26 ENCOUNTER — TELEPHONE (OUTPATIENT)
Dept: INTERNAL MEDICINE CLINIC | Age: 59
End: 2019-02-26

## 2019-02-26 NOTE — TELEPHONE ENCOUNTER
Chief Complaint   Patient presents with    Labs     done 02-18-19 per NP Lv     Notes recorded by Jaclyn Goetz NP on 2/20/2019 at 2:10 PM EST  The urine culture does not support a UTI. Can stop Ciprofloxacin. 02-26-19 the patient returned my call, and informed of results, and understands to stop the Ciprofloxacin medication.

## 2019-03-13 ENCOUNTER — TELEPHONE (OUTPATIENT)
Dept: INTERNAL MEDICINE CLINIC | Age: 59
End: 2019-03-13

## 2019-03-13 NOTE — TELEPHONE ENCOUNTER
Pt called she said she just  Seen  Kidney specialist today  was told elizabeth blood count was low and she needs to see her pcp asap , I do not see any openings  Can she be worked in?

## 2019-03-14 NOTE — TELEPHONE ENCOUNTER
Pt is calling back, she needs to know a time to be seen tomorrow so she can adjust her schedule. Please call her ASAP at [phone].

## 2019-03-15 ENCOUNTER — HOSPITAL ENCOUNTER (OUTPATIENT)
Dept: LAB | Age: 59
Discharge: HOME | End: 2019-03-15
Payer: COMMERCIAL

## 2019-03-15 ENCOUNTER — OFFICE VISIT (OUTPATIENT)
Dept: INTERNAL MEDICINE CLINIC | Age: 59
End: 2019-03-15

## 2019-03-15 VITALS
RESPIRATION RATE: 17 BRPM | SYSTOLIC BLOOD PRESSURE: 124 MMHG | TEMPERATURE: 98.3 F | DIASTOLIC BLOOD PRESSURE: 70 MMHG | OXYGEN SATURATION: 98 % | HEIGHT: 66 IN | HEART RATE: 78 BPM | BODY MASS INDEX: 28.8 KG/M2 | WEIGHT: 179.2 LBS

## 2019-03-15 DIAGNOSIS — D50.9 IRON DEFICIENCY ANEMIA, UNSPECIFIED IRON DEFICIENCY ANEMIA TYPE: ICD-10-CM

## 2019-03-15 DIAGNOSIS — D50.9 IRON DEFICIENCY ANEMIA, UNSPECIFIED IRON DEFICIENCY ANEMIA TYPE: Primary | ICD-10-CM

## 2019-03-15 LAB
BASOPHILS # BLD: 0.1 K/UL (ref 0–0.1)
BASOPHILS NFR BLD: 1 % (ref 0–2)
DIFFERENTIAL METHOD BLD: ABNORMAL
EOSINOPHIL # BLD: 0.1 K/UL (ref 0–0.4)
EOSINOPHIL NFR BLD: 2 % (ref 0–5)
ERYTHROCYTE [DISTWIDTH] IN BLOOD BY AUTOMATED COUNT: 18.3 % (ref 11.6–14.5)
HCT VFR BLD AUTO: 28.1 % (ref 35–45)
HGB BLD-MCNC: 8.3 G/DL (ref 12–16)
LYMPHOCYTES # BLD: 1.5 K/UL (ref 0.9–3.6)
LYMPHOCYTES NFR BLD: 22 % (ref 21–52)
MCH RBC QN AUTO: 21.6 PG (ref 24–34)
MCHC RBC AUTO-ENTMCNC: 29.5 G/DL (ref 31–37)
MCV RBC AUTO: 73.2 FL (ref 74–97)
MONOCYTES # BLD: 0.5 K/UL (ref 0.05–1.2)
MONOCYTES NFR BLD: 7 % (ref 3–10)
NEUTS SEG # BLD: 4.4 K/UL (ref 1.8–8)
NEUTS SEG NFR BLD: 68 % (ref 40–73)
PLATELET # BLD AUTO: 310 K/UL (ref 135–420)
PLATELET COMMENTS,PCOM: ABNORMAL
PMV BLD AUTO: 9.6 FL (ref 9.2–11.8)
RBC # BLD AUTO: 3.84 M/UL (ref 4.2–5.3)
RBC MORPH BLD: ABNORMAL
WBC # BLD AUTO: 6.6 K/UL (ref 4.6–13.2)

## 2019-03-15 PROCEDURE — 85025 COMPLETE CBC W/AUTO DIFF WBC: CPT

## 2019-03-15 RX ORDER — PANTOPRAZOLE SODIUM 40 MG/1
40 TABLET, DELAYED RELEASE ORAL DAILY
Qty: 60 TAB | Refills: 1 | Status: SHIPPED | OUTPATIENT
Start: 2019-03-15 | End: 2019-10-11 | Stop reason: SDUPTHER

## 2019-03-19 ENCOUNTER — HOSPITAL ENCOUNTER (OUTPATIENT)
Dept: INFUSION THERAPY | Age: 59
Discharge: HOME | End: 2019-03-19
Payer: COMMERCIAL

## 2019-03-19 VITALS
OXYGEN SATURATION: 100 % | SYSTOLIC BLOOD PRESSURE: 120 MMHG | TEMPERATURE: 98.3 F | RESPIRATION RATE: 18 BRPM | HEART RATE: 75 BPM | DIASTOLIC BLOOD PRESSURE: 73 MMHG

## 2019-03-19 PROCEDURE — 74011000258 HC RX REV CODE- 258

## 2019-03-19 PROCEDURE — 96374 THER/PROPH/DIAG INJ IV PUSH: CPT

## 2019-03-19 PROCEDURE — 74011250636 HC RX REV CODE- 250/636: Performed by: INTERNAL MEDICINE

## 2019-03-19 RX ORDER — SODIUM CHLORIDE 900 MG/100ML
INJECTION INTRAVENOUS
Status: COMPLETED
Start: 2019-03-19 | End: 2019-03-19

## 2019-03-19 RX ORDER — SODIUM CHLORIDE 0.9 % (FLUSH) 0.9 %
10-40 SYRINGE (ML) INJECTION AS NEEDED
Status: DISCONTINUED | OUTPATIENT
Start: 2019-03-19 | End: 2019-03-23 | Stop reason: HOSPADM

## 2019-03-19 RX ADMIN — SODIUM CHLORIDE 50 ML: 900 INJECTION INTRAVENOUS at 15:10

## 2019-03-19 RX ADMIN — Medication 10 ML: at 15:50

## 2019-03-19 RX ADMIN — FERRIC CARBOXYMALTOSE INJECTION 750 MG: 50 INJECTION, SOLUTION INTRAVENOUS at 15:10

## 2019-03-19 RX ADMIN — Medication 10 ML: at 15:20

## 2019-03-19 RX ADMIN — Medication 10 ML: at 15:10

## 2019-03-19 NOTE — PROGRESS NOTES
[facility] Progress Note Date: 2019 Name: Jacek Leonard MRN: [id number] : 1960 Injectafer Infusion (Left arm use only) Ms. Carcamo to [facility], ambulatory, at 1500. Pt was assessed and education was provided. Ms. Erik Schwab vitals were reviewed and patient was observed for 5 minutes prior to treatment. Visit Vitals /73 Pulse 75 Temp 98.3 °F (36.8 °C) Resp 18 SpO2 100% Breastfeeding? No  
 
Left arm use only. 24 g PIV placed in left ac x 1 attempt. PIV flushed easily and had brisk blood return. Injectafer 750 mg was initiated @ 280 ml/hr over 15 minutes IVPB. 3 minutes into infusion, VS stable and pt denied complaints of itching, lip/tongue/facial swelling, SOB, CP or other complaints. Ms. Elva Almaguer tolerated the infusion, and had no complaints. VS remained stable. PIV flushed with NS 10 ml and removed. No bleeding or hematoma noted at site. Guaze and coban applied. Patient stayed for 30 minute observation period. Reviewed discharge instructions with patient, including expected side effects (abdominal cramping, nausea, changes in color of urine or feces) and signs of allergic reaction requiring medical attention (itching/hives/rashes, SOB, chest pain, lip/tongue/facial swelling). Patient given printed copy to take home. Patient verbalized understanding of discharge instructions. Patient armband removed and shredded. Ms. Elva Almaguer was discharged from [place] [address] in stable condition at [phone]. She is to return in one week for 2 of 2 on 3/26/19. Efren Hylton RN 
2019 
[id number]

## 2019-03-22 ENCOUNTER — OFFICE VISIT (OUTPATIENT)
Dept: INTERNAL MEDICINE CLINIC | Age: 59
End: 2019-03-22

## 2019-03-22 VITALS
HEIGHT: 66 IN | RESPIRATION RATE: 17 BRPM | HEART RATE: 82 BPM | WEIGHT: 179 LBS | TEMPERATURE: 98.3 F | SYSTOLIC BLOOD PRESSURE: 128 MMHG | OXYGEN SATURATION: 99 % | DIASTOLIC BLOOD PRESSURE: 86 MMHG | BODY MASS INDEX: 28.77 KG/M2

## 2019-03-22 DIAGNOSIS — D50.9 IRON DEFICIENCY ANEMIA, UNSPECIFIED IRON DEFICIENCY ANEMIA TYPE: Primary | ICD-10-CM

## 2019-03-22 NOTE — PROGRESS NOTES
HPI     Oscar Russ is a 60 y.o. female with relevant past medical history of right breast cancer s/p surgery (mastectomy and reconstruction) and chemotherapy with associated chemo-induced cardiomyopathy, CHF (EF 50% 2/28/17), HLD, h/o gout, toxic multinodular goiter, CLARENCE, diabetes mellitus type 2- diet controlled, CKD stage IV, h/o renal cancer (HCC) s/p R kidney partial resection (superior pole 3/22/16) and left kidney radical nephrectomy (8/18/17)  who presents today for evaluation of iron deficiency anemia. Hb 3 gr drop on labs done at nephrology office early March compared to November labs, down to 7.4 and low iron levels. The patient does report fatigue, weakness, but no CP, HA, palpitations or SOB. She was recommended to start taking oral iron and follow up with PCP. She has not noticed dark or bloody stools. She reports mild upper abdominal discomfort and nausea. No other symptoms reported. ROS  As above included in HPI. Otherwise 11 point review of systems negative including constitutional, skin, HENT, eyes, respiratory, cardiovascular, gastrointestinal, genitourinary, musculoskeletal, endocrine, hematologic, allergy, and neurologic.     Past Medical History  Past Medical History:   Diagnosis Date    Cancer [facility]) 2000    Right breast ca     Congestive heart failure, unspecified     Gout     H/O total mastectomy of right breast     Heart disease     Hx: UTI (urinary tract infection)     Hypercholesterolemia     Renal cell carcinoma of left kidney ([facility] [address] 12/17/15    Renal mass, left      Past Surgical History:   Procedure Laterality Date    HX CHOLECYSTECTOMY      HX MASTECTOMY Right     HX NEPHRECTOMY Right 3/22/16    Partial x2, Dr. Elva Burnett, [facility]    HX ORTHOPAEDIC  04/19/2017    Trigger finger release R hand    HX RENAL BIOPSY Right 12/17/15    Ct guided bx, Dr. Joanie Montana, [facility]    HX THYROIDECTOMY  6/20/2018        Family History  Family History   Problem Relation Age of Onset    Hypertension Father     Heart Attack Father     Heart Failure Father     Diabetes Father        Social History  She  reports that she has never smoked. She has never used smokeless tobacco.   Social History     Substance and Sexual Activity   Alcohol Use No       Immunization History  Immunization History   Administered Date(s) Administered    Influenza Vaccine (Quad) PF 11/17/2016, 01/26/2018    Tdap 01/26/2018       Allergies  Allergies   Allergen Reactions    Codeine Not Reported This Time, Other (comments) and Swelling     Throat Swelling    Penicillins Angioedema and Other (comments)     N/V, swelling    Sulfa (Sulfonamide Antibiotics) Other (comments)     itching    Sulfur Hives       Medications  Current Outpatient Medications   Medication Sig    pantoprazole (PROTONIX) 40 mg tablet Take 1 Tab by mouth daily.  levothyroxine (SYNTHROID) 88 mcg tablet TAKE 1 TABLET BY MOUTH ONCE DAILY    ondansetron (ZOFRAN ODT) 8 mg disintegrating tablet 8 mg.  furosemide (LASIX) 40 mg tablet TAKE 1 TABLET BY MOUTH ONCE A DAY AS NEEDED    lisinopril (PRINIVIL, ZESTRIL) 5 mg tablet Take 10 mg by mouth two (2) times a day.  ascorbic acid (VITAMIN C) 500 mg tablet 500 mg.  cyanocobalamin ER 1,000 mcg tablet Take 1 Tab by Mouth Once a Day.  cholecalciferol (VITAMIN D3) 1,000 unit tablet Take  by mouth daily.  atorvastatin (LIPITOR) 20 mg tablet Take 20 mg by mouth daily.  COREG 25 mg tablet two (2) times a day.  calcium 500 mg tab Take 500 mg by mouth daily.  HYDROcodone-acetaminophen (NORCO) 5-325 mg per tablet Take 1 Tab by Mouth Every 4 Hours As Needed for Pain.  multivitamin (ONE A DAY) tablet Take 1 Tab by mouth daily. No current facility-administered medications for this visit.       Facility-Administered Medications Ordered in Other Visits   Medication Dose Route Frequency    sodium chloride (NS) flush 10-40 mL  10-40 mL IntraVENous PRN         Visit Vitals  /70 (BP 1 Location: Left arm, BP Patient Position: Sitting)   Pulse 78   Temp 98.3 °F (36.8 °C) (Oral)   Resp 17   Ht 5' 6\" (1.676 m)   Wt 179 lb 3.2 oz (81.3 kg)   SpO2 98%   BMI 28.92 kg/m²     Body mass index is 28.92 kg/m². Physical Exam   Constitutional: She is oriented to person, place, and time and well-developed, well-nourished, and in no distress. HENT:   Head: Normocephalic and atraumatic. Eyes: Pupils are equal, round, and reactive to light. Conjunctivae are normal.   Neck: Normal range of motion. Neck supple. Thyromegaly present. Cardiovascular: Normal rate, regular rhythm and normal heart sounds. Pulmonary/Chest: Effort normal and breath sounds normal.   Abdominal: Soft. She exhibits no mass. There is no tenderness (lower abdomen). There is no rebound and no guarding. No CVA tenderness BL   Musculoskeletal: She exhibits no edema. Neurological: She is alert and oriented to person, place, and time. Skin: Skin is warm. Psychiatric: Mood and affect normal.   Nursing note and vitals reviewed. [address] Outpatient Visit on 02/18/2019   Component Date Value Ref Range Status    Special Requests: 02/18/2019 NO SPECIAL REQUESTS    Final    Culture result: 02/18/2019     Final                    Value:[id number]  COLONIES/mL  MIXED GRAM POSITIVE DUNG, PROBABLE SKIN/GENITAL CONTAMINATION.       Culture result: 02/18/2019 <10,000 COLONIES/mL TWO DIFFERENT GRAM NEGATIVE RODS*   Final   Office Visit on 02/18/2019   Component Date Value Ref Range Status    Color (UA POC) 02/18/2019 Light Yellow   Final    Clarity (UA POC) 02/18/2019 Clear   Final    Glucose (UA POC) 02/18/2019 Negative  Negative Final    Bilirubin (UA POC) 02/18/2019 Negative  Negative Final    Ketones (UA POC) 02/18/2019 Negative  Negative Final    Specific gravity (UA POC) 02/18/2019 1.015  1.001 - 1.035 Final    Blood (UA POC) 02/18/2019 Negative  Negative Final    pH (UA POC) 02/18/2019 5.0  4.6 - 8.0 Final    Protein (UA POC) 02/18/2019 Negative  Negative Final    Urobilinogen (UA POC) 02/18/2019 0.2 mg/dL  0.2 - 1 Final    Nitrites (UA POC) 02/18/2019 Negative  Negative Final    Leukocyte esterase (UA POC) 02/18/2019 1+  Negative Final         CT Results (most recent):  Results from [place] encounter on 11/20/18   CT ABD PELV WO CONT    Narrative EXAM: CT scan abdomen and pelvis    CLINICAL HISTORY/INDICATION: Malignant neoplasm of left kidney. Left kidney  surgically absent. .    COMPARISON: CT scan abdomen and pelvis dated March 26, 2018. Andra Okeefe TECHNIQUE: All CT scans at this facility are performed using dose optimization  technique as appropriate to a performed exam, to include automated exposure  control, adjustment of the mA and/or kV according to patient's size (including  appropriate matching for site-specific examinations), or use of iterative  reconstruction technique. Andra Okeefe FINDINGS:    A CT scan of the patient's abdomen and pelvis is performed with multiple axial  images from the dome of the diaphragm to the symphysis. Sagittal and coronal  images are reconstructed. The lung bases show interstitial scarring bilateral  posterior pleural thickening a trace right pleural effusion and a pericardial  effusion. These all appear stable when compared with the previous study. The  attenuation coefficients of the liver, spleen, pancreas and right kidney appear  uniform. The adrenal glands are seen bilaterally and appear normal. No evidence  for right renal calculi or obstructive uropathy is present. The gallbladder  surgically absent. The aorta is normal in caliber. Noted surrounding the  proximal aorta is the appearance of multiple small lymph nodes all measuring  well less than 1 cm. These are unchanged when compared with the previous study. No dilated loops of bowel, free fluid or free air is seen in the peritoneum.  The  appendix is visualized and appears normal. Within the pelvis the uterus appears  normal in size shape and contour. No adnexal masses are identified. There are  scattered diverticula in the sigmoid colon but no evidence for diverticulitis. The urinary bladder is moderately filled. Bone window images shows no evidence  for destructive bony lesion. .      Impression IMPRESSION:    Stable appearing trace pleural effusions and pericardial effusions. Surgically absent left kidney and gallbladder. No right renal calculi or obstructive uropathy. Stable small lymph nodes surrounding the aorta. No dilated loops of bowel, free fluid or free air in the peritoneum. Normal-appearing appendix. No evidence for adnexal masses. No pathologic lymphadenopathy. The osseous structures appear intact. .        XR Results (most recent):  Results from Hospital Encounter encounter on 07/31/17   XR CHEST PA LAT    Narrative CHEST PA AND LATERAL     CPT CODE: 50354    HISTORY: Preop partial nephrectomy for left renal mass    COMPARISON: Chest x-ray April 13, 2017. FINDINGS:    PA and lateral views obtained. The cardiac silhouette is minimally enlarged. There is tortuosity of the aorta with calcified plaque. The lungs are slightly  hypoinflated. There is scarring at the lingula without change. Surgical clips  noted in the right axilla. . Pulmonary vascularity is normal. The costophrenic  angles are sharply defined. No bony abnormalities are seen. IMPRESSION:    Stable mild cardiomegaly. Atherosclerosis.         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.7, T45.1X5A    Obstructive sleep apnea syndrome, moderate G47.33    Toxic multinodular goiter E05.20    Malignant neoplasm of left kidney (HCC) C64.2    Chronic kidney disease (CKD) stage G4/A1, severely decreased glomerular filtration rate (GFR) between 15-29 mL/min/1.73 square meter and albuminuria creatinine ratio less than 30 mg/g (HCC) N18.4    Chronic kidney disease (CKD) stage G3a/A1, moderately decreased glomerular filtration rate (GFR) between 45-59 mL/min/1.73 square meter and albuminuria creatinine ratio less than 30 mg/g (HCC) N18.3    Left renal mass N28.89    Chronic systolic congestive heart failure (HCC) I50.22    Multinodular goiter E04.2     1. Iron deficiency anemia   Hb drop noticed by nephrology who referred here for evaluation. Concern for GIB. Start pantoprazol 40 mg PO BID  Referred to GI for evaluation  Injectafer ordered x 2. STAT CBC, if significant drop since last lab will refer to ED for expedite work up and treament. Follow-up and Dispositions    · Return in about 1 week (around 3/22/2019). Stella Vera MD

## 2019-03-22 NOTE — PROGRESS NOTES
HPI     Ash Michelle is a 60 y.o. female with relevant past medical history of right breast cancer s/p surgery (mastectomy and reconstruction) and chemotherapy with associated chemo-induced cardiomyopathy, CHF (EF 50% 2/28/17), HLD, h/o gout, toxic multinodular goiter, CLARENCE, diabetes mellitus type 2- diet controlled, CKD stage IV, h/o renal cancer (HCC) s/p R kidney partial resection (superior pole 3/22/16) and left kidney radical nephrectomy (8/18/17)  who presents today for evaluation of iron deficiency anemia. Hb 3 gr drop on labs done at nephrology office early March compared to November labs, down to 7.4 and low iron levels. Repeat STAT CBC last week showed Hb of 8.3. The patient received first dose of injectafer 3/19/19 with mild nausea afterwards. Reports mild improvement in fatigue, weakness, denies any CP, HA, palpitations or SOB. She has been taking pantoprazol 40 mg PO BID. Has not been scheduled to see GI yet. She has not noticed dark or bloody stools. No other symptoms reported. ROS  As above included in HPI. Otherwise 11 point review of systems negative including constitutional, skin, HENT, eyes, respiratory, cardiovascular, gastrointestinal, genitourinary, musculoskeletal, endocrine, hematologic, allergy, and neurologic.     Past Medical History  Past Medical History:   Diagnosis Date    Cancer [facility]) 2000    Right breast ca     Congestive heart failure, unspecified     Gout     H/O total mastectomy of right breast     Heart disease     Hx: UTI (urinary tract infection)     Hypercholesterolemia     Renal cell carcinoma of left kidney ([facility] [address] 12/17/15    Renal mass, left      Past Surgical History:   Procedure Laterality Date    HX CHOLECYSTECTOMY      HX MASTECTOMY Right     HX NEPHRECTOMY Right 3/22/16    Partial x2, Dr. Sharona Starr, [facility]    HX ORTHOPAEDIC  04/19/2017    Trigger finger release R hand    HX RENAL BIOPSY Right 12/17/15    Ct guided bx, Dr. Elizabet Alcala, [facility]    HX THYROIDECTOMY  6/20/2018        Family History  Family History   Problem Relation Age of Onset    Hypertension Father     Heart Attack Father     Heart Failure Father     Diabetes Father        Social History  She  reports that she has never smoked. She has never used smokeless tobacco.   Social History     Substance and Sexual Activity   Alcohol Use No       Immunization History  Immunization History   Administered Date(s) Administered    Influenza Vaccine (Quad) PF 11/17/2016, 01/26/2018    Tdap 01/26/2018       Allergies  Allergies   Allergen Reactions    Codeine Not Reported This Time, Other (comments) and Swelling     Throat Swelling    Penicillins Angioedema and Other (comments)     N/V, swelling    Sulfa (Sulfonamide Antibiotics) Other (comments)     itching    Sulfur Hives       Medications  Current Outpatient Medications   Medication Sig    pantoprazole (PROTONIX) 40 mg tablet Take 1 Tab by mouth daily.  HYDROcodone-acetaminophen (NORCO) 5-325 mg per tablet Take 1 Tab by Mouth Every 4 Hours As Needed for Pain.  levothyroxine (SYNTHROID) 88 mcg tablet TAKE 1 TABLET BY MOUTH ONCE DAILY    ondansetron (ZOFRAN ODT) 8 mg disintegrating tablet 8 mg.  furosemide (LASIX) 40 mg tablet TAKE 1 TABLET BY MOUTH ONCE A DAY AS NEEDED    lisinopril (PRINIVIL, ZESTRIL) 5 mg tablet Take 10 mg by mouth two (2) times a day.  ascorbic acid (VITAMIN C) 500 mg tablet 500 mg.  cyanocobalamin ER 1,000 mcg tablet Take 1 Tab by Mouth Once a Day.  cholecalciferol (VITAMIN D3) 1,000 unit tablet Take  by mouth daily.  multivitamin (ONE A DAY) tablet Take 1 Tab by mouth daily.  atorvastatin (LIPITOR) 20 mg tablet Take 20 mg by mouth daily.  COREG 25 mg tablet two (2) times a day.  calcium 500 mg tab Take 500 mg by mouth daily. No current facility-administered medications for this visit.       Facility-Administered Medications Ordered in Other Visits   Medication Dose Route Frequency    sodium chloride (NS) flush 10-40 mL  10-40 mL IntraVENous PRN         Visit Vitals  /86 (BP 1 Location: Left arm, BP Patient Position: Sitting)   Pulse 82   Temp 98.3 °F (36.8 °C) (Oral)   Resp 17   Ht 5' 6\" (1.676 m)   Wt 179 lb (81.2 kg)   SpO2 99%   BMI 28.89 kg/m²     Body mass index is 28.89 kg/m². Physical Exam   Constitutional: She is oriented to person, place, and time and well-developed, well-nourished, and in no distress. HENT:   Head: Normocephalic and atraumatic. Eyes: Pupils are equal, round, and reactive to light. Conjunctivae are normal.   Neck: Normal range of motion. Neck supple. Thyromegaly present. Cardiovascular: Normal rate, regular rhythm and normal heart sounds. Pulmonary/Chest: Effort normal and breath sounds normal.   Abdominal: Soft. She exhibits no mass. There is no tenderness (lower abdomen). There is no rebound and no guarding. No CVA tenderness BL   Musculoskeletal: She exhibits no edema. Neurological: She is alert and oriented to person, place, and time. Skin: Skin is warm. Psychiatric: Mood and affect normal.   Nursing note and vitals reviewed. [address] Outpatient Visit on 03/15/2019   Component Date Value Ref Range Status    WBC 03/15/2019 6.6  4.6 - 13.2 K/uL Final    RBC 03/15/2019 3.84* 4.20 - 5.30 M/uL Final    HGB 03/15/2019 8.3* 12.0 - 16.0 g/dL Final    HCT 03/15/2019 28.1* 35.0 - 45.0 % Final    MCV 03/15/2019 73.2* 74.0 - 97.0 FL Final    MCH 03/15/2019 21.6* 24.0 - 34.0 PG Final    MCHC 03/15/2019 29.5* 31.0 - 37.0 g/dL Final    RDW 03/15/2019 18.3* 11.6 - 14.5 % Final    PLATELET 64/69/2002 659  135 - 420 K/uL Final    MPV 03/15/2019 9.6  9.2 - 11.8 FL Final    NEUTROPHILS 03/15/2019 68  40 - 73 % Final    LYMPHOCYTES 03/15/2019 22  21 - 52 % Final    MONOCYTES 03/15/2019 7  3 - 10 % Final    EOSINOPHILS 03/15/2019 2  0 - 5 % Final    BASOPHILS 03/15/2019 1  0 - 2 % Final    ABS.  NEUTROPHILS 03/15/2019 4. 4  1.8 - 8.0 K/UL Final    ABS. LYMPHOCYTES 03/15/2019 1.5  0.9 - 3.6 K/UL Final    ABS. MONOCYTES 03/15/2019 0.5  0.05 - 1.2 K/UL Final    ABS. EOSINOPHILS 03/15/2019 0.1  0.0 - 0.4 K/UL Final    ABS. BASOPHILS 03/15/2019 0.1  0.0 - 0.1 K/UL Final    DF 03/15/2019 AUTOMATED    Final    PLATELET COMMENTS 13/80/4583 ADEQUATE PLATELETS    Final    RBC COMMENTS 03/15/2019     Final                    Value:HYPOCHROMIA  2+      RBC COMMENTS 03/15/2019     Final                    Value:MICROCYTOSIS  2+      RBC COMMENTS 03/15/2019     Final                    Value:ANISOCYTOSIS  2+      RBC COMMENTS 03/15/2019     Final                    Value:TEARDROP CELLS  FEW           CT Results (most recent):  Results from Hospital Encounter encounter on 11/20/18   CT ABD PELV WO CONT    Narrative EXAM: CT scan abdomen and pelvis    CLINICAL HISTORY/INDICATION: Malignant neoplasm of left kidney. Left kidney  surgically absent. .    COMPARISON: CT scan abdomen and pelvis dated March 26, 2018. Noreen Christopher TECHNIQUE: All CT scans at this facility are performed using dose optimization  technique as appropriate to a performed exam, to include automated exposure  control, adjustment of the mA and/or kV according to patient's size (including  appropriate matching for site-specific examinations), or use of iterative  reconstruction technique. Noreen Christopher FINDINGS:    A CT scan of the patient's abdomen and pelvis is performed with multiple axial  images from the dome of the diaphragm to the symphysis. Sagittal and coronal  images are reconstructed. The lung bases show interstitial scarring bilateral  posterior pleural thickening a trace right pleural effusion and a pericardial  effusion. These all appear stable when compared with the previous study. The  attenuation coefficients of the liver, spleen, pancreas and right kidney appear  uniform.  The adrenal glands are seen bilaterally and appear normal. No evidence  for right renal calculi or obstructive uropathy is present. The gallbladder  surgically absent. The aorta is normal in caliber. Noted surrounding the  proximal aorta is the appearance of multiple small lymph nodes all measuring  well less than 1 cm. These are unchanged when compared with the previous study. No dilated loops of bowel, free fluid or free air is seen in the peritoneum. The  appendix is visualized and appears normal. Within the pelvis the uterus appears  normal in size shape and contour. No adnexal masses are identified. There are  scattered diverticula in the sigmoid colon but no evidence for diverticulitis. The urinary bladder is moderately filled. Bone window images shows no evidence  for destructive bony lesion. .      Impression IMPRESSION:    Stable appearing trace pleural effusions and pericardial effusions. Surgically absent left kidney and gallbladder. No right renal calculi or obstructive uropathy. Stable small lymph nodes surrounding the aorta. No dilated loops of bowel, free fluid or free air in the peritoneum. Normal-appearing appendix. No evidence for adnexal masses. No pathologic lymphadenopathy. The osseous structures appear intact. .        XR Results (most recent):  Results from Hospital Encounter encounter on 07/31/17   XR CHEST PA LAT    Narrative CHEST PA AND LATERAL     CPT CODE: 28527    HISTORY: Preop partial nephrectomy for left renal mass    COMPARISON: Chest x-ray April 13, 2017. FINDINGS:    PA and lateral views obtained. The cardiac silhouette is minimally enlarged. There is tortuosity of the aorta with calcified plaque. The lungs are slightly  hypoinflated. There is scarring at the lingula without change. Surgical clips  noted in the right axilla. . Pulmonary vascularity is normal. The costophrenic  angles are sharply defined. No bony abnormalities are seen. IMPRESSION:    Stable mild cardiomegaly. Atherosclerosis.         CT   All Micro Results     None          DIAGNOSIS AND PLAN  Patient Active Problem List   Diagnosis Code    Type 2 diabetes mellitus with renal manifestations ([facility] [address] E11.29    Muscle cramps R25.2    Peripheral edema R60.9    HX: breast cancer Z85.3    Chemotherapy induced cardiomyopathy ([facility] [address] I42.7, T45.1X5A    Obstructive sleep apnea syndrome, moderate G47.33    Toxic multinodular goiter E05.20    Malignant neoplasm of left kidney (HCC) C64.2    Chronic kidney disease (CKD) stage G4/A1, severely decreased glomerular filtration rate (GFR) between 15-29 mL/min/1.73 square meter and albuminuria creatinine ratio less than 30 mg/g (HCC) N18.4    Chronic kidney disease (CKD) stage G3a/A1, moderately decreased glomerular filtration rate (GFR) between 45-59 mL/min/1.73 square meter and albuminuria creatinine ratio less than 30 mg/g (HCC) N18.3    Left renal mass N28.89    Chronic systolic congestive heart failure (HCC) I50.22    Multinodular goiter E04.2     1. Iron deficiency anemia  Hb drop noticed by nephrology who referred here for evaluation. Concern for GIB. C/w pantoprazol 40 mg PO BID  Referred to GI for evaluation- pending evaluation  Injectafer ordered x 2. Had first dose 3/19/19  Repeat CBC, ferritin and iron levels on 4/2/19 after second injectafer dose. Stella Waggoner MD

## 2019-03-26 ENCOUNTER — HOSPITAL ENCOUNTER (OUTPATIENT)
Dept: INFUSION THERAPY | Age: 59
Discharge: HOME | End: 2019-03-26
Payer: COMMERCIAL

## 2019-03-26 VITALS
DIASTOLIC BLOOD PRESSURE: 78 MMHG | SYSTOLIC BLOOD PRESSURE: 122 MMHG | OXYGEN SATURATION: 98 % | TEMPERATURE: 98 F | HEART RATE: 73 BPM | RESPIRATION RATE: 18 BRPM

## 2019-03-26 PROCEDURE — 74011000258 HC RX REV CODE- 258

## 2019-03-26 PROCEDURE — 96374 THER/PROPH/DIAG INJ IV PUSH: CPT

## 2019-03-26 PROCEDURE — 74011250636 HC RX REV CODE- 250/636: Performed by: INTERNAL MEDICINE

## 2019-03-26 RX ORDER — SODIUM CHLORIDE 900 MG/100ML
INJECTION INTRAVENOUS
Status: COMPLETED
Start: 2019-03-26 | End: 2019-03-26

## 2019-03-26 RX ORDER — SODIUM CHLORIDE 0.9 % (FLUSH) 0.9 %
10-40 SYRINGE (ML) INJECTION AS NEEDED
Status: DISCONTINUED | OUTPATIENT
Start: 2019-03-26 | End: 2019-03-30 | Stop reason: HOSPADM

## 2019-03-26 RX ADMIN — FERRIC CARBOXYMALTOSE INJECTION 750 MG: 50 INJECTION, SOLUTION INTRAVENOUS at 15:25

## 2019-03-26 RX ADMIN — Medication 10 ML: at 15:25

## 2019-03-26 RX ADMIN — Medication 10 ML: at 15:45

## 2019-03-26 RX ADMIN — SODIUM CHLORIDE 50 ML: 900 INJECTION INTRAVENOUS at 15:25

## 2019-03-26 NOTE — PROGRESS NOTES
[facility] Progress Note Date: 2019 Name: Augustine Coats MRN: [id number] : 1960 Injectafer Infusion (Left arm use only) Ms. Carcamo to [facility], ambulatory, at [employer]. Pt was assessed and education was provided. Ms. Kimberlee Be vitals were reviewed and patient was observed for 5 minutes prior to treatment. Visit Vitals /78 (BP 1 Location: Left arm, BP Patient Position: Sitting) Pulse 73 Temp 98 °F (36.7 °C) Resp 18 SpO2 98% Left arm use only. 24 g PIV placed in left ac x 1 attempt. PIV flushed easily and had brisk blood return. Injectafer 750 mg was initiated @ 280 ml/hr over 15 minutes IVPB. 3 minutes into infusion, VS stable and pt denied complaints of itching, lip/tongue/facial swelling, SOB, CP or other complaints. Ms. Martine Bueno tolerated the infusion, and had no complaints. VS remained stable. PIV flushed with NS 10 ml and removed. No bleeding or hematoma noted at site. Guaze and coban applied. Reviewed discharge instructions with patient, including expected side effects (abdominal cramping, nausea, changes in color of urine or feces) and signs of allergic reaction requiring medical attention (itching/hives/rashes, SOB, chest pain, lip/tongue/facial swelling). Patient given printed copy to take home. Patient verbalized understanding of discharge instructions. Patient armband removed and shredded. Ms. Martine Bueno was discharged from [place] [address] in stable condition at 1545. She is to follow up with Dr. Betty Story as scheduled. Vanessa Bond RN 
2019 
[id number]

## 2019-04-05 ENCOUNTER — HOSPITAL ENCOUNTER (OUTPATIENT)
Dept: LAB | Age: 59
Discharge: HOME | End: 2019-04-05
Payer: COMMERCIAL

## 2019-04-05 ENCOUNTER — APPOINTMENT (OUTPATIENT)
Dept: INTERNAL MEDICINE CLINIC | Age: 59
End: 2019-04-05

## 2019-04-05 DIAGNOSIS — D50.9 IRON DEFICIENCY ANEMIA, UNSPECIFIED IRON DEFICIENCY ANEMIA TYPE: ICD-10-CM

## 2019-04-05 LAB
BASOPHILS # BLD: 0.1 K/UL (ref 0–0.1)
BASOPHILS NFR BLD: 1 % (ref 0–2)
DIFFERENTIAL METHOD BLD: ABNORMAL
EOSINOPHIL # BLD: 0.2 K/UL (ref 0–0.4)
EOSINOPHIL NFR BLD: 4 % (ref 0–5)
ERYTHROCYTE [DISTWIDTH] IN BLOOD BY AUTOMATED COUNT: ABNORMAL % (ref 11.6–14.5)
FERRITIN SERPL-MCNC: 546 NG/ML (ref 8–388)
HCT VFR BLD AUTO: 36.5 % (ref 35–45)
HGB BLD-MCNC: 10.8 G/DL (ref 12–16)
IRON SATN MFR SERPL: 36 %
IRON SERPL-MCNC: 104 UG/DL (ref 50–175)
LYMPHOCYTES # BLD: 1.3 K/UL (ref 0.9–3.6)
LYMPHOCYTES NFR BLD: 25 % (ref 21–52)
MCH RBC QN AUTO: 25.6 PG (ref 24–34)
MCHC RBC AUTO-ENTMCNC: 29.6 G/DL (ref 31–37)
MCV RBC AUTO: 86.5 FL (ref 74–97)
MONOCYTES # BLD: 0.3 K/UL (ref 0.05–1.2)
MONOCYTES NFR BLD: 6 % (ref 3–10)
NEUTS SEG # BLD: 3.4 K/UL (ref 1.8–8)
NEUTS SEG NFR BLD: 64 % (ref 40–73)
PLATELET # BLD AUTO: 226 K/UL (ref 135–420)
PMV BLD AUTO: 10.2 FL (ref 9.2–11.8)
RBC # BLD AUTO: 4.22 M/UL (ref 4.2–5.3)
TIBC SERPL-MCNC: 289 UG/DL (ref 250–450)
WBC # BLD AUTO: 5.3 K/UL (ref 4.6–13.2)

## 2019-04-05 PROCEDURE — 36415 COLL VENOUS BLD VENIPUNCTURE: CPT

## 2019-04-05 PROCEDURE — 82728 ASSAY OF FERRITIN: CPT

## 2019-04-05 PROCEDURE — 85025 COMPLETE CBC W/AUTO DIFF WBC: CPT

## 2019-04-05 PROCEDURE — 83540 ASSAY OF IRON: CPT

## 2019-04-08 ENCOUNTER — TELEPHONE (OUTPATIENT)
Dept: INTERNAL MEDICINE CLINIC | Age: 59
End: 2019-04-08

## 2019-04-08 DIAGNOSIS — D50.9 IRON DEFICIENCY ANEMIA, UNSPECIFIED IRON DEFICIENCY ANEMIA TYPE: Primary | ICD-10-CM

## 2019-04-08 NOTE — TELEPHONE ENCOUNTER
Patient aware of message below, she verbalized understanding   She asked if she should have it rechecked in about a month or 2?

## 2019-04-08 NOTE — TELEPHONE ENCOUNTER
----- Message from Deysi Garcia MD sent at 4/8/2019  4:57 PM EDT -----  Please let the patient know her Hemoglobin, and iron levels have improved.  Thanks

## 2019-04-09 NOTE — TELEPHONE ENCOUNTER
With her next blood drawn for regular screening labs, either here or with nephrology, whichever comes first. Thanks

## 2019-04-10 NOTE — TELEPHONE ENCOUNTER
I do not see she is scheduled with us in the future, do you want me to schedule her before you leave and have labs done before ?

## 2019-04-10 NOTE — TELEPHONE ENCOUNTER
Yes, let's bring her back in June for follow up. We can do labs one week prior. CBC, iron panel, CMP.  Thanks

## 2019-04-10 NOTE — TELEPHONE ENCOUNTER
Pt aware  Appt scheduled 5/30 for labs and appt scheduled for follow up a week after  Labs entered- ferritin, iron profile and CBC

## 2019-05-29 ENCOUNTER — TELEPHONE (OUTPATIENT)
Dept: INTERNAL MEDICINE CLINIC | Age: 59
End: 2019-05-29

## 2019-05-29 ENCOUNTER — HOSPITAL ENCOUNTER (EMERGENCY)
Age: 59
Discharge: HOME | End: 2019-05-29
Attending: EMERGENCY MEDICINE
Payer: COMMERCIAL

## 2019-05-29 VITALS
HEIGHT: 66 IN | BODY MASS INDEX: 25.71 KG/M2 | SYSTOLIC BLOOD PRESSURE: 134 MMHG | DIASTOLIC BLOOD PRESSURE: 87 MMHG | HEART RATE: 94 BPM | RESPIRATION RATE: 18 BRPM | OXYGEN SATURATION: 98 % | TEMPERATURE: 97.8 F | WEIGHT: 160 LBS

## 2019-05-29 DIAGNOSIS — K62.5 RECTAL BLEEDING: Primary | ICD-10-CM

## 2019-05-29 LAB
ANION GAP SERPL CALC-SCNC: 8 MMOL/L (ref 3–18)
BASOPHILS # BLD: 0.1 K/UL (ref 0–0.1)
BASOPHILS NFR BLD: 1 % (ref 0–2)
BUN SERPL-MCNC: 30 MG/DL (ref 7–18)
BUN/CREAT SERPL: 12 (ref 12–20)
CALCIUM SERPL-MCNC: 8.7 MG/DL (ref 8.5–10.1)
CHLORIDE SERPL-SCNC: 110 MMOL/L (ref 100–108)
CO2 SERPL-SCNC: 24 MMOL/L (ref 21–32)
CREAT SERPL-MCNC: 2.47 MG/DL (ref 0.6–1.3)
DIFFERENTIAL METHOD BLD: ABNORMAL
EOSINOPHIL # BLD: 0.1 K/UL (ref 0–0.4)
EOSINOPHIL NFR BLD: 2 % (ref 0–5)
ERYTHROCYTE [DISTWIDTH] IN BLOOD BY AUTOMATED COUNT: 19.8 % (ref 11.6–14.5)
GLUCOSE SERPL-MCNC: 130 MG/DL (ref 74–99)
HCT VFR BLD AUTO: 42.4 % (ref 35–45)
HEMOCCULT STL QL: NEGATIVE
HGB BLD-MCNC: 13.8 G/DL (ref 12–16)
LYMPHOCYTES # BLD: 1.6 K/UL (ref 0.9–3.6)
LYMPHOCYTES NFR BLD: 24 % (ref 21–52)
MCH RBC QN AUTO: 29.6 PG (ref 24–34)
MCHC RBC AUTO-ENTMCNC: 32.5 G/DL (ref 31–37)
MCV RBC AUTO: 90.8 FL (ref 74–97)
MONOCYTES # BLD: 0.5 K/UL (ref 0.05–1.2)
MONOCYTES NFR BLD: 8 % (ref 3–10)
NEUTS SEG # BLD: 4.4 K/UL (ref 1.8–8)
NEUTS SEG NFR BLD: 65 % (ref 40–73)
PLATELET # BLD AUTO: 241 K/UL (ref 135–420)
PMV BLD AUTO: 10.1 FL (ref 9.2–11.8)
POTASSIUM SERPL-SCNC: 4.2 MMOL/L (ref 3.5–5.5)
RBC # BLD AUTO: 4.67 M/UL (ref 4.2–5.3)
SODIUM SERPL-SCNC: 142 MMOL/L (ref 136–145)
WBC # BLD AUTO: 6.7 K/UL (ref 4.6–13.2)

## 2019-05-29 PROCEDURE — 82270 OCCULT BLOOD FECES: CPT

## 2019-05-29 PROCEDURE — 99282 EMERGENCY DEPT VISIT SF MDM: CPT

## 2019-05-29 PROCEDURE — 85025 COMPLETE CBC W/AUTO DIFF WBC: CPT

## 2019-05-29 PROCEDURE — 80048 BASIC METABOLIC PNL TOTAL CA: CPT

## 2019-05-29 NOTE — DISCHARGE INSTRUCTIONS
Patient Education        Colonoscopy: Before Your Procedure  What is a colonoscopy? A colonoscopy is a test that lets a doctor look inside your colon. The doctor uses a thin, lighted tube called a colonoscope to look for problems. These include small growths called polyps, cancer, or bleeding. During the test, the doctor can take samples of tissue that can be checked for cancer or other problems. This is called a biopsy. The doctor can also take out polyps. Before the test, you will need to stop eating solid foods. You also will drink a liquid or take a tablet that cleans out your colon. This helps your doctor be able to see inside your colon during the test.  Follow-up care is a key part of your treatment and safety. Be sure to make and go to all appointments, and call your doctor if you are having problems. It's also a good idea to know your test results and keep a list of the medicines you take. What happens before the procedure?   Preparing for the procedure    · Understand exactly what procedure is planned, along with the risks, benefits, and other options. · Tell your doctors ALL the medicines, vitamins, supplements, and herbal remedies you take. Some of these can increase the risk of bleeding or interact with anesthesia.     · If you take blood thinners, such as warfarin (Coumadin), clopidogrel (Plavix), or aspirin, be sure to talk to your doctor. He or she will tell you if you should stop taking these medicines before your procedure. Make sure that you understand exactly what your doctor wants you to do.     · Your doctor will tell you which medicines to take or stop before your procedure. You may need to stop taking certain medicines a week or more before the procedure. So talk to your doctor as soon as you can.     · If you have an advance directive, let your doctor know. It may include a living will and a durable power of  for health care.  Bring a copy to the hospital. If you don't have one, you may want to prepare one. It lets your doctor and loved ones know your health care wishes. Doctors advise that everyone prepare these papers before any type of surgery or procedure.    Before the procedure    · Follow your doctor's directions about when to stop eating solid foods and drink only clear liquids. You can drink water, clear juices, clear broths, flavored ice pops, and gelatin (such as Jell-O). Do not eat or drink anything red or purple. This includes grape juice and grape-flavored ice pops. It also includes fruit punch and cherry gelatin.     · Drink the \"colon prep\" liquid as your doctor tells you. You will want to stay home, because the liquid will make you go to the bathroom a lot. Your stools will be loose and watery. It is very important to drink all of the liquid. If you have problems drinking it, call your doctor. Some doctors may have you take a tablet rather than drink a liquid.     · Do not eat any solid foods after you drink the colon prep.     · Stop drinking clear liquids 6 to 8 hours before the test.   Procedures can be stressful. This information will help you understand what you can expect. And it will help you safely prepare for your procedure. What happens on the day of the procedure? · Follow the instructions exactly about when to stop eating and drinking. If you don't, your procedure may be canceled. If your doctor told you to take your medicines on the day of the procedure, take them with only a sip of water.     · Take a bath or shower before you come in for your procedure. Do not apply lotions, perfumes, deodorants, or nail polish.     · Take off all jewelry and piercings. And take out contact lenses, if you wear them.    At the doctor's office or hospital   · Bring a picture ID.     · You will be kept comfortable and safe by your anesthesia provider.  The anesthesia may make you sleep.     · You will lie on your back or your side with your knees drawn up toward your belly. The doctor will gently put a gloved finger into your anus. Then the doctor puts the scope in and moves it into your colon. The scope goes in easily because it is lubricated.     · The doctor may also use small tools to take tissue samples for a biopsy or to remove polyps. This does not hurt.     · The test usually takes 30 to 45 minutes. But it may take longer. It depends on what is found and what is done. Going home   · Be sure you have someone to drive you home. Anesthesia and pain medicine make it unsafe for you to drive.     · You will be given more specific instructions about recovering from your procedure. When should you call your doctor? · You have questions or concerns.     · You don't understand how to prepare for your procedure.     · You are having trouble with the bowel prep.     · You become ill before the procedure (such as fever, flu, or a cold).     · You need to reschedule or have changed your mind about having the procedure. Where can you learn more? Go to http://birdie-juany.info/. Enter C315 in the search box to learn more about \"Colonoscopy: Before Your Procedure. \"  Current as of: March 27, 2018  Content Version: 11.9  © 7373-2121 [employer]. Care instructions adapted under license by [employer] (which disclaims liability or warranty for this information). If you have questions about a medical condition or this instruction, always ask your healthcare professional. [place] [address] any warranty or liability for your use of this information. Patient Education        Rectal Bleeding: Care Instructions  Your Care Instructions    Rectal bleeding in small amounts is common. You may see red spotting on toilet paper or drops of blood in the toilet. Rectal bleeding has many possible causes, from something as minor as hemorrhoids to something as serious as colon cancer.  You may need more tests to find the cause of your bleeding. Follow-up care is a key part of your treatment and safety. Be sure to make and go to all appointments, and call your doctor if you are having problems. It's also a good idea to know your test results and keep a list of the medicines you take. How can you care for yourself at home? · Avoid aspirin and other nonsteroidal anti-inflammatory drugs (NSAIDs), such as ibuprofen (Advil, Motrin) and naproxen (Aleve). They can cause you to bleed more. Ask your doctor if you can take acetaminophen (Tylenol). Read and follow all instructions on the label. · Use a stool softener that contains bran or psyllium. You can save money by buying bran or psyllium (available in bulk at most health food stores) and sprinkling it on foods or stirring it into fruit juice. You can also use a product such as Metamucil or Citrucel. · Take your medicines exactly as directed. Call your doctor if you think you are having a problem with your medicine. When should you call for help? Call 911 anytime you think you may need emergency care. For example, call if:    · You passed out (lost consciousness).    Call your doctor now or seek immediate medical care if:    · You have new or worse pain.     · You have new or worse bleeding from the rectum.     · You are dizzy or light-headed, or you feel like you may faint.    Watch closely for changes in your health, and be sure to contact your doctor if:    · You cannot pass stools or gas.     · You do not get better as expected. Where can you learn more? Go to http://birdie-juany.info/. Enter E914 in the search box to learn more about \"Rectal Bleeding: Care Instructions. \"  Current as of: March 27, 2018  Content Version: 11.9  © 8558-9608 [employer]. Care instructions adapted under license by [employer] (which disclaims liability or warranty for this information).  If you have questions about a medical condition or this instruction, always ask your healthcare professional. [place] [address] any warranty or liability for your use of this information.

## 2019-05-29 NOTE — ED PROVIDER NOTES
HPI patient states she had an episode of bright red blood with a bowel movement today. She had no pain with this bowel movement. She called her PCP who referred her to the emergency room for further evaluation. Patient states she has a past history of anemia and is been treated for the same recently by her PCP. Denies any nausea vomiting or abdominal pain. Denies any past history of GI bleeding or history of ulcers. No other symptoms or complaints at this time. Past Medical History:   Diagnosis Date    Cancer [facility]) 2000    Right breast ca     Congestive heart failure, unspecified     Gout     H/O total mastectomy of right breast     Heart disease     Hx: UTI (urinary tract infection)     Hypercholesterolemia     Iron deficiency anemia     Renal cell carcinoma of left kidney ([facility] [address] 12/17/15    Renal mass, left        Past Surgical History:   Procedure Laterality Date    HX CHOLECYSTECTOMY      HX MASTECTOMY Right     HX NEPHRECTOMY Right 3/22/16    Partial x2, Dr. Angelica Waller, [facility]    HX ORTHOPAEDIC  04/19/2017    Trigger finger release R hand    HX RENAL BIOPSY Right 12/17/15    Ct guided bx, Dr. Jewell Gibbons, [facility]    HX THYROIDECTOMY  6/20/2018         Family History:   Problem Relation Age of Onset    Hypertension Father     Heart Attack Father     Heart Failure Father     Diabetes Father        Social History     Socioeconomic History    Marital status:      Spouse name: Not on file    Number of children: Not on file    Years of education: Not on file    Highest education level: Not on file   Occupational History    Not on file   Social Needs    Financial resource strain: Not on file    Food insecurity:     Worry: Not on file     Inability: Not on file    Transportation needs:     Medical: Not on file     Non-medical: Not on file   Tobacco Use    Smoking status: Never Smoker    Smokeless tobacco: Never Used   Substance and Sexual Activity    Alcohol use: No    Drug use:  No  Sexual activity: Not on file   Lifestyle    Physical activity:     Days per week: Not on file     Minutes per session: Not on file    Stress: Not on file   Relationships    Social connections:     Talks on phone: Not on file     Gets together: Not on file     Attends Taoism service: Not on file     Active member of club or organization: Not on file     Attends meetings of clubs or organizations: Not on file     Relationship status: Not on file    Intimate partner violence:     Fear of current or ex partner: Not on file     Emotionally abused: Not on file     Physically abused: Not on file     Forced sexual activity: Not on file   Other Topics Concern    Not on file   Social History Narrative    Not on file         ALLERGIES: Codeine; Penicillins; Sulfa (sulfonamide antibiotics); and Sulfur    Review of Systems   Constitutional: Negative. HENT: Negative. Eyes: Negative. Respiratory: Negative. Cardiovascular: Negative. Gastrointestinal: Negative. Genitourinary: Negative. Musculoskeletal: Negative. Neurological: Negative. Hematological: Negative. Psychiatric/Behavioral: Negative. Vitals:    05/29/19 1224   BP: 134/87   Pulse: 94   Resp: 18   Temp: 97.8 °F (36.6 °C)   SpO2: 98%   Weight: 72.6 kg (160 lb)   Height: 5' 6\" (1.676 m)            Physical Exam   Constitutional: She is oriented to person, place, and time. She appears well-developed. HENT:   Head: Normocephalic and atraumatic. Mouth/Throat: Oropharynx is clear and moist.   Eyes: Pupils are equal, round, and reactive to light. Conjunctivae and EOM are normal.   Neck: Normal range of motion. Neck supple. Cardiovascular: Normal rate and regular rhythm. Pulmonary/Chest: Effort normal and breath sounds normal.   Abdominal: Soft. Genitourinary: Rectal exam shows guaiac negative stool. Genitourinary Comments: Normal rectal tone with brown stool in the vault that is heme negative.   No hemorrhoids no blood noted.   Musculoskeletal: Normal range of motion. Neurological: She is alert and oriented to person, place, and time. Skin: Skin is warm and dry. Psychiatric: She has a normal mood and affect. Nursing note and vitals reviewed. MDM    I have reviewed the lab results and her work-up results with the patient. She has a GI follow-up in 2 weeks. She will keep that appointment and agrees to return to the emergency room or follow-up with her PCP if symptoms change worsen.   Izaiah Donis MD 2:08 PM       Procedures

## 2019-05-29 NOTE — ED NOTES
Laurie Painter is a 60 y.o. female that was discharged in good condition. The patients diagnosis, condition and treatment were explained to  patient and aftercare instructions were given. The patient verbalized understanding. Patient armband removed and shredded. Pt declined discharge vital signs.

## 2019-05-29 NOTE — ED TRIAGE NOTES
Patient states onset of rectal bleeding yesterday. States hx of iron deficiency anemia. Advised by PCP to report to ER to r/o lower GI bleed.

## 2019-05-29 NOTE — TELEPHONE ENCOUNTER
Please refer her to the ED, she has h/o iron def anemia, I am concerned about GI bleed. Please let the patient know.  Natividad

## 2019-06-05 ENCOUNTER — HOSPITAL ENCOUNTER (OUTPATIENT)
Dept: CT IMAGING | Age: 59
Discharge: HOME | End: 2019-06-05
Attending: UROLOGY
Payer: COMMERCIAL

## 2019-06-05 ENCOUNTER — HOSPITAL ENCOUNTER (OUTPATIENT)
Dept: ULTRASOUND IMAGING | Age: 59
Discharge: HOME | End: 2019-06-05
Attending: UROLOGY
Payer: COMMERCIAL

## 2019-06-05 DIAGNOSIS — C64.2 MALIGNANT NEOPLASM OF LEFT KIDNEY (HCC): ICD-10-CM

## 2019-06-05 PROCEDURE — 74176 CT ABD & PELVIS W/O CONTRAST: CPT

## 2019-06-05 PROCEDURE — 76770 US EXAM ABDO BACK WALL COMP: CPT

## 2019-06-06 ENCOUNTER — OFFICE VISIT (OUTPATIENT)
Dept: INTERNAL MEDICINE CLINIC | Age: 59
End: 2019-06-06

## 2019-06-06 DIAGNOSIS — Z85.3 HX: BREAST CANCER: ICD-10-CM

## 2019-06-06 DIAGNOSIS — K92.1 HEMATOCHEZIA: ICD-10-CM

## 2019-06-06 DIAGNOSIS — N18.4 CONTROLLED TYPE 2 DIABETES MELLITUS WITH STAGE 4 CHRONIC KIDNEY DISEASE, WITHOUT LONG-TERM CURRENT USE OF INSULIN (HCC): ICD-10-CM

## 2019-06-06 DIAGNOSIS — Z00.00 WELLNESS EXAMINATION: Primary | ICD-10-CM

## 2019-06-06 DIAGNOSIS — D50.9 IRON DEFICIENCY ANEMIA, UNSPECIFIED IRON DEFICIENCY ANEMIA TYPE: ICD-10-CM

## 2019-06-06 DIAGNOSIS — E11.22 CONTROLLED TYPE 2 DIABETES MELLITUS WITH STAGE 4 CHRONIC KIDNEY DISEASE, WITHOUT LONG-TERM CURRENT USE OF INSULIN (HCC): ICD-10-CM

## 2019-06-06 DIAGNOSIS — E78.5 HYPERLIPIDEMIA, UNSPECIFIED HYPERLIPIDEMIA TYPE: ICD-10-CM

## 2019-06-06 RX ORDER — ATORVASTATIN CALCIUM 20 MG/1
20 TABLET, FILM COATED ORAL DAILY
Qty: 90 TAB | Refills: 3 | Status: SHIPPED | OUTPATIENT
Start: 2019-06-06 | End: 2021-02-04 | Stop reason: SDUPTHER

## 2019-06-06 NOTE — PROGRESS NOTES
JAYLAN Singh is a 60 y.o. female with relevant past medical history of right breast cancer s/p surgery (mastectomy and reconstruction) and chemotherapy with associated chemo-induced cardiomyopathy, CHF (EF 50% 2/28/17), HLD, h/o gout, toxic multinodular goiter, CLARENCE, diabetes mellitus type 2- diet controlled, CKD stage IV, h/o renal cancer (HCC) s/p R kidney partial resection (superior pole 3/22/16) and left kidney radical nephrectomy (8/18/17)  who presents today for follow up. Recent Hb 3 gr drop on labs done at nephrology office in early March compared to November labs, down to 7.4 and low iron levels. Treated with injectafer 3/19/19 which helped her anemia. She persists with BRBPR mixed with stool for the past couple of days, not associated with pain on defecation, rectal mass or pain, abdominal discomfort, hearburn, N/V, diarrhea or constipation. On 5/29/19 she was seen in the ED for this problem, her rectal exam in the ED was unremarkable, with on blood noticed, neg FOBT and no hemorrhoids noticed. She denies any fatigue, weakness, CP, HA, palpitations or SOB. CBC showed H/h 13/42 in the ED on 5/29/19 from H/H 10/36 on 4/5/19. Her BP and HR were normal and the patient was discharged to follow up with GI in about 2 weeks from her ED visit. She has an retroperioneal ultrasound and CT A/P ordered by oncology which she will see next week, done yesterday on 6/5/19:   CT A/P   Tiny amount of free fluid. Small bilateral pleural effusions. Small pericardial effusion possibly minimally increased. Stable splenic 1 cm nonspecific hypodensity   Stable subcentimeter right renal subcortical upper pole hypodensity too small to characterize. Stable postsurgical changes involving the lower pole of the right kidney. Diverticulosis coli without evidence of diverticulitis. Retroperioneal US:  1.  Small right renal cysts. Simple sonographic features. Similar to 11/20/18.   2.  Poorly evaluated bladder due to contracted state. For bladder evaluation,CT may be considered. 3.  Splenic cyst.    She has been taking pantoprazol 40 mg PO daily,  She feels well otherwise. ROS  As above included in HPI. Otherwise 11 point review of systems negative including constitutional, skin, HENT, eyes, respiratory, cardiovascular, gastrointestinal, genitourinary, musculoskeletal, endocrine, hematologic, allergy, and neurologic. Past Medical History  Past Medical History:   Diagnosis Date    Cancer [facility]) 2000    Right breast ca     Congestive heart failure, unspecified     Gout     H/O total mastectomy of right breast     Heart disease     Hx: UTI (urinary tract infection)     Hypercholesterolemia     Iron deficiency anemia     Renal cell carcinoma of left kidney ([facility] [address] 12/17/15    Renal mass, left      Past Surgical History:   Procedure Laterality Date    HX CHOLECYSTECTOMY      HX MASTECTOMY Right     HX NEPHRECTOMY Right 3/22/16    Partial x2, Dr. Merly Maldonado, [facility]    HX ORTHOPAEDIC  04/19/2017    Trigger finger release R hand    HX RENAL BIOPSY Right 12/17/15    Ct guided bx, Dr. Aric Ragsdale, [facility]    HX THYROIDECTOMY  6/20/2018        Family History  Family History   Problem Relation Age of Onset    Hypertension Father     Heart Attack Father     Heart Failure Father     Diabetes Father        Social History  She  reports that she has never smoked.  She has never used smokeless tobacco.   Social History     Substance and Sexual Activity   Alcohol Use No       Immunization History  Immunization History   Administered Date(s) Administered    Influenza Vaccine (Quad) PF 11/17/2016, 01/26/2018    Tdap 01/26/2018       Allergies  Allergies   Allergen Reactions    Codeine Not Reported This Time, Other (comments) and Swelling     Throat Swelling    Penicillins Angioedema and Other (comments)     N/V, swelling    Sulfa (Sulfonamide Antibiotics) Other (comments)     itching    Sulfur Hives Medications  Current Outpatient Medications   Medication Sig    atorvastatin (LIPITOR) 20 mg tablet Take 1 Tab by mouth daily.  pantoprazole (PROTONIX) 40 mg tablet Take 1 Tab by mouth daily.  levothyroxine (SYNTHROID) 88 mcg tablet TAKE 1 TABLET BY MOUTH ONCE DAILY    ondansetron (ZOFRAN ODT) 8 mg disintegrating tablet 8 mg.  furosemide (LASIX) 40 mg tablet TAKE 1 TABLET BY MOUTH ONCE A DAY AS NEEDED    lisinopril (PRINIVIL, ZESTRIL) 5 mg tablet Take 10 mg by mouth two (2) times a day.  ascorbic acid (VITAMIN C) 500 mg tablet 500 mg.  cholecalciferol (VITAMIN D3) 1,000 unit tablet Take  by mouth daily.  COREG 25 mg tablet two (2) times a day.  calcium 500 mg tab Take 500 mg by mouth daily. No current facility-administered medications for this visit. There were no vitals taken for this visit. There is no height or weight on file to calculate BMI. Physical Exam   Constitutional: She is oriented to person, place, and time and well-developed, well-nourished, and in no distress. HENT:   Head: Normocephalic and atraumatic. Eyes: Pupils are equal, round, and reactive to light. Conjunctivae are normal.   Neck: Normal range of motion. Neck supple. Thyromegaly present. Cardiovascular: Normal rate, regular rhythm and normal heart sounds. Pulmonary/Chest: Effort normal and breath sounds normal.   Abdominal: Soft. She exhibits no mass. There is no tenderness (lower abdomen). There is no rebound and no guarding. No CVA tenderness BL   Musculoskeletal: She exhibits no edema. Neurological: She is alert and oriented to person, place, and time. Skin: Skin is warm. Psychiatric: Mood and affect normal.   Nursing note and vitals reviewed.         REVIEW OF DATA    Labs  Admission on 05/29/2019, Discharged on 05/29/2019   Component Date Value Ref Range Status    WBC 05/29/2019 6.7  4.6 - 13.2 K/uL Final    RBC 05/29/2019 4.67  4.20 - 5.30 M/uL Final    HGB 05/29/2019 13.8 12.0 - 16.0 g/dL Final    HCT 05/29/2019 42.4  35.0 - 45.0 % Final    MCV 05/29/2019 90.8  74.0 - 97.0 FL Final    MCH 05/29/2019 29.6  24.0 - 34.0 PG Final    MCHC 05/29/2019 32.5  31.0 - 37.0 g/dL Final    RDW 05/29/2019 19.8* 11.6 - 14.5 % Final    PLATELET 22/96/6780 646  135 - 420 K/uL Final    MPV 05/29/2019 10.1  9.2 - 11.8 FL Final    NEUTROPHILS 05/29/2019 65  40 - 73 % Final    LYMPHOCYTES 05/29/2019 24  21 - 52 % Final    MONOCYTES 05/29/2019 8  3 - 10 % Final    EOSINOPHILS 05/29/2019 2  0 - 5 % Final    BASOPHILS 05/29/2019 1  0 - 2 % Final    ABS. NEUTROPHILS 05/29/2019 4.4  1.8 - 8.0 K/UL Final    ABS. LYMPHOCYTES 05/29/2019 1.6  0.9 - 3.6 K/UL Final    ABS. MONOCYTES 05/29/2019 0.5  0.05 - 1.2 K/UL Final    ABS. EOSINOPHILS 05/29/2019 0.1  0.0 - 0.4 K/UL Final    ABS. BASOPHILS 05/29/2019 0.1  0.0 - 0.1 K/UL Final    DF 05/29/2019 AUTOMATED    Final    Sodium 05/29/2019 142  136 - 145 mmol/L Final    Potassium 05/29/2019 4.2  3.5 - 5.5 mmol/L Final    Chloride 05/29/2019 110* 100 - 108 mmol/L Final    CO2 05/29/2019 24  21 - 32 mmol/L Final    Anion gap 05/29/2019 8  3.0 - 18 mmol/L Final    Glucose 05/29/2019 130* 74 - 99 mg/dL Final    BUN 05/29/2019 30* 7.0 - 18 MG/DL Final    Creatinine 05/29/2019 2.47* 0.6 - 1.3 MG/DL Final    BUN/Creatinine ratio 05/29/2019 12  12 - 20   Final    GFR est AA 05/29/2019 24* >60 ml/min/1.73m2 Final    GFR est non-AA 05/29/2019 20* >60 ml/min/1.73m2 Final    Calcium 05/29/2019 8.7  8.5 - 10.1 MG/DL Final    Occult blood, stool (POC) 05/29/2019 NEGATIVE   NEG   Final         CT Results (most recent):  Results from [place] encounter on 06/05/19   CT ABD PELV WO CONT    Narrative EXAM: CT ABDOMEN  WITHOUT CONTRAST    CLINICAL HISTORY/INDICATION:  Malignant neoplasm of the left kidney treated with  nephrectomy. Prior partial resection of right kidney    COMPARISON: ultrasound retroperitoneum 11/20/2018.  CT abdomen and pelvis  11/20/2018    TECHNIQUE: No intravenous contrast was utilized for this helical thin section CT  of the abdomen. Coronal and sagittal reformations obtained. Evaluation of the  solid organs is limited by the lack of IV contrast.    All CT scans at this facility are performed using dose optimization technique as  appropriate to a performed exam, to include automated exposure control,  adjustment of the mA and/or kV according to patient's size (including  appropriate matching for site-specific examinations), or use of iterative  reconstruction technique. FINDINGS:     Slight interval increase in small pericardial effusion currently measuring 1 cm  adjacent to the left ventricle previously measuring 8 mm. Stable cardiomegaly. Trace bilateral dependent pleural effusions. The included portion of the chest wall and abdominal wall  is unremarkable    The liver and spleen are normal in size. 1 cm hypodense mass in the inferior  medial spleen similar to the previous exam. Image 23 series 3.. The biliary tree  is not dilated. The gallbladder is surgically removed. Clips at the jose  hepatis. Prior left nephrectomy. Bowel loops are seen within the left renal bed. Postsurgical changes involving the lower pole of the right kidney. Surgical  clips. Subtle upper to midpole 7 mm subcortical hypodensity not really appreciated on  the axial images. See coronal image 43 series 601. Pinedajane Navdeep demonstrated sagittal  image 24. No calculi are identified in the kidney or ureter. Adrenals and Pancreas  are of normal CT appearance. Tiny amount of free fluid in the pelvis. Uterus and ovaries unremarkable. .    Scattered diverticula of the colon. No surrounding inflammation. Tiny umbilical hernia contains only fat. Review of osseous structures throughout on bone window settings shows no  significant osseous pathology. Impression IMPRESSION:     Tiny amount of free fluid.   Small bilateral pleural effusions. Small pericardial effusion possibly minimally increased. Stable splenic 1 cm nonspecific hypodensity   Stable subcentimeter right renal subcortical upper pole hypodensity too small to  characterize. Stable postsurgical changes involving the lower pole of the right kidney. Diverticulosis coli without evidence of diverticulitis. XR Results (most recent):  Results from Hospital Encounter encounter on 07/31/17   XR CHEST PA LAT    Narrative CHEST PA AND LATERAL     CPT CODE: 74072    HISTORY: Preop partial nephrectomy for left renal mass    COMPARISON: Chest x-ray April 13, 2017. FINDINGS:    PA and lateral views obtained. The cardiac silhouette is minimally enlarged. There is tortuosity of the aorta with calcified plaque. The lungs are slightly  hypoinflated. There is scarring at the lingula without change. Surgical clips  noted in the right axilla. . Pulmonary vascularity is normal. The costophrenic  angles are sharply defined. No bony abnormalities are seen. IMPRESSION:    Stable mild cardiomegaly. Atherosclerosis.         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.7, T45.1X5A    Obstructive sleep apnea syndrome, moderate G47.33    Toxic multinodular goiter E05.20    Malignant neoplasm of left kidney (HCC) C64.2    Chronic kidney disease (CKD) stage G4/A1, severely decreased glomerular filtration rate (GFR) between 15-29 mL/min/1.73 square meter and albuminuria creatinine ratio less than 30 mg/g (HCC) N18.4    Chronic kidney disease (CKD) stage G3a/A1, moderately decreased glomerular filtration rate (GFR) between 45-59 mL/min/1.73 square meter and albuminuria creatinine ratio less than 30 mg/g (HCC) N18.3    Left renal mass N28.89    Chronic systolic congestive heart failure (HCC) I50.22    Multinodular goiter E04.2     1.hematochezia  2. Iron deficiency anemia  Concern for GIB. C/w pantoprazol 40 mg PO every day  Repeat CBC tomorrow, Hemodynamically stable and asymptomatic  Follow up with GI in a few days, consider upper and lower endoscopy for work up of GIB, no apparent hemorrhoids on recent rectal exam, CT A/P no gross findings to explain patient's symptoms. 3. Wellness evaluation  4. DM2 with CKD stage 4  5. H/o breast CA  6. H/o kidney cancer  Due for labs including HbA1c, microalbuminuria, lipid panel- ordered  Mammogram and pap smear, also ordered. She will have labs done tomorrow at [facility] ordered by nephrology, order given to obtain labs/tests above as well. She is also recommended to have eye retinal exam or dilated, and a referral to optometry is placed. Follow up with oncology some free fluid in abdomen, pericardium and pleural effusion, asymptomatic, no rub on cardiac exam, lung auscultation clear today, unsure of significance, will defer to oncology, follow up in a few days. 7. HLD  C/w statin, check lipids fasting in AM      Follow-up and Dispositions    · Return in about 3 months (around 9/6/2019). Stella Menendez MD

## 2019-06-09 LAB
ALBUMIN SERPL-MCNC: 4.2 G/DL (ref 3.6–4.8)
ALBUMIN/GLOB SERPL: 2.1 {RATIO} (ref 1.2–2.2)
ALP SERPL-CCNC: 250 IU/L (ref 39–117)
ALT SERPL-CCNC: 25 IU/L (ref 0–32)
APPEARANCE UR: CLEAR
AST SERPL-CCNC: 30 IU/L (ref 0–40)
BACTERIA #/AREA URNS HPF: ABNORMAL /[HPF]
BASOPHILS # BLD AUTO: 0.1 X10E3/UL (ref 0–0.2)
BASOPHILS NFR BLD AUTO: 1 %
BILIRUB SERPL-MCNC: 0.4 MG/DL (ref 0–1.2)
BILIRUB UR QL STRIP: NEGATIVE
BUN SERPL-MCNC: 24 MG/DL (ref 8–27)
BUN/CREAT SERPL: 12 (ref 12–28)
CALCIUM SERPL-MCNC: 9.1 MG/DL (ref 8.7–10.3)
CASTS URNS QL MICRO: ABNORMAL /LPF
CHLORIDE SERPL-SCNC: 109 MMOL/L (ref 96–106)
CHOLEST SERPL-MCNC: 181 MG/DL (ref 100–199)
CO2 SERPL-SCNC: 21 MMOL/L (ref 20–29)
COLOR UR: YELLOW
CREAT SERPL-MCNC: 2.05 MG/DL (ref 0.57–1)
EOSINOPHIL # BLD AUTO: 0.1 X10E3/UL (ref 0–0.4)
EOSINOPHIL NFR BLD AUTO: 2 %
EPI CELLS #/AREA URNS HPF: ABNORMAL /HPF (ref 0–10)
ERYTHROCYTE [DISTWIDTH] IN BLOOD BY AUTOMATED COUNT: 18.7 % (ref 12.3–15.4)
FERRITIN SERPL-MCNC: 177 NG/ML (ref 15–150)
GLOBULIN SER CALC-MCNC: 2 G/DL (ref 1.5–4.5)
GLUCOSE SERPL-MCNC: 95 MG/DL (ref 65–99)
GLUCOSE UR QL: NEGATIVE
HBA1C MFR BLD: 5.7 % (ref 4.8–5.6)
HCT VFR BLD AUTO: 41 % (ref 34–46.6)
HDLC SERPL-MCNC: 34 MG/DL
HGB BLD-MCNC: 12.7 G/DL (ref 11.1–15.9)
HGB UR QL STRIP: NEGATIVE
IMM GRANULOCYTES # BLD AUTO: 0 X10E3/UL (ref 0–0.1)
IMM GRANULOCYTES NFR BLD AUTO: 0 %
INTERPRETATION, 910389: NORMAL
INTERPRETATION: NORMAL
IRON SATN MFR SERPL: 26 % (ref 15–55)
IRON SERPL-MCNC: 60 UG/DL (ref 27–159)
KETONES UR QL STRIP: NEGATIVE
LDLC SERPL CALC-MCNC: 122 MG/DL (ref 0–99)
LEUKOCYTE ESTERASE UR QL STRIP: ABNORMAL
LYMPHOCYTES # BLD AUTO: 1.8 X10E3/UL (ref 0.7–3.1)
LYMPHOCYTES NFR BLD AUTO: 31 %
MCH RBC QN AUTO: 29.1 PG (ref 26.6–33)
MCHC RBC AUTO-ENTMCNC: 31 G/DL (ref 31.5–35.7)
MCV RBC AUTO: 94 FL (ref 79–97)
MICRO URNS: ABNORMAL
MICROALBUMIN UR-MCNC: 18.9 UG/ML
MONOCYTES # BLD AUTO: 0.3 X10E3/UL (ref 0.1–0.9)
MONOCYTES NFR BLD AUTO: 5 %
MUCOUS THREADS URNS QL MICRO: PRESENT
NEUTROPHILS # BLD AUTO: 3.5 X10E3/UL (ref 1.4–7)
NEUTROPHILS NFR BLD AUTO: 61 %
NITRITE UR QL STRIP: NEGATIVE
PDF IMAGE, 910387: NORMAL
PH UR STRIP: 5.5 [PH] (ref 5–7.5)
PLATELET # BLD AUTO: 258 X10E3/UL (ref 150–450)
POTASSIUM SERPL-SCNC: 4.5 MMOL/L (ref 3.5–5.2)
PROT SERPL-MCNC: 6.2 G/DL (ref 6–8.5)
PROT UR QL STRIP: NEGATIVE
RBC # BLD AUTO: 4.36 X10E6/UL (ref 3.77–5.28)
RBC #/AREA URNS HPF: ABNORMAL /HPF (ref 0–2)
SODIUM SERPL-SCNC: 144 MMOL/L (ref 134–144)
SP GR UR: 1.01 (ref 1–1.03)
SPECIMEN STATUS REPORT, ROLRST: NORMAL
TIBC SERPL-MCNC: 227 UG/DL (ref 250–450)
TRIGL SERPL-MCNC: 126 MG/DL (ref 0–149)
UIBC SERPL-MCNC: 167 UG/DL (ref 131–425)
UROBILINOGEN UR STRIP-MCNC: 0.2 MG/DL (ref 0.2–1)
VLDLC SERPL CALC-MCNC: 25 MG/DL (ref 5–40)
WBC # BLD AUTO: 5.7 X10E3/UL (ref 3.4–10.8)
WBC #/AREA URNS HPF: ABNORMAL /HPF (ref 0–5)

## 2019-06-18 ENCOUNTER — TELEPHONE (OUTPATIENT)
Dept: INTERNAL MEDICINE CLINIC | Age: 59
End: 2019-06-18

## 2019-07-25 ENCOUNTER — HOSPITAL ENCOUNTER (OUTPATIENT)
Dept: CT IMAGING | Age: 59
Discharge: HOME | End: 2019-07-25
Attending: INTERNAL MEDICINE
Payer: COMMERCIAL

## 2019-07-25 DIAGNOSIS — C18.6 MALIGNANT NEOPLASM OF DESCENDING COLON (HCC): ICD-10-CM

## 2019-07-25 PROCEDURE — 74176 CT ABD & PELVIS W/O CONTRAST: CPT

## 2019-08-27 ENCOUNTER — HOSPITAL ENCOUNTER (OUTPATIENT)
Dept: LAB | Age: 59
Discharge: HOME | End: 2019-08-27
Payer: COMMERCIAL

## 2019-08-27 DIAGNOSIS — C18.6 MALIGNANT NEOPLASM OF DESCENDING COLON (HCC): ICD-10-CM

## 2019-08-27 LAB
ALBUMIN SERPL-MCNC: 3.6 G/DL (ref 3.4–5)
ALBUMIN/GLOB SERPL: 1.4 {RATIO} (ref 0.8–1.7)
ALP SERPL-CCNC: 242 U/L (ref 45–117)
ALT SERPL-CCNC: 31 U/L (ref 13–56)
ANION GAP SERPL CALC-SCNC: 6 MMOL/L (ref 3–18)
AST SERPL-CCNC: 24 U/L (ref 10–38)
ATRIAL RATE: 65 BPM
BASOPHILS # BLD: 0 K/UL (ref 0–0.1)
BASOPHILS NFR BLD: 1 % (ref 0–2)
BILIRUB SERPL-MCNC: 0.6 MG/DL (ref 0.2–1)
BUN SERPL-MCNC: 22 MG/DL (ref 7–18)
BUN/CREAT SERPL: 10 (ref 12–20)
CALCIUM SERPL-MCNC: 8.5 MG/DL (ref 8.5–10.1)
CALCULATED P AXIS, ECG09: 48 DEGREES
CALCULATED R AXIS, ECG10: -53 DEGREES
CALCULATED T AXIS, ECG11: 79 DEGREES
CHLORIDE SERPL-SCNC: 112 MMOL/L (ref 100–111)
CO2 SERPL-SCNC: 25 MMOL/L (ref 21–32)
CREAT SERPL-MCNC: 2.2 MG/DL (ref 0.6–1.3)
DIAGNOSIS, 93000: NORMAL
DIFFERENTIAL METHOD BLD: ABNORMAL
EOSINOPHIL # BLD: 0.1 K/UL (ref 0–0.4)
EOSINOPHIL NFR BLD: 3 % (ref 0–5)
ERYTHROCYTE [DISTWIDTH] IN BLOOD BY AUTOMATED COUNT: 14.3 % (ref 11.6–14.5)
GLOBULIN SER CALC-MCNC: 2.6 G/DL (ref 2–4)
GLUCOSE SERPL-MCNC: 97 MG/DL (ref 74–99)
HCT VFR BLD AUTO: 37.8 % (ref 35–45)
HGB BLD-MCNC: 12.3 G/DL (ref 12–16)
LYMPHOCYTES # BLD: 1.2 K/UL (ref 0.9–3.6)
LYMPHOCYTES NFR BLD: 25 % (ref 21–52)
MCH RBC QN AUTO: 29.9 PG (ref 24–34)
MCHC RBC AUTO-ENTMCNC: 32.5 G/DL (ref 31–37)
MCV RBC AUTO: 92 FL (ref 74–97)
MONOCYTES # BLD: 0.4 K/UL (ref 0.05–1.2)
MONOCYTES NFR BLD: 7 % (ref 3–10)
NEUTS SEG # BLD: 3.2 K/UL (ref 1.8–8)
NEUTS SEG NFR BLD: 64 % (ref 40–73)
P-R INTERVAL, ECG05: 184 MS
PLATELET # BLD AUTO: 184 K/UL (ref 135–420)
PMV BLD AUTO: 9.7 FL (ref 9.2–11.8)
POTASSIUM SERPL-SCNC: 4.2 MMOL/L (ref 3.5–5.5)
PROT SERPL-MCNC: 6.2 G/DL (ref 6.4–8.2)
Q-T INTERVAL, ECG07: 456 MS
QRS DURATION, ECG06: 132 MS
QTC CALCULATION (BEZET), ECG08: 474 MS
RBC # BLD AUTO: 4.11 M/UL (ref 4.2–5.3)
SODIUM SERPL-SCNC: 143 MMOL/L (ref 136–145)
VENTRICULAR RATE, ECG03: 65 BPM
WBC # BLD AUTO: 4.9 K/UL (ref 4.6–13.2)

## 2019-08-27 PROCEDURE — 80053 COMPREHEN METABOLIC PANEL: CPT

## 2019-08-27 PROCEDURE — 36415 COLL VENOUS BLD VENIPUNCTURE: CPT

## 2019-08-27 PROCEDURE — 93005 ELECTROCARDIOGRAM TRACING: CPT

## 2019-08-27 PROCEDURE — 85025 COMPLETE CBC W/AUTO DIFF WBC: CPT

## 2019-09-03 PROBLEM — E04.2 MULTINODULAR GOITER: Status: RESOLVED | Noted: 2018-06-20 | Resolved: 2019-09-03

## 2019-09-03 PROBLEM — N28.89 LEFT RENAL MASS: Status: RESOLVED | Noted: 2017-08-18 | Resolved: 2019-09-03

## 2019-09-17 PROBLEM — C18.9 COLON CANCER (HCC): Status: ACTIVE | Noted: 2019-09-17

## 2019-09-23 ENCOUNTER — TELEPHONE (OUTPATIENT)
Dept: INTERNAL MEDICINE CLINIC | Age: 59
End: 2019-09-23

## 2019-09-23 NOTE — TELEPHONE ENCOUNTER
Insurance called to verify phone number. They have been trying to reach patient since she got out of hospital. [address]ke [address] Dr. To be aware of the discharge information in cc.

## 2019-10-11 DIAGNOSIS — D50.9 IRON DEFICIENCY ANEMIA, UNSPECIFIED IRON DEFICIENCY ANEMIA TYPE: ICD-10-CM

## 2019-10-11 RX ORDER — ATORVASTATIN CALCIUM 20 MG/1
20 TABLET, FILM COATED ORAL DAILY
Qty: 90 TAB | Refills: 3 | Status: CANCELLED | OUTPATIENT
Start: 2019-10-11

## 2019-10-11 NOTE — TELEPHONE ENCOUNTER
Last Visit: 6/6/19 with MD Tamayo Staff  Next Appointment: 11/1/19 with MD Renetta Benítez  Previous Refill Encounter(s): 3/15/19 Protonix #60 with 1 refill    Requested Prescriptions     Pending Prescriptions Disp Refills    pantoprazole (PROTONIX) 40 mg tablet 90 Tab 0     Sig: Take 1 Tab by mouth daily. Patient still has refills at the pharmacy for Lipitor. I contacted [facility] to confirm and they stated the Rx was already in process to be filled.

## 2019-10-12 RX ORDER — PANTOPRAZOLE SODIUM 40 MG/1
40 TABLET, DELAYED RELEASE ORAL DAILY
Qty: 90 TAB | Refills: 1 | Status: SHIPPED | OUTPATIENT
Start: 2019-10-12 | End: 2020-06-24

## 2019-10-15 ENCOUNTER — HOSPITAL ENCOUNTER (OUTPATIENT)
Dept: INTERVENTIONAL RADIOLOGY/VASCULAR | Age: 59
Discharge: HOME | End: 2019-10-15
Attending: INTERNAL MEDICINE | Admitting: RADIOLOGY
Payer: COMMERCIAL

## 2019-10-15 VITALS
SYSTOLIC BLOOD PRESSURE: 174 MMHG | BODY MASS INDEX: 26.52 KG/M2 | RESPIRATION RATE: 9 BRPM | WEIGHT: 165 LBS | DIASTOLIC BLOOD PRESSURE: 84 MMHG | HEIGHT: 66 IN | OXYGEN SATURATION: 100 % | HEART RATE: 68 BPM

## 2019-10-15 DIAGNOSIS — C18.4 MALIGNANT NEOPLASM OF TRANSVERSE COLON (HCC): ICD-10-CM

## 2019-10-15 LAB
ANION GAP SERPL CALC-SCNC: 1 MMOL/L (ref 3–18)
APTT PPP: 27.7 SEC (ref 23–36.4)
BUN SERPL-MCNC: 27 MG/DL (ref 7–18)
BUN/CREAT SERPL: 13 (ref 12–20)
CALCIUM SERPL-MCNC: 9 MG/DL (ref 8.5–10.1)
CHLORIDE SERPL-SCNC: 115 MMOL/L (ref 100–111)
CO2 SERPL-SCNC: 26 MMOL/L (ref 21–32)
CREAT SERPL-MCNC: 2.07 MG/DL (ref 0.6–1.3)
ERYTHROCYTE [DISTWIDTH] IN BLOOD BY AUTOMATED COUNT: 14.7 % (ref 11.6–14.5)
GLUCOSE SERPL-MCNC: 101 MG/DL (ref 74–99)
HCT VFR BLD AUTO: 36.5 % (ref 35–45)
HGB BLD-MCNC: 11.7 G/DL (ref 12–16)
INR PPP: 1 (ref 0.8–1.2)
MCH RBC QN AUTO: 29.3 PG (ref 24–34)
MCHC RBC AUTO-ENTMCNC: 32.1 G/DL (ref 31–37)
MCV RBC AUTO: 91.3 FL (ref 74–97)
PLATELET # BLD AUTO: 196 K/UL (ref 135–420)
PMV BLD AUTO: 9.9 FL (ref 9.2–11.8)
POTASSIUM SERPL-SCNC: 4 MMOL/L (ref 3.5–5.5)
PROTHROMBIN TIME: 12.6 SEC (ref 11.5–15.2)
RBC # BLD AUTO: 4 M/UL (ref 4.2–5.3)
SODIUM SERPL-SCNC: 142 MMOL/L (ref 136–145)
WBC # BLD AUTO: 4.9 K/UL (ref 4.6–13.2)

## 2019-10-15 PROCEDURE — C1769 GUIDE WIRE: HCPCS

## 2019-10-15 PROCEDURE — 74011250636 HC RX REV CODE- 250/636: Performed by: PHYSICIAN ASSISTANT

## 2019-10-15 PROCEDURE — 85610 PROTHROMBIN TIME: CPT

## 2019-10-15 PROCEDURE — 36561 INSERT TUNNELED CV CATH: CPT

## 2019-10-15 PROCEDURE — C1893 INTRO/SHEATH, FIXED,NON-PEEL: HCPCS

## 2019-10-15 PROCEDURE — C1788 PORT, INDWELLING, IMP: HCPCS

## 2019-10-15 PROCEDURE — 77030004561 HC CATH ANGI DX COBRA ANGI -B

## 2019-10-15 PROCEDURE — 80048 BASIC METABOLIC PNL TOTAL CA: CPT

## 2019-10-15 PROCEDURE — 74011250636 HC RX REV CODE- 250/636: Performed by: RADIOLOGY

## 2019-10-15 PROCEDURE — 85730 THROMBOPLASTIN TIME PARTIAL: CPT

## 2019-10-15 PROCEDURE — C1729 CATH, DRAINAGE: HCPCS

## 2019-10-15 PROCEDURE — 74011000250 HC RX REV CODE- 250

## 2019-10-15 PROCEDURE — 77030022017 HC DRSG HEMO QCLOT ZMED -A

## 2019-10-15 PROCEDURE — 85027 COMPLETE CBC AUTOMATED: CPT

## 2019-10-15 RX ORDER — MIDAZOLAM HYDROCHLORIDE 1 MG/ML
1 INJECTION, SOLUTION INTRAMUSCULAR; INTRAVENOUS
Status: DISCONTINUED | OUTPATIENT
Start: 2019-10-15 | End: 2019-10-15 | Stop reason: HOSPADM

## 2019-10-15 RX ORDER — FENTANYL CITRATE 50 UG/ML
12.5-5 INJECTION, SOLUTION INTRAMUSCULAR; INTRAVENOUS
Status: DISCONTINUED | OUTPATIENT
Start: 2019-10-15 | End: 2019-10-15 | Stop reason: HOSPADM

## 2019-10-15 RX ORDER — HEPARIN SODIUM (PORCINE) LOCK FLUSH IV SOLN 100 UNIT/ML 100 UNIT/ML
500 SOLUTION INTRAVENOUS AS NEEDED
Status: DISCONTINUED | OUTPATIENT
Start: 2019-10-15 | End: 2019-10-15 | Stop reason: HOSPADM

## 2019-10-15 RX ORDER — SODIUM CHLORIDE 9 MG/ML
20 INJECTION, SOLUTION INTRAVENOUS CONTINUOUS
Status: DISCONTINUED | OUTPATIENT
Start: 2019-10-15 | End: 2019-10-15 | Stop reason: HOSPADM

## 2019-10-15 RX ORDER — LIDOCAINE HYDROCHLORIDE AND EPINEPHRINE 10; 10 MG/ML; UG/ML
INJECTION, SOLUTION INFILTRATION; PERINEURAL
Status: COMPLETED
Start: 2019-10-15 | End: 2019-10-15

## 2019-10-15 RX ORDER — VANCOMYCIN/0.9 % SOD CHLORIDE 1 G/100 ML
1000 PLASTIC BAG, INJECTION (ML) INTRAVENOUS ONCE
Status: COMPLETED | OUTPATIENT
Start: 2019-10-15 | End: 2019-10-15

## 2019-10-15 RX ORDER — LIDOCAINE HYDROCHLORIDE AND EPINEPHRINE 10; 10 MG/ML; UG/ML
20 INJECTION, SOLUTION INFILTRATION; PERINEURAL ONCE
Status: DISCONTINUED | OUTPATIENT
Start: 2019-10-15 | End: 2019-10-15 | Stop reason: HOSPADM

## 2019-10-15 RX ADMIN — HEPARIN SODIUM (PORCINE) LOCK FLUSH IV SOLN 100 UNIT/ML 500 UNITS: 100 SOLUTION at 11:39

## 2019-10-15 RX ADMIN — FENTANYL CITRATE 50 MCG: 50 INJECTION, SOLUTION INTRAMUSCULAR; INTRAVENOUS at 11:15

## 2019-10-15 RX ADMIN — VANCOMYCIN HYDROCHLORIDE 1000 MG: 10 INJECTION, POWDER, LYOPHILIZED, FOR SOLUTION INTRAVENOUS at 11:00

## 2019-10-15 RX ADMIN — FENTANYL CITRATE 25 MCG: 50 INJECTION, SOLUTION INTRAMUSCULAR; INTRAVENOUS at 11:25

## 2019-10-15 RX ADMIN — MIDAZOLAM 0.5 MG: 1 INJECTION INTRAMUSCULAR; INTRAVENOUS at 11:25

## 2019-10-15 RX ADMIN — MIDAZOLAM 1 MG: 1 INJECTION INTRAMUSCULAR; INTRAVENOUS at 11:05

## 2019-10-15 RX ADMIN — MIDAZOLAM 0.5 MG: 1 INJECTION INTRAMUSCULAR; INTRAVENOUS at 11:35

## 2019-10-15 RX ADMIN — MIDAZOLAM 1 MG: 1 INJECTION INTRAMUSCULAR; INTRAVENOUS at 11:15

## 2019-10-15 RX ADMIN — FENTANYL CITRATE 50 MCG: 50 INJECTION, SOLUTION INTRAMUSCULAR; INTRAVENOUS at 11:00

## 2019-10-15 RX ADMIN — MIDAZOLAM 1 MG: 1 INJECTION INTRAMUSCULAR; INTRAVENOUS at 11:00

## 2019-10-15 RX ADMIN — FENTANYL CITRATE 50 MCG: 50 INJECTION, SOLUTION INTRAMUSCULAR; INTRAVENOUS at 11:05

## 2019-10-15 RX ADMIN — FENTANYL CITRATE 25 MCG: 50 INJECTION, SOLUTION INTRAMUSCULAR; INTRAVENOUS at 11:35

## 2019-10-15 RX ADMIN — LIDOCAINE HYDROCHLORIDE,EPINEPHRINE BITARTRATE: 10; .01 INJECTION, SOLUTION INFILTRATION; PERINEURAL at 11:00

## 2019-10-15 NOTE — DISCHARGE INSTRUCTIONS
[facility] DISCHARGE SUMMARY from Nurse    PATIENT INSTRUCTIONS:    After general anesthesia or intravenous sedation, for 24 hours or while taking prescription Narcotics:  · Limit your activities  · Do not drive and operate hazardous machinery  · Do not make important personal or business decisions  · Do  not drink alcoholic beverages  · If you have not urinated within 8 hours after discharge, please contact your surgeon on call. Report the following to your surgeon:  · Excessive pain, swelling, redness or odor of or around the surgical area  · Temperature over 100.5  · Nausea and vomiting lasting longer than 4 hours or if unable to take medications  · Any signs of decreased circulation or nerve impairment to extremity: change in color, persistent  numbness, tingling, coldness or increase pain  · Any questions    What to do at Home:    *  Please give a list of your current medications to your Primary Care Provider. *  Please update this list whenever your medications are discontinued, doses are      changed, or new medications (including over-the-counter products) are added. *  Please carry medication information at all times in case of emergency situations. These are general instructions for a healthy lifestyle:    No smoking/ No tobacco products/ Avoid exposure to second hand smoke  Surgeon General's Warning:  Quitting smoking now greatly reduces serious risk to your health. Obesity, smoking, and sedentary lifestyle greatly increases your risk for illness    A healthy diet, regular physical exercise & weight monitoring are important for maintaining a healthy lifestyle    You may be retaining fluid if you have a history of heart failure or if you experience any of the following symptoms:  Weight gain of 3 pounds or more overnight or 5 pounds in a week, increased swelling in our hands or feet or shortness of breath while lying flat in bed.   Please call your doctor as soon as you notice any of these symptoms; do not wait until your next office visit. The discharge information has been reviewed with the {PATIENT PARENT GUARDIAN:[id number]}. The {PATIENT PARENT GUARDIAN:50920} verbalized understanding. Discharge medications reviewed with the {Dishcarge meds reviewed IKAQ:72263} and appropriate educational materials and side effects teaching were provided. ___________________________________________________________________________________________________________________________________  Implanted Port Discharge Instructions      General Instructions:   A port is like an implanted IV. They are usually ordered for patients who will be getting chemotherapy, but can also be used as an IV for long term antibiotics, large amounts of fluids, and/or blood products. Your blood can be drawn from your port for labs also. Those patients who do not have good veins find the ports convenient as they can get the IV they need with one stick. The port can be used long term, and the care is easy. The device is under the skin, and once the skin heals, care is minimal. All that is required is the nurse who accesses the port will need to flush it with heparinized saline after each use. Ports are usually placed in the chest wall, usually on the right side. But they can be place in the arms and in the abdomen. Home Care Instructions: If your port is in your arm, do not allow blood pressure or other IVs to be place in that arm. Do not allow bra straps or any clothing to rub the skin over the port. Do not bathe or swim until the skin has healed and if the port is accessed. Once it is healed, and when the port is not accessed, it is okay to bathe and swim. Restrict yourself to light activity for the first 5 days after getting the port put in, after that, resume normal activity slowly. You may resume your normal diet and medications.     Follow-Up Instructions: Please see your oncologist, or whatever physician ordered the port as he/she has requested of you. Call If: You should call your Physician and/or the Radiology Nurse if you notice redness, pus, swelling, or pain from the area of your incision. Call if you should develop a fever. The nurses who access your port will know to call your doctor if the port does not seem to be working properly. You need to tell the nurses who use the port if you should have any pain or swelling at the site during an infusion.     To Reach Us:       Patient Signature:  Date: 10/15/2019  Discharging Nurse: Junious Schaumann, RN

## 2019-10-15 NOTE — PROCEDURES
Interventional Radiology Brief Post Procedure Note     Procedure Performed: Mediport     : Yosi Mchugh PA-C     Assistant: None    Attending: Dr. Aditya Romero     Pre-operative Diagnosis: Colon cancer requiring chemotherapy treatment     Post-operative Diagnosis: Same      Anesthesia: 1% lidocaine and IV moderate sedation with Versed and Fentanyl administered and monitored by qualified nursing staff. Findings:    - Written and verbal informed consent was obtained. - Time Out was performed. - Permanent image storage performed on PACS. - Image-guided left chest dual-lumen Mediport placement performed using maximum barrier precautions and sterile technique. - Please refer to report on PACS for full details. Specimens: None     Complications: No immediate     Estimated Blood Loss:  Minimal     Blood transfusions:  None. Implants: Please see PACS report.       Condition: Stable      Disposition: Nursing recovery unit      Impression: Successful left chest Mediport placement     Kathryn [address]

## 2019-10-15 NOTE — PROGRESS NOTES
Cath holding summary    Patient escorted to cath holding from waiting area ambulatory, alert and oriented x 4, voicing no complaints. Changed into gown and placed on monitor. NPO since MN. Lab results, med rec and H&P reviewed on chart. PIV x 1 inserted without difficulty. Family to bedside. 1155 patient arrived to cath holding awake and alert, vital signs stable, left upper chest site clean dry and intact with no hematoma present, no C/O pain will continue to monitor.      1300 patient discharged home with family, vital signs stable, left upper chest site clean dry and intact with no hematoma present, no C/O pain

## 2019-10-15 NOTE — H&P
OUTPATIENT HISTORY AND PHYSICAL      Today 10/15/2019     Indication/Symptoms:   Bianca Mcmahon is a 60 y.o. female with a history of colon cancer who presents for an image-guided mediport placement with moderate sedation. Patient has been NPO since midnight and takes no blood thinning medications. Patient has a history of right-sided breast cancer s/p surgery thus mediport will be placed on the left side if anatomy permits. Current Meds:    Prior to Admission medications    Medication Sig Start Date End Date Taking? Authorizing Provider   pantoprazole (PROTONIX) 40 mg tablet Take 1 Tab by mouth daily. 10/12/19  Yes Romain Crooks MD   ergocalciferol (VITAMIN D2) 50,000 unit capsule Take 50,000 Units by mouth every thirty (30) days. Yes Provider, Historical   atorvastatin (LIPITOR) 20 mg tablet Take 1 Tab by mouth daily. Patient taking differently: Take 20 mg by mouth nightly. 6/6/19  Yes Mojgan Tovar MD   levothyroxine (SYNTHROID) 88 mcg tablet TAKE 1 TABLET BY MOUTH ONCE DAILY 11/4/18  Yes Provider, Historical   furosemide (LASIX) 40 mg tablet TAKE 1 TABLET BY MOUTH ONCE A DAY AS NEEDED 3/1/17  Yes Provider, Historical   lisinopril (PRINIVIL, ZESTRIL) 5 mg tablet Take 10 mg by mouth two (2) times a day. 1/6/17  Yes Provider, Historical   COREG 25 mg tablet two (2) times a day. 7/21/15  Yes Provider, Historical   ondansetron (ZOFRAN ODT) 8 mg disintegrating tablet 8 mg. 3/25/16   Provider, Historical       Allergies:       Allergies   Allergen Reactions    Codeine Swelling, Other (comments) and Not Reported This Time     Throat Swelling    Penicillins Other (comments) and Angioedema     N/V, swelling    Sulfa (Sulfonamide Antibiotics) Other (comments)     itching    Sulfur Hives    Tramadol Swelling       Comorbid Conditions:    Past Medical History:   Diagnosis Date    Cancer [facility]) 2000    Right breast ca     Colon cancer (HCC)     Congestive heart failure, unspecified     Gout     H/O total mastectomy of right breast     Heart disease     Hx: UTI (urinary tract infection)     Hypercholesterolemia     Iron deficiency anemia     Nausea & vomiting     Renal cell carcinoma of left kidney ([address] 12/17/15    Renal mass, left           Past Surgical History:   Procedure Laterality Date    HX CHOLECYSTECTOMY      HX MASTECTOMY Right     HX NEPHRECTOMY Right 3/22/16    Partial x2, Dr. Eva Faria, [facility]    HX ORTHOPAEDIC  04/19/2017    Trigger finger release R hand    HX RENAL BIOPSY Right 12/17/15    Ct guided bx, Dr. Edwina Street, [address] [place] [address] HX THYROIDECTOMY  6/20/2018     Data:    Visit Vitals  /74 (BP 1 Location: Left arm, BP Patient Position: At rest)   Pulse 78   Resp 26   Ht 5' 6\" (1.676 m)   Wt 74.8 kg (165 lb)   SpO2 100%   Breastfeeding? No   BMI 26.63 kg/m²   :  Recent Labs     10/15/19  0950        Recent Labs     10/15/19  0950   INR 1.0   APTT 27.7       The H & P and/or progress notes and any available imaging were reviewed. The risks, indications and possible alternatives to the procedure, including doing nothing, were discussed and informed consent was obtained. Physical Exam:      Mental status:   Alert and oriented. Examination specific to the procedure proposed to be performed and any co morbid conditions:   Mallampati classification 2 ,  ASA 3   Heart:   Regular rate. Lungs:   Normal respiratory effort. Symmetrical rise and fall of chest    The patient is an appropriate candidate to undergo the planned procedure and sedation.     Kathryn Messina

## 2019-10-17 ENCOUNTER — TELEPHONE (OUTPATIENT)
Dept: INTERNAL MEDICINE CLINIC | Age: 59
End: 2019-10-17

## 2019-10-17 NOTE — TELEPHONE ENCOUNTER
JAYCE DOMÍNGUEZ Key: [id number] - PA Case ID: [id number]   Outcome   Approvedtoday   Your PA request has been approved. DrugPantoprazole Sodium 40MG dr tablets    Start date 10-17-19 thru 10-16-22  Faxed to pharmacy.

## 2019-10-18 ENCOUNTER — HOSPITAL ENCOUNTER (OUTPATIENT)
Dept: PET IMAGING | Age: 59
Discharge: HOME | End: 2019-10-18
Attending: INTERNAL MEDICINE
Payer: COMMERCIAL

## 2019-10-18 DIAGNOSIS — C18.9 COLON CANCER (HCC): ICD-10-CM

## 2019-10-18 PROCEDURE — A9552 F18 FDG: HCPCS

## 2019-11-12 ENCOUNTER — HOSPITAL ENCOUNTER (OUTPATIENT)
Dept: LAB | Age: 59
Discharge: HOME | End: 2019-11-12
Payer: COMMERCIAL

## 2019-11-12 ENCOUNTER — OFFICE VISIT (OUTPATIENT)
Dept: INTERNAL MEDICINE CLINIC | Age: 59
End: 2019-11-12

## 2019-11-12 VITALS
SYSTOLIC BLOOD PRESSURE: 117 MMHG | RESPIRATION RATE: 16 BRPM | DIASTOLIC BLOOD PRESSURE: 73 MMHG | HEART RATE: 82 BPM | HEIGHT: 66 IN | WEIGHT: 171 LBS | TEMPERATURE: 97.3 F | BODY MASS INDEX: 27.48 KG/M2 | OXYGEN SATURATION: 99 %

## 2019-11-12 DIAGNOSIS — N18.4 CHRONIC KIDNEY DISEASE (CKD) STAGE G4/A1, SEVERELY DECREASED GLOMERULAR FILTRATION RATE (GFR) BETWEEN 15-29 ML/MIN/1.73 SQUARE METER AND ALBUMINURIA CREATININE RATIO LESS THAN 30 MG/G (HCC): ICD-10-CM

## 2019-11-12 DIAGNOSIS — R91.1 PULMONARY NODULE: ICD-10-CM

## 2019-11-12 DIAGNOSIS — T45.1X5A CHEMOTHERAPY INDUCED CARDIOMYOPATHY (HCC): ICD-10-CM

## 2019-11-12 DIAGNOSIS — I50.22 CHRONIC SYSTOLIC CONGESTIVE HEART FAILURE (HCC): ICD-10-CM

## 2019-11-12 DIAGNOSIS — C18.9 MALIGNANT NEOPLASM OF COLON, UNSPECIFIED PART OF COLON (HCC): ICD-10-CM

## 2019-11-12 DIAGNOSIS — E03.9 ACQUIRED HYPOTHYROIDISM: Primary | ICD-10-CM

## 2019-11-12 DIAGNOSIS — I42.7 CHEMOTHERAPY INDUCED CARDIOMYOPATHY (HCC): ICD-10-CM

## 2019-11-12 DIAGNOSIS — C64.2 MALIGNANT NEOPLASM OF LEFT KIDNEY (HCC): ICD-10-CM

## 2019-11-12 DIAGNOSIS — Z23 ENCOUNTER FOR IMMUNIZATION: ICD-10-CM

## 2019-11-12 DIAGNOSIS — E05.20 TOXIC MULTINODULAR GOITER: ICD-10-CM

## 2019-11-12 DIAGNOSIS — I10 ESSENTIAL HYPERTENSION: ICD-10-CM

## 2019-11-12 PROCEDURE — 84439 ASSAY OF FREE THYROXINE: CPT

## 2019-11-12 NOTE — PROGRESS NOTES
INTERNISTS [facility] [place]:  11/12/2019, MRN: [id number]      Allyson Gonzales is a 60 y.o. female and presents to clinic for Follow-up and Thyroid Problem    Subjective: The patient is a 58-year-old female with history of right sided breast cancer s/p right mastectomy, colon cancer (2019) s/p surgery, b/l clear cell carcinoma s/p b/l partial nephrectomies, pulmonary nodule, chemotherapy induced CMO/CHF, CKD (followed by ), type 2 DM, CLARENCE, multinodular goiter treated with a thyroidectomy, postoperative hypothyroidism, CLARENCE, HLD, GERD, vitamin D deficiency, and HTN. 1.  CKD and H/o Kidney Cancers: Status post partial nephrectomy for clear cell renal carcinoma. Followed by  every 3 months. No urinary symptoms. 2. CHF/CMO/HTN: She has a history of CHF secondary to chemotherapy. Her BP is 117/73 today. Her heart rate is 82. She recently had an echocardiogram.  Findings are listed below. She admits to having some dizziness. She takes Lasix as needed. No chest pain, palpitations, or shortness of breath today. She is also on carvedilol and lisinopril. 10/16/19 Echocardiogram (from the [facility]):   REDUCED LEFT VENTRICULAR SYSTOLIC FUNCTION WITH AN ESTIMATED EJECTION FRACTION OF 32% BY  RIOS'S BIPLANE. DIASTOLIC DYSFUNCTION PRESENT. GLOBAL LONGITUDINAL STRAIN OF -11.2%. MODERATE MITRAL REGURGITATION. NO HEMODYNAMICALLY SIGNIFICANT AORTIC, TRICUSPID, OR PULMONIC VALVULAR PATHOLOGY. ESTIMATED PULMONARY ARTERY PRESSURE OF 18 MMHG. FAT PAD PRESENT. 3.  Colon Cancer: Status post surgery. No abdominal pain. No rectal bleeding. No emesis. She is presently receiving chemotherapy. Her Mediport along her left anterior chest is intact. Her next appointment with her cancer team is in December. She recently had a PET scan. Findings are listed below. 10/18/19 PET Scan: No convincing PET evidence of metastatic colon carcinoma.  Moderate activity at bilateral mediastinal and hilar stable lymphadenopathy. Chronic granulomatous disease is favored over metastatic disease. This can be followed for stability by CT. New postsurgical changes in the abdomen. 4. Pulmonary Nodule: She had a CT scan as per the staging process of #3. A 5 mm pulmonary nodule was seen along the left lung. The patient is asymptomatic. The radiologist recommended a follow-up study in 12 months.    7/25/19 Chest/Abdomen/Pelvis CT:5 mm pulmonary nodule in the superior aspect of the left major fissure or adjacent to the fissure (image 12), consider follow-up CT in 12 months. 5 mm pulmonary nodule in the superior aspect of the left major fissure or adjacent to the fissure (image 12), consider follow-up CT in 12 months. 5.  Hypothyroidism: Status post thyroidectomy secondary to history of a multinodular goiter. Her Synthroid dose was increased to 112 mcg daily after her TSH was checked earlier this month and found to be very high. Results for Abdoulaye Fry (MRN [id number]) as of 11/24/2019 06:42   Ref.  Range 11/12/2019 13:03   T4, Free Latest Ref Range: 0.7 - 1.5 NG/DL 1.1   TSH Latest Ref Range: 0.36 - 3.74 uIU/mL 36.10 (H)           Patient Active Problem List    Diagnosis Date Noted    Colon cancer ([facility] 09/17/2019    Chronic systolic congestive heart failure ([facility] 11/15/2017    Chronic kidney disease (CKD) stage G4/A1, severely decreased glomerular filtration rate (GFR) between 15-29 mL/min/1.73 square meter and albuminuria creatinine ratio less than 30 mg/g ([address] 04/13/2017    Malignant neoplasm of left kidney ([address] 04/04/2016    Toxic multinodular goiter 12/04/2015    Obstructive sleep apnea syndrome, moderate 11/09/2015    Chemotherapy induced cardiomyopathy ([address] 08/11/2015    Type 2 [facility] mellitus with [facility] manifestations [facility] 08/03/2015    HX: breast cancer 08/03/2015       Current Outpatient Medications   Medication Sig Dispense Refill    pantoprazole (PROTONIX) 40 mg tablet Take 1 Tab by mouth daily. 90 Tab 1    ergocalciferol (VITAMIN D2) 50,000 unit capsule Take 50,000 Units by mouth every thirty (30) days.  atorvastatin (LIPITOR) 20 mg tablet Take 1 Tab by mouth daily. (Patient taking differently: Take 20 mg by mouth nightly.) 90 Tab 3    levothyroxine (SYNTHROID) 88 mcg tablet TAKE 1 TABLET BY MOUTH ONCE DAILY  3    ondansetron (ZOFRAN ODT) 8 mg disintegrating tablet 8 mg.       furosemide (LASIX) 40 mg tablet TAKE 1 TABLET BY MOUTH ONCE A DAY AS NEEDED  0    lisinopril (PRINIVIL, ZESTRIL) 5 mg tablet Take 10 mg by mouth two (2) times a day.  0    COREG 25 mg tablet two (2) times a day.  0       Allergies   Allergen Reactions    Codeine Swelling, Other (comments) and Not Reported This Time     Throat Swelling    Penicillins Other (comments) and Angioedema     N/V, swelling    Sulfa (Sulfonamide Antibiotics) Other (comments)     itching    Sulfur Hives    Tramadol Swelling       Past Medical History:   Diagnosis Date    Cancer [facility]) 2000    Right breast ca     Colon cancer (HCC)     Congestive heart failure, unspecified     Gout     H/O total mastectomy of right breast     Heart disease     Hx: UTI (urinary tract infection)     Hypercholesterolemia     Iron deficiency anemia     Nausea & vomiting     Renal cell carcinoma of left kidney ([facility] 12/17/15    Renal mass, left        Past Surgical History:   Procedure Laterality Date    HX CHOLECYSTECTOMY      HX MASTECTOMY Right     HX NEPHRECTOMY Right 3/22/16    Partial x2, Dr. Haylee Kwong, [facility]    HX ORTHOPAEDIC  04/19/2017    Trigger finger release R hand    HX RENAL BIOPSY Right 12/17/15    Ct guided bx, Dr. Adriana Ziegler, [facility]    HX THYROIDECTOMY  6/20/2018    IR INSERT TUNL CVC W PORT OVER 5 YEARS  10/15/2019       Family History   Problem Relation Age of Onset    Hypertension Father     Heart Attack Father     Heart Failure Father     Diabetes Father        Social History     Tobacco Use    Smoking status: Never Smoker    Smokeless tobacco: Never Used   Substance Use Topics    Alcohol use: No       ROS   Review of Systems   Constitutional: Negative for chills and fever. HENT: Negative for ear pain and sore throat. Eyes: Negative for blurred vision and pain. Respiratory: Negative for cough and shortness of breath. Cardiovascular: Negative for chest pain. Gastrointestinal: Negative for abdominal pain and blood in stool. Genitourinary: Negative for dysuria and hematuria. Musculoskeletal: Negative for myalgias. Skin: Negative for rash. Neurological: Negative for focal weakness and headaches. Endo/Heme/Allergies: Does not bruise/bleed easily. Psychiatric/Behavioral: Negative for substance abuse. Objective     Vitals:    11/12/19 1211   BP: 117/73   Pulse: 82   Resp: 16   Temp: 97.3 °F (36.3 °C)   TempSrc: Oral   SpO2: 99%   Weight: 171 lb (77.6 kg)   Height: 5' 6\" (1.676 m)   PainSc:   0 - No pain       Physical Exam   Constitutional: She is oriented to person, place, and time and well-developed, well-nourished, and in no distress. HENT:   Head: Normocephalic and atraumatic. Right Ear: External ear normal.   Left Ear: External ear normal.   Nose: Nose normal.   Mouth/Throat: Oropharynx is clear and moist. No oropharyngeal exudate. Eyes: Pupils are equal, round, and reactive to light. Conjunctivae and EOM are normal. Right eye exhibits no discharge. Left eye exhibits no discharge. No scleral icterus. Neck: Neck supple. Cardiovascular: Normal rate, regular rhythm, normal heart sounds and intact distal pulses. Exam reveals no gallop and no friction rub. No murmur heard. Pulmonary/Chest: Effort normal and breath sounds normal. No respiratory distress. She has no wheezes. She has no rales. Abdominal: Soft. Bowel sounds are normal. She exhibits no distension. There is no tenderness. There is no rebound and no guarding. Musculoskeletal:         General: No tenderness (B[place]) or edema. Lymphadenopathy:     She has no cervical adenopathy. Neurological: She is alert and oriented to person, place, and time. She exhibits normal muscle tone. Gait normal.   Skin: Skin is warm and dry. No erythema. Psychiatric: Affect normal.   Nursing note and vitals reviewed. LABS   Data Review:   Lab Results   Component Value Date/Time    WBC 4.9 10/15/2019 09:50 AM    HGB 11.7 (L) 10/15/2019 09:50 AM    HCT 36.5 10/15/2019 09:50 AM    PLATELET 871 31/58/2393 09:50 AM    MCV 91.3 10/15/2019 09:50 AM       Lab Results   Component Value Date/Time    Sodium 142 10/15/2019 09:50 AM    Potassium 4.0 10/15/2019 09:50 AM    Chloride 115 (H) 10/15/2019 09:50 AM    CO2 26 10/15/2019 09:50 AM    Anion gap 1 (L) 10/15/2019 09:50 AM    Glucose 101 (H) 10/15/2019 09:50 AM    BUN 27 (H) 10/15/2019 09:50 AM    Creatinine 2.07 (H) 10/15/2019 09:50 AM    BUN/Creatinine ratio 13 10/15/2019 09:50 AM    GFR est AA 30 (L) 10/15/2019 09:50 AM    GFR est non-AA 24 (L) 10/15/2019 09:50 AM    Calcium 9.0 10/15/2019 09:50 AM       Lab Results   Component Value Date/Time    Cholesterol, total 181 06/07/2019 01:44 PM    HDL Cholesterol 34 (L) 06/07/2019 01:44 PM    LDL, calculated 122 (H) 06/07/2019 01:44 PM    VLDL, calculated 25 06/07/2019 01:44 PM    Triglyceride 126 06/07/2019 01:44 PM    CHOL/HDL Ratio 3.8 01/26/2018 10:03 AM       Lab Results   Component Value Date/Time    Hemoglobin A1c 5.7 (H) 06/07/2019 01:44 PM    Hemoglobin A1c (POC) 6.2 07/31/2015 12:27 PM       Assessment/Plan:   1. Toxic multinodular goiter  - Checking TFTs after she has been on any Synthroid dose for at least 4 weeks. ORDERS:  - TSH AND FREE T4; Future    2. Pulmonary Nodule:  -We discussed that she will need a follow-up study in 2020 to ensure stability. All questions were answered. 3. Colon Cancer: Status post surgery. Receiving chemotherapy.  -I encouraged her to continue with treatment per her Oncology team's recommendations.     4. CKD and H/o Kidney Cancers: Stable.  -I encouraged her to follow-up with her Nephrology team.    5. HTN/CHF/CMO: Having some dizzy spells. BP is little low today. - She really uses Lasix as needed, I will take this off of her medication list today. - I would also reduce her Coreg dose scheduled to see  with them next few weeks, will defer to him. 6.  General:  -Her Pap and mammogram will be deferred until she has completed treatment. This was discussed with her today.  -She is in the process of getting genetic testing. Biopsies of her previous cancers were not suggestive of underlying Matos syndrome per review of the report. -The flu vaccine was administered today. - She will need an A1c check and an eye exam for her well controlled type II diet-controlled diabetes. Health Maintenance Due   Topic Date Due    PAP AKA CERVICAL CYTOLOGY  04/22/1980    BREAST CANCER SCRN MAMMOGRAM  11/10/2018    EYE EXAM RETINAL OR DILATED  12/19/2018    Influenza Age 9 to Adult  08/01/2019    HEMOGLOBIN A1C Q6M  12/07/2019     Lab review: labs are reviewed in the EHR and ordered as mentioned above. I have discussed the diagnosis with the patient and the intended plan as seen in the above orders. The patient has received an after-visit summary and questions were answered concerning future plans. I have discussed medication side effects and warnings with the patient as well. I have reviewed the plan of care with the patient, accepted their input and they are in agreement with the treatment goals. All questions were answered. The patient understands the plan of care. Handouts provided today with above information. Pt instructed if symptoms worsen to call the office or report to the ED for continued care. Greater than 50% of the visit time was spent in counseling and/or coordination of care.       Voice recognition was used to generate this report, which may have resulted in some phonetic based errors in grammar and contents. Even though attempts were made to correct all the mistakes, some may have been missed, and remained in the body of the document.           Paresh Waggoner MD

## 2019-11-12 NOTE — PATIENT INSTRUCTIONS
High Blood Pressure: Care Instructions  Overview    It's normal for blood pressure to go up and down throughout the day. But if it stays up, you have high blood pressure. Another name for high blood pressure is hypertension. Despite what a lot of people think, high blood pressure usually doesn't cause headaches or make you feel dizzy or lightheaded. It usually has no symptoms. But it does increase your risk of stroke, heart attack, and other problems. You and your doctor will talk about your risks of these problems based on your blood pressure. Your doctor will give you a goal for your blood pressure. Your goal will be based on your health and your age. Lifestyle changes, such as eating healthy and being active, are always important to help lower blood pressure. You might also take medicine to reach your blood pressure goal.  Follow-up care is a key part of your treatment and safety. Be sure to make and go to all appointments, and call your doctor if you are having problems. It's also a good idea to know your test results and keep a list of the medicines you take. How can you care for yourself at home? Medical treatment  · If you stop taking your medicine, your blood pressure will go back up. You may take one or more types of medicine to lower your blood pressure. Be safe with medicines. Take your medicine exactly as prescribed. Call your doctor if you think you are having a problem with your medicine. · Talk to your doctor before you start taking aspirin every day. Aspirin can help certain people lower their risk of a heart attack or stroke. But taking aspirin isn't right for everyone, because it can cause serious bleeding. · See your doctor regularly. You may need to see the doctor more often at first or until your blood pressure comes down. · If you are taking blood pressure medicine, talk to your doctor before you take decongestants or anti-inflammatory medicine, such as ibuprofen.  Some of these medicines can raise blood pressure. · Learn how to check your blood pressure at home. Lifestyle changes  · Stay at a healthy weight. This is especially important if you put on weight around the waist. Losing even 10 pounds can help you lower your blood pressure. · If your doctor recommends it, get more exercise. Walking is a good choice. Bit by bit, increase the amount you walk every day. Try for at least 30 minutes on most days of the week. You also may want to swim, bike, or do other activities. · Avoid or limit alcohol. Talk to your doctor about whether you can drink any alcohol. · Try to limit how much sodium you eat to less than 2,300 milligrams (mg) a day. Your doctor may ask you to try to eat less than 1,500 mg a day. · Eat plenty of fruits (such as bananas and oranges), vegetables, legumes, whole grains, and low-fat dairy products. · Lower the amount of saturated fat in your diet. Saturated fat is found in animal products such as milk, cheese, and meat. Limiting these foods may help you lose weight and also lower your risk for heart disease. · Do not smoke. Smoking increases your risk for heart attack and stroke. If you need help quitting, talk to your doctor about stop-smoking programs and medicines. These can increase your chances of quitting for good. When should you call for help? Call  911 anytime you think you may need emergency care. This may mean having symptoms that suggest that your blood pressure is causing a serious heart or blood vessel problem. Your blood pressure may be over 180/120.   For example, call  911 if:    · You have symptoms of a heart attack. These may include:  ? Chest pain or pressure, or a strange feeling in the chest.  ? Sweating. ? Shortness of breath. ? Nausea or vomiting. ? Pain, pressure, or a strange feeling in the back, neck, jaw, or upper belly or in one or both shoulders or arms. ? Lightheadedness or sudden weakness.   ? A fast or irregular heartbeat.     · You have symptoms of a stroke. These may include:  ? Sudden numbness, tingling, weakness, or loss of movement in your face, arm, or leg, especially on only one side of your body. ? Sudden vision changes. ? Sudden trouble speaking. ? Sudden confusion or trouble understanding simple statements. ? Sudden problems with walking or balance. ? A sudden, severe headache that is different from past headaches.     · You have severe back or belly pain.    Do not wait until your blood pressure comes down on its own. Get help right away.   Call your doctor now or seek immediate care if:    · Your blood pressure is much higher than normal (such as 180/120 or higher), but you don't have symptoms.     · You think high blood pressure is causing symptoms, such as:  ? Severe headache.  ? Blurry vision.    Watch closely for changes in your health, and be sure to contact your doctor if:    · Your blood pressure measures higher than your doctor recommends at least 2 times. That means the top number is higher or the bottom number is higher, or both.     · You think you may be having side effects from your blood pressure medicine. Where can you learn more? Go to http://birdie-juany.info/. Enter V564 in the search box to learn more about \"High Blood Pressure: Care Instructions. \"  Current as of: April 9, 2019  Content Version: 12.2  © 7284-1172 [employer]. Care instructions adapted under license by [employer] (which disclaims liability or warranty for this information). If you have questions about a medical condition or this instruction, always ask your healthcare professional. [place] [address] any warranty or liability for your use of this information. Vaccine Information Statement    Influenza (Flu) Vaccine (Inactivated or Recombinant): What You Need to Know    Many Vaccine Information Statements are available in [place] and other languages.  See www.immunize.org/vis  Hojas de información sobre vacunas están disponibles en español y en muchos otros idiomas. Visite www.immunize.org/vis    1. Why get vaccinated? Influenza vaccine can prevent influenza (flu). Flu is a contagious disease that spreads around the [facility] every year, usually between October and May. Anyone can get the flu, but it is more dangerous for some people. Infants and young children, people 72years of age and older, pregnant women, and people with certain health conditions or a weakened immune system are at greatest risk of flu complications. Pneumonia, bronchitis, sinus infections and ear infections are examples of flu-related complications. If you have a medical condition, such as heart disease, cancer or diabetes, flu can make it worse. Flu can cause fever and chills, sore throat, muscle aches, fatigue, cough, headache, and runny or stuffy nose. Some people may have vomiting and diarrhea, though this is more common in children than adults. Each year thousands of people in the [facility] die from flu, and many more are hospitalized. Flu vaccine prevents millions of illnesses and flu-related visits to the doctor each year. 2. Influenza vaccines     CDC recommends everyone 10months of age and older get vaccinated every flu season. Children 6 months through 6years of age may need 2 doses during a single flu season. Everyone else needs only 1 dose each flu season. It takes about 2 weeks for protection to develop after vaccination. There are many flu viruses, and they are always changing. Each year a new flu vaccine is made to protect against three or four viruses that are likely to cause disease in the upcoming flu season. Even when the vaccine doesnt exactly match these viruses, it may still provide some protection. Influenza vaccine does not cause flu. Influenza vaccine may be given at the same time as other vaccines.     3. Talk with your health care provider    Tell your vaccine provider if the person getting the vaccine:   Has had an allergic reaction after a previous dose of influenza vaccine, or has any severe, life-threatening allergies.  Has ever had Guillain-Barré Syndrome (also called GBS). In some cases, your health care provider may decide to postpone influenza vaccination to a future visit. People with minor illnesses, such as a cold, may be vaccinated. People who are moderately or severely ill should usually wait until they recover before getting influenza vaccine. Your health care provider can give you more information. 4. Risks of a reaction     Soreness, redness, and swelling where shot is given, fever, muscle aches, and headache can happen after influenza vaccine.  There may be a very small increased risk of Guillain-Barré Syndrome (GBS) after inactivated influenza vaccine (the flu shot). Roel Current children who get the flu shot along with pneumococcal vaccine (PCV13), and/or DTaP vaccine at the same time might be slightly more likely to have a seizure caused by fever. Tell your health care provider if a child who is getting flu vaccine has ever had a seizure. People sometimes faint after medical procedures, including vaccination. Tell your provider if you feel dizzy or have vision changes or ringing in the ears. As with any medicine, there is a very remote chance of a vaccine causing a severe allergic reaction, other serious injury, or death. 5. What if there is a serious problem? An allergic reaction could occur after the vaccinated person leaves the clinic. If you see signs of a severe allergic reaction (hives, swelling of the face and throat, difficulty breathing, a fast heartbeat, dizziness, or weakness), call 9-1-1 and get the person to the nearest hospital.    For other signs that concern you, call your health care provider.     Adverse reactions should be reported to the Vaccine Adverse Event Reporting System ([employer]). Your health care provider will usually file this report, or you can do it yourself. Visit the VAERS website at www.vaers. [facility].gov or call 8-324.376.8675. VAERS is only for reporting reactions, and VAERS staff do not give medical advice. 6. The National Vaccine Injury Compensation Program    The [facility] Vaccine Injury Compensation Program (VICP) is a federal program that was created to compensate people who may have been injured by certain vaccines. Visit the VICP website at www.[facility].gov/vaccinecompensation or call 9-629.473.9218 to learn about the program and about filing a claim. There is a time limit to file a claim for compensation. 7. How can I learn more?  Ask your health care provider.  Call your local or state health department.  Contact the Centers for Disease Control and Prevention (CDC):  - Call 4-382.947.9897 (1-800-CDC-INFO) or  - Visit CDCs influenza website at www.cdc.gov/flu    Vaccine Information Statement (Interim)  Inactivated Influenza Vaccine   8/15/2019  42 MEAGAN Poe 309PL-66   Department of Health and Human Services  Centers for Disease Control and Prevention    Office Use Only

## 2019-11-12 NOTE — PROGRESS NOTES
Neela Cody presents today for No chief complaint on file. Depression Screening:  3 most recent PHQ Screens 2/18/2019   Little interest or pleasure in doing things Not at all   Feeling down, depressed, irritable, or hopeless Not at all   Total Score PHQ 2 0       Learning Assessment:  Learning Assessment 2/18/2019   PRIMARY LEARNER Patient   HIGHEST LEVEL OF EDUCATION - PRIMARY LEARNER  GRADUATED HIGH SCHOOL OR GED   BARRIERS PRIMARY LEARNER NONE   CO-LEARNER CAREGIVER No   PRIMARY LANGUAGE ENGLISH   LEARNER PREFERENCE PRIMARY DEMONSTRATION   ANSWERED BY patient   RELATIONSHIP SELF       Abuse Screening:  Abuse Screening Questionnaire 2/18/2019   Do you ever feel afraid of your partner? N   Are you in a relationship with someone who physically or mentally threatens you? N   Is it safe for you to go home? Y       Fall Risk  Fall Risk Assessment, last 12 mths 2/18/2019   Able to walk? Yes   Fall in past 12 months? No           Coordination of Care:  1. Have you been to the ER, urgent care clinic since your last visit? Hospitalized since your last visit? yes    2. Have you seen or consulted any other health care providers outside of the [address] [place] [address] since your last visit? Include any pap smears or colon screening. yes      Advance Directive:  1. Do you have an advance directive in place? Patient Reply:no      2. If not, would you like material regarding how to put one in place?  Patient Reply: yes

## 2019-11-13 ENCOUNTER — TELEPHONE (OUTPATIENT)
Dept: INTERNAL MEDICINE CLINIC | Age: 59
End: 2019-11-13

## 2019-11-13 DIAGNOSIS — E05.20 TOXIC MULTINODULAR GOITER: Primary | ICD-10-CM

## 2019-11-13 LAB
T4 FREE SERPL-MCNC: 1.1 NG/DL (ref 0.7–1.5)
TSH SERPL DL<=0.05 MIU/L-ACNC: 36.1 UIU/ML (ref 0.36–3.74)

## 2019-11-13 RX ORDER — LEVOTHYROXINE SODIUM 112 UG/1
112 TABLET ORAL
Qty: 90 TAB | Refills: 3 | Status: SHIPPED | OUTPATIENT
Start: 2019-11-13 | End: 2020-10-20

## 2019-11-13 NOTE — TELEPHONE ENCOUNTER
----- Message from Freedom Colon MD sent at 11/13/2019 12:01 PM EST -----  Please let her know that her TSH is very high at 36.1. Her free T4 is normal at 1.1. This indicates that her Synthroid dose is too low. I have increased her Synthroid 112 mcg daily. Please schedule her for follow-up TFTs.      Dr. Christina Garrido  [facility] [address], [address] [place] [address] Str.  Phone: [phone]  Fax: [phone]

## 2019-11-13 NOTE — PROGRESS NOTES
Please let her know that her TSH is very high at 36.1. Her free T4 is normal at 1.1. This indicates that her Synthroid dose is too low. I have increased her Synthroid 112 mcg daily. Please schedule her for follow-up TFTs.      Dr. Neo Tyson  [facility], [address] [place] [address], [address] [place] [address] Str.  Phone: [phone]  Fax: [phone]

## 2019-11-13 NOTE — TELEPHONE ENCOUNTER
Patient contacted, patient identified with two identifiers (Name & ). Patient aware of results per DR. Dennis and verbalizes understanding. Patient aware of new medication, and has scheduled a lab visit for 4 weeks.

## 2019-11-23 PROBLEM — D64.9 ANEMIA: Status: ACTIVE | Noted: 2019-11-23

## 2019-11-23 PROBLEM — R91.1 PULMONARY NODULE: Status: ACTIVE | Noted: 2019-11-23

## 2019-11-23 PROBLEM — E03.9 ACQUIRED HYPOTHYROIDISM: Status: ACTIVE | Noted: 2019-11-23

## 2019-11-23 PROBLEM — I10 ESSENTIAL HYPERTENSION: Status: ACTIVE | Noted: 2019-11-23

## 2019-11-23 PROBLEM — K21.9 GERD (GASTROESOPHAGEAL REFLUX DISEASE): Status: ACTIVE | Noted: 2019-11-23

## 2019-11-23 PROBLEM — E55.9 VITAMIN D DEFICIENCY: Status: ACTIVE | Noted: 2019-11-23

## 2019-11-23 PROBLEM — E78.2 MIXED HYPERLIPIDEMIA: Status: ACTIVE | Noted: 2019-11-23

## 2020-01-12 ENCOUNTER — HOSPITAL ENCOUNTER (EMERGENCY)
Age: 60
Discharge: STILL ADMITTED OR ADMINISTRATIVE | End: 2020-01-12
Attending: EMERGENCY MEDICINE
Payer: COMMERCIAL

## 2020-01-12 PROBLEM — K52.9 ENTERITIS: Status: ACTIVE | Noted: 2020-01-12

## 2020-01-12 PROBLEM — Z79.899 ON ANTINEOPLASTIC CHEMOTHERAPY: Status: ACTIVE | Noted: 2020-01-12

## 2020-01-12 PROBLEM — N18.9 CHRONIC KIDNEY DISEASE: Status: ACTIVE | Noted: 2020-01-12

## 2020-01-12 PROCEDURE — 75810000275 HC EMERGENCY DEPT VISIT NO LEVEL OF CARE

## 2020-04-07 DIAGNOSIS — D50.9 IRON DEFICIENCY ANEMIA, UNSPECIFIED IRON DEFICIENCY ANEMIA TYPE: ICD-10-CM

## 2020-06-15 ENCOUNTER — HOSPITAL ENCOUNTER (OUTPATIENT)
Dept: ULTRASOUND IMAGING | Age: 60
Discharge: HOME | End: 2020-06-15
Attending: UROLOGY
Payer: COMMERCIAL

## 2020-06-15 PROCEDURE — 76770 US EXAM ABDO BACK WALL COMP: CPT

## 2020-06-16 ENCOUNTER — HOSPITAL ENCOUNTER (OUTPATIENT)
Dept: CT IMAGING | Age: 60
Discharge: HOME | End: 2020-06-16
Attending: UROLOGY
Payer: COMMERCIAL

## 2020-06-16 PROCEDURE — 74176 CT ABD & PELVIS W/O CONTRAST: CPT

## 2020-06-19 ENCOUNTER — HOSPITAL ENCOUNTER (OUTPATIENT)
Dept: PET IMAGING | Age: 60
Discharge: HOME | End: 2020-06-19
Attending: INTERNAL MEDICINE
Payer: COMMERCIAL

## 2020-06-19 DIAGNOSIS — C18.4 MALIGNANT NEOPLASM OF TRANSVERSE COLON (HCC): ICD-10-CM

## 2020-06-19 PROCEDURE — A9552 F18 FDG: HCPCS

## 2020-06-27 PROBLEM — N25.81 SECONDARY HYPERPARATHYROIDISM OF RENAL ORIGIN (HCC): Status: ACTIVE | Noted: 2020-06-27

## 2020-06-27 PROBLEM — N18.9 ANEMIA IN CHRONIC KIDNEY DISEASE: Status: ACTIVE | Noted: 2020-06-27

## 2020-06-27 PROBLEM — M10.9 GOUT: Status: ACTIVE | Noted: 2020-06-27

## 2020-06-27 PROBLEM — C50.911 BREAST CANCER, RIGHT (HCC): Status: ACTIVE | Noted: 2020-06-27

## 2020-06-27 PROBLEM — E78.00 HYPERCHOLESTEREMIA: Status: ACTIVE | Noted: 2019-11-23

## 2020-06-27 PROBLEM — C18.6 MALIGNANT TUMOR OF DESCENDING COLON (HCC): Status: ACTIVE | Noted: 2019-07-22

## 2020-06-27 PROBLEM — D63.1 ANEMIA IN CHRONIC KIDNEY DISEASE: Status: ACTIVE | Noted: 2020-06-27

## 2020-06-27 PROBLEM — R80.9 PROTEINURIA: Status: ACTIVE | Noted: 2020-06-27

## 2020-06-27 PROBLEM — I50.9 ACUTE ON CHRONIC CONGESTIVE HEART FAILURE (HCC): Status: ACTIVE | Noted: 2017-02-28

## 2020-06-29 DIAGNOSIS — E05.20 TOXIC MULTINODULAR GOITER: ICD-10-CM

## 2020-06-29 DIAGNOSIS — E78.00 HYPERCHOLESTEREMIA: ICD-10-CM

## 2020-06-29 DIAGNOSIS — C18.6 MALIGNANT TUMOR OF DESCENDING COLON (HCC): ICD-10-CM

## 2020-06-29 DIAGNOSIS — C64.2 MALIGNANT NEOPLASM OF LEFT KIDNEY (HCC): ICD-10-CM

## 2020-06-29 DIAGNOSIS — E11.22 TYPE 2 DIABETES MELLITUS WITH STAGE 4 CHRONIC KIDNEY DISEASE, WITHOUT LONG-TERM CURRENT USE OF INSULIN (HCC): ICD-10-CM

## 2020-06-29 DIAGNOSIS — I10 HYPERTENSION, UNSPECIFIED TYPE: Primary | ICD-10-CM

## 2020-06-29 DIAGNOSIS — E03.9 ACQUIRED HYPOTHYROIDISM: ICD-10-CM

## 2020-06-29 DIAGNOSIS — N18.4 CHRONIC KIDNEY DISEASE (CKD) STAGE G4/A1, SEVERELY DECREASED GLOMERULAR FILTRATION RATE (GFR) BETWEEN 15-29 ML/MIN/1.73 SQUARE METER AND ALBUMINURIA CREATININE RATIO LESS THAN 30 MG/G (HCC): ICD-10-CM

## 2020-06-29 DIAGNOSIS — M10.9 GOUT, UNSPECIFIED CAUSE, UNSPECIFIED CHRONICITY, UNSPECIFIED SITE: ICD-10-CM

## 2020-06-29 DIAGNOSIS — D63.1 ANEMIA IN CHRONIC KIDNEY DISEASE, UNSPECIFIED CKD STAGE: ICD-10-CM

## 2020-06-29 DIAGNOSIS — N18.4 TYPE 2 DIABETES MELLITUS WITH STAGE 4 CHRONIC KIDNEY DISEASE, WITHOUT LONG-TERM CURRENT USE OF INSULIN (HCC): ICD-10-CM

## 2020-06-29 DIAGNOSIS — C50.911 MALIGNANT NEOPLASM OF RIGHT FEMALE BREAST, UNSPECIFIED ESTROGEN RECEPTOR STATUS, UNSPECIFIED SITE OF BREAST (HCC): ICD-10-CM

## 2020-06-29 DIAGNOSIS — N18.9 ANEMIA IN CHRONIC KIDNEY DISEASE, UNSPECIFIED CKD STAGE: ICD-10-CM

## 2020-07-01 ENCOUNTER — TELEPHONE (OUTPATIENT)
Dept: INTERNAL MEDICINE CLINIC | Age: 60
End: 2020-07-01

## 2020-07-01 NOTE — TELEPHONE ENCOUNTER
----- Message from Leonides Alvarez MD sent at 6/29/2020  3:08 PM EDT -----  Regarding: F/u apts needed  Please schedule the pt for a 30 min apt for a check up. Please schedule her for labs 1 wk before her follow up apt.     Thanks,  Dr. María Morel  [facility], [address] [place] [address], [address] [place] [address] Str.  Phone: [phone]  Fax: [phone]

## 2020-10-20 DIAGNOSIS — N18.4 TYPE 2 DIABETES MELLITUS WITH STAGE 4 CHRONIC KIDNEY DISEASE, WITHOUT LONG-TERM CURRENT USE OF INSULIN (HCC): ICD-10-CM

## 2020-10-20 DIAGNOSIS — C50.911 MALIGNANT NEOPLASM OF RIGHT FEMALE BREAST, UNSPECIFIED ESTROGEN RECEPTOR STATUS, UNSPECIFIED SITE OF BREAST (HCC): ICD-10-CM

## 2020-10-20 DIAGNOSIS — E11.22 TYPE 2 DIABETES MELLITUS WITH STAGE 4 CHRONIC KIDNEY DISEASE, WITHOUT LONG-TERM CURRENT USE OF INSULIN (HCC): ICD-10-CM

## 2020-10-20 DIAGNOSIS — E03.9 ACQUIRED HYPOTHYROIDISM: ICD-10-CM

## 2020-10-20 DIAGNOSIS — I10 HYPERTENSION, UNSPECIFIED TYPE: Primary | ICD-10-CM

## 2020-10-20 DIAGNOSIS — C18.6 MALIGNANT TUMOR OF DESCENDING COLON (HCC): ICD-10-CM

## 2020-10-20 DIAGNOSIS — E05.20 TOXIC MULTINODULAR GOITER: ICD-10-CM

## 2020-10-20 RX ORDER — LEVOTHYROXINE SODIUM 112 UG/1
TABLET ORAL
Qty: 90 TAB | Refills: 0 | Status: SHIPPED | OUTPATIENT
Start: 2020-10-20 | End: 2021-01-26

## 2020-10-20 NOTE — TELEPHONE ENCOUNTER
Please schedule the patient for a 30 minute virtual appointment with me during the first or second week of November for medication refills. Please schedule her for labs 1 week before her appointment.       Dr. Kieran Patel  [facility] [address], [address] [place] [address].  Phone: [phone]  Fax: [phone]

## 2020-10-22 ENCOUNTER — HOSPITAL ENCOUNTER (OUTPATIENT)
Dept: PET IMAGING | Age: 60
Discharge: HOME | End: 2020-10-22
Attending: INTERNAL MEDICINE
Payer: COMMERCIAL

## 2020-10-22 DIAGNOSIS — C18.2 MALIGNANT NEOPLASM OF ASCENDING COLON (HCC): ICD-10-CM

## 2020-10-22 PROCEDURE — A9552 F18 FDG: HCPCS

## 2020-10-29 ENCOUNTER — HOSPITAL ENCOUNTER (OUTPATIENT)
Dept: LAB | Age: 60
Discharge: HOME | End: 2020-10-29
Payer: COMMERCIAL

## 2020-10-29 ENCOUNTER — APPOINTMENT (OUTPATIENT)
Dept: INTERNAL MEDICINE CLINIC | Age: 60
End: 2020-10-29

## 2020-10-29 DIAGNOSIS — I10 HYPERTENSION, UNSPECIFIED TYPE: ICD-10-CM

## 2020-10-29 DIAGNOSIS — E11.22 TYPE 2 DIABETES MELLITUS WITH STAGE 4 CHRONIC KIDNEY DISEASE, WITHOUT LONG-TERM CURRENT USE OF INSULIN (HCC): ICD-10-CM

## 2020-10-29 DIAGNOSIS — E03.9 ACQUIRED HYPOTHYROIDISM: ICD-10-CM

## 2020-10-29 DIAGNOSIS — M10.9 GOUT, UNSPECIFIED CAUSE, UNSPECIFIED CHRONICITY, UNSPECIFIED SITE: ICD-10-CM

## 2020-10-29 DIAGNOSIS — D63.1 ANEMIA IN CHRONIC KIDNEY DISEASE, UNSPECIFIED CKD STAGE: ICD-10-CM

## 2020-10-29 DIAGNOSIS — N18.9 ANEMIA IN CHRONIC KIDNEY DISEASE, UNSPECIFIED CKD STAGE: ICD-10-CM

## 2020-10-29 DIAGNOSIS — N18.4 TYPE 2 DIABETES MELLITUS WITH STAGE 4 CHRONIC KIDNEY DISEASE, WITHOUT LONG-TERM CURRENT USE OF INSULIN (HCC): ICD-10-CM

## 2020-10-29 DIAGNOSIS — C50.911 MALIGNANT NEOPLASM OF RIGHT FEMALE BREAST, UNSPECIFIED ESTROGEN RECEPTOR STATUS, UNSPECIFIED SITE OF BREAST (HCC): ICD-10-CM

## 2020-10-29 DIAGNOSIS — N18.4 CHRONIC KIDNEY DISEASE (CKD) STAGE G4/A1, SEVERELY DECREASED GLOMERULAR FILTRATION RATE (GFR) BETWEEN 15-29 ML/MIN/1.73 SQUARE METER AND ALBUMINURIA CREATININE RATIO LESS THAN 30 MG/G (HCC): ICD-10-CM

## 2020-10-29 DIAGNOSIS — C18.6 MALIGNANT TUMOR OF DESCENDING COLON (HCC): ICD-10-CM

## 2020-10-29 LAB
25(OH)D3 SERPL-MCNC: 32.3 NG/ML (ref 30–100)
ALBUMIN SERPL-MCNC: 3.7 G/DL (ref 3.4–5)
ALBUMIN/GLOB SERPL: 1.5 {RATIO} (ref 0.8–1.7)
ALP SERPL-CCNC: 324 U/L (ref 45–117)
ALT SERPL-CCNC: 36 U/L (ref 13–56)
ANION GAP SERPL CALC-SCNC: 9 MMOL/L (ref 3–18)
AST SERPL-CCNC: 30 U/L (ref 10–38)
BASOPHILS # BLD: 0 K/UL (ref 0–0.1)
BASOPHILS NFR BLD: 1 % (ref 0–2)
BILIRUB SERPL-MCNC: 0.8 MG/DL (ref 0.2–1)
BUN SERPL-MCNC: 41 MG/DL (ref 7–18)
BUN/CREAT SERPL: 19 (ref 12–20)
CALCIUM SERPL-MCNC: 9.1 MG/DL (ref 8.5–10.1)
CALCIUM SERPL-MCNC: 9.3 MG/DL (ref 8.5–10.1)
CHLORIDE SERPL-SCNC: 110 MMOL/L (ref 100–111)
CHOLEST SERPL-MCNC: 186 MG/DL
CO2 SERPL-SCNC: 22 MMOL/L (ref 21–32)
CREAT SERPL-MCNC: 2.21 MG/DL (ref 0.6–1.3)
CREAT UR-MCNC: 74 MG/DL (ref 30–125)
DIFFERENTIAL METHOD BLD: ABNORMAL
EOSINOPHIL # BLD: 0.2 K/UL (ref 0–0.4)
EOSINOPHIL NFR BLD: 3 % (ref 0–5)
ERYTHROCYTE [DISTWIDTH] IN BLOOD BY AUTOMATED COUNT: 15.8 % (ref 11.6–14.5)
FERRITIN SERPL-MCNC: 20 NG/ML (ref 8–388)
GLOBULIN SER CALC-MCNC: 2.4 G/DL (ref 2–4)
GLUCOSE SERPL-MCNC: 112 MG/DL (ref 74–99)
HBA1C MFR BLD: 6 % (ref 4.2–5.6)
HCT VFR BLD AUTO: 39.1 % (ref 35–45)
HDLC SERPL-MCNC: 35 MG/DL (ref 40–60)
HDLC SERPL: 5.3 {RATIO} (ref 0–5)
HGB BLD-MCNC: 12.5 G/DL (ref 12–16)
IRON SATN MFR SERPL: 14 % (ref 20–50)
IRON SERPL-MCNC: 49 UG/DL (ref 50–175)
LDLC SERPL CALC-MCNC: 130 MG/DL (ref 0–100)
LIPID PROFILE,FLP: ABNORMAL
LYMPHOCYTES # BLD: 1.7 K/UL (ref 0.9–3.6)
LYMPHOCYTES NFR BLD: 30 % (ref 21–52)
MCH RBC QN AUTO: 29.9 PG (ref 24–34)
MCHC RBC AUTO-ENTMCNC: 32 G/DL (ref 31–37)
MCV RBC AUTO: 93.5 FL (ref 74–97)
MICROALBUMIN UR-MCNC: 3.39 MG/DL (ref 0–3)
MICROALBUMIN/CREAT UR-RTO: 46 MG/G (ref 0–30)
MONOCYTES # BLD: 0.4 K/UL (ref 0.05–1.2)
MONOCYTES NFR BLD: 7 % (ref 3–10)
NEUTS SEG # BLD: 3.3 K/UL (ref 1.8–8)
NEUTS SEG NFR BLD: 59 % (ref 40–73)
PLATELET # BLD AUTO: 217 K/UL (ref 135–420)
PMV BLD AUTO: 10.4 FL (ref 9.2–11.8)
POTASSIUM SERPL-SCNC: 4.9 MMOL/L (ref 3.5–5.5)
PROT SERPL-MCNC: 6.1 G/DL (ref 6.4–8.2)
PTH-INTACT SERPL-MCNC: 121 PG/ML (ref 18.4–88)
RBC # BLD AUTO: 4.18 M/UL (ref 4.2–5.3)
SODIUM SERPL-SCNC: 141 MMOL/L (ref 136–145)
T4 FREE SERPL-MCNC: 1.4 NG/DL (ref 0.7–1.5)
TIBC SERPL-MCNC: 356 UG/DL (ref 250–450)
TRIGL SERPL-MCNC: 105 MG/DL (ref ?–150)
TSH SERPL DL<=0.05 MIU/L-ACNC: 4.32 UIU/ML (ref 0.36–3.74)
URATE SERPL-MCNC: 6.1 MG/DL (ref 2.6–7.2)
VLDLC SERPL CALC-MCNC: 21 MG/DL
WBC # BLD AUTO: 5.6 K/UL (ref 4.6–13.2)

## 2020-10-29 PROCEDURE — 84439 ASSAY OF FREE THYROXINE: CPT

## 2020-10-29 PROCEDURE — 83540 ASSAY OF IRON: CPT

## 2020-10-29 PROCEDURE — 80053 COMPREHEN METABOLIC PANEL: CPT

## 2020-10-29 PROCEDURE — 80061 LIPID PANEL: CPT

## 2020-10-29 PROCEDURE — 83036 HEMOGLOBIN GLYCOSYLATED A1C: CPT

## 2020-10-29 PROCEDURE — 36415 COLL VENOUS BLD VENIPUNCTURE: CPT

## 2020-10-29 PROCEDURE — 82043 UR ALBUMIN QUANTITATIVE: CPT

## 2020-10-29 PROCEDURE — 83970 ASSAY OF PARATHORMONE: CPT

## 2020-10-29 PROCEDURE — 82728 ASSAY OF FERRITIN: CPT

## 2020-10-29 PROCEDURE — 82306 VITAMIN D 25 HYDROXY: CPT

## 2020-10-29 PROCEDURE — 85025 COMPLETE CBC W/AUTO DIFF WBC: CPT

## 2020-10-29 PROCEDURE — 84550 ASSAY OF BLOOD/URIC ACID: CPT

## 2020-10-30 ENCOUNTER — HOSPITAL ENCOUNTER (OUTPATIENT)
Dept: MAMMOGRAPHY | Age: 60
Discharge: HOME | End: 2020-10-30
Attending: INTERNAL MEDICINE
Payer: COMMERCIAL

## 2020-10-30 DIAGNOSIS — Z12.31 VISIT FOR SCREENING MAMMOGRAM: ICD-10-CM

## 2020-10-30 PROCEDURE — 77063 BREAST TOMOSYNTHESIS BI: CPT

## 2020-11-05 ENCOUNTER — VIRTUAL VISIT (OUTPATIENT)
Dept: INTERNAL MEDICINE CLINIC | Age: 60
End: 2020-11-05
Payer: COMMERCIAL

## 2020-11-05 DIAGNOSIS — E11.22 CONTROLLED TYPE 2 DIABETES MELLITUS WITH STAGE 4 CHRONIC KIDNEY DISEASE, WITHOUT LONG-TERM CURRENT USE OF INSULIN (HCC): Primary | ICD-10-CM

## 2020-11-05 DIAGNOSIS — M10.9 GOUT, UNSPECIFIED CAUSE, UNSPECIFIED CHRONICITY, UNSPECIFIED SITE: ICD-10-CM

## 2020-11-05 DIAGNOSIS — C64.2 MALIGNANT NEOPLASM OF LEFT KIDNEY (HCC): ICD-10-CM

## 2020-11-05 DIAGNOSIS — C50.911 MALIGNANT NEOPLASM OF RIGHT FEMALE BREAST, UNSPECIFIED ESTROGEN RECEPTOR STATUS, UNSPECIFIED SITE OF BREAST (HCC): ICD-10-CM

## 2020-11-05 DIAGNOSIS — I10 HYPERTENSION, UNSPECIFIED TYPE: ICD-10-CM

## 2020-11-05 DIAGNOSIS — N18.4 CONTROLLED TYPE 2 DIABETES MELLITUS WITH STAGE 4 CHRONIC KIDNEY DISEASE, WITHOUT LONG-TERM CURRENT USE OF INSULIN (HCC): Primary | ICD-10-CM

## 2020-11-05 DIAGNOSIS — I50.22 CHRONIC SYSTOLIC CONGESTIVE HEART FAILURE (HCC): ICD-10-CM

## 2020-11-05 DIAGNOSIS — G47.33 OSA (OBSTRUCTIVE SLEEP APNEA): ICD-10-CM

## 2020-11-05 DIAGNOSIS — N18.4 CHRONIC KIDNEY DISEASE (CKD) STAGE G4/A1, SEVERELY DECREASED GLOMERULAR FILTRATION RATE (GFR) BETWEEN 15-29 ML/MIN/1.73 SQUARE METER AND ALBUMINURIA CREATININE RATIO LESS THAN 30 MG/G (HCC): ICD-10-CM

## 2020-11-05 DIAGNOSIS — E03.9 ACQUIRED HYPOTHYROIDISM: ICD-10-CM

## 2020-11-05 DIAGNOSIS — Z12.4 ENCOUNTER FOR SCREENING FOR CERVICAL CANCER: ICD-10-CM

## 2020-11-05 DIAGNOSIS — K52.9 ENTERITIS: ICD-10-CM

## 2020-11-05 DIAGNOSIS — C18.6 MALIGNANT TUMOR OF DESCENDING COLON (HCC): ICD-10-CM

## 2020-11-05 PROCEDURE — 99214 OFFICE O/P EST MOD 30 MIN: CPT | Performed by: INTERNAL MEDICINE

## 2020-11-05 NOTE — PROGRESS NOTES
Melyssa Cervantes is a 63 y.o. female who was seen by synchronous (real-time) audio-video technology on 11/5/2020. Assessment & Plan:   1. Type 2 Diabetes: Diet controlled. Continue the low-carb diet. Checking labs in 4 to 6 months. I will follow-up with her in the office at that time. Referral for a formal eye exam placed. 2.  Health Maintenance:  Referral to a gynecologist for a Pap. I encouraged her to get her flu shot at a local pharmacy. 3. Cancer Hx: Stable. She has a history of colon, kidney, and breast cancer. Continue to see oncology for regular checkups. Awaiting PET scan results. 4.  Hypothyroidism: TSH was mildly elevated but her free T4 was normal.  Okay to continue with Synthroid 112 mcg daily for now. I will recheck her labs in 4 to 6 months. I will recheck them sooner if she develops any symptoms. 5.CKD: Stable. Continue to follow-up with Nephrology. Checking labs in 4 to 6 months. 6. CHF: Improving. Continue to follow-up with Cardiology. Continue with Rx as prescribed. Checking labs in 4 to 6 months. 7. Suspected CLARENCE:  Referral placed to Sleep Medicine for CLARENCE evaluation. 8. Enteritis Hx and Gout: Enteritis symptoms have resolved. Checking a uric acid level in 4 to 6 months. Continue with allopurinol. Lab review: labs are reviewed in the EHR and ordered as mentioned above. I have discussed the diagnosis with the patient and the intended plan as seen in the above orders. I have discussed medication side effects and warnings with the patient as well. I have reviewed the plan of care with the patient, accepted their input and they are in agreement with the treatment goals. All questions were answered. The patient understands the plan of care. Pt instructed if symptoms worsen to call the office or report to the ED for continued care. Greater than 50% of the visit time was spent in counseling and/or coordination of care.      Voice recognition was used to generate this report, which may have resulted in some phonetic based errors in grammar and contents. Even though attempts were made to correct all the mistakes, some may have been missed, and remained in the body of the document. Subjective:   Millicent Sharma was seen for   Chief Complaint   Patient presents with    Follow-up     The patient is a 79-year-old female with history of right sided breast cancer s/p right mastectomy, colon cancer (2019) s/p surgery, b/l clear cell carcinoma s/p b/l partial nephrectomies, pulmonary nodule, chemotherapy induced CMO/CHF, CKD (followed by ), type 2 DM, CLARENCE, multinodular goiter treated with a thyroidectomy, postoperative hypothyroidism, CLARENCE, HLD, GERD, vitamin D deficiency, and HTN. 1. Enteritis Hx and Gout: She was discharged from the hospital in January from enteritis. At that time, she also had a flareup of her gout. Since then, she reports: She has not had a gout flareup since that time. 2. Cancer Hx: She just had a PET scan. Results are pending. She has a history of right breast cancer, colon cancer, and kidney cancer. She is followed by Oncology and specialists. No bleeding. She is overdue for her Pap as mentioned below. 3. CHF Exacerbation: Hospitalized in February. +Coreg, lipitor, and Entresto. She is presently asymptomatic. Her most recent cardiac MRI shows improvement in her ejection fraction since her hospitalization in February at which time her EF was 25%. 10/9/20 Cardiac MRI: . Limited evaluation of the myocardium without the use of gadolinium  contrast.  Mildly dilated left ventricle. Mildly dilated left atrium. Moderately depressed LV systolic function (LVEF 90%). Normal RV systolic function (RVEF 23%). Mild mitral regurgitation. Small pericardial effusion. Evidence of bilateral hilar lymphadenopathy. Recommend correlation with  dedicated chest CT.      4. CKD: She sees her nephrologist every 3 months. Her most recent labs show that her creatinine is unchanged in the 2.2 range. Her electrolytes are stable. 5. Health Maintenance:  - Overdue for a PAP  - Flu shot: Overdue. 6. Suspected CLARENCE: Untreated. +Snoring. 7. Type 2 DM: Her A1C is 6 when checked last month. Diet controlled. Her A1C was 6.6 in August of 2015. Last eye exam: +Blurry vision (mainly along her right eye). She used to go to [facility]. 8.  Hypothyroidism: Taking Synthroid 112 mcg daily. Her most recent labs show that her TSH is mildly elevated in the 4 range. Her free T4 is 1.4. She is asymptomatic. Prior to Admission medications    Medication Sig Start Date End Date Taking? Authorizing Provider   levothyroxine (SYNTHROID) 112 mcg tablet TAKE 1 TABLET BY MOUTH EVERY DAY BEFORE BREAKFAST 10/20/20   Maya Gray MD   allopurinoL (ZYLOPRIM) 100 mg tablet TAKE 1 TABLET BY MOUTH EVERY DAY 4/29/20   Provider, Historical   Entresto 49-51 mg tab tablet TAKE 1 TABLET BY MOUTH TWICE A DAY 5/28/20   Provider, Historical   DULoxetine (CYMBALTA) 30 mg capsule TAKE 1 CAPSULE BY MOUTH EVERY DAY 5/28/20   Provider, Historical   lansoprazole (PREVACID) 30 mg capsule TAKE ONE CAPSULE BY MOUTH ONCE DAILY 5/28/20   Provider, Historical   ondansetron (ZOFRAN ODT) 8 mg disintegrating tablet Take 1 Tab by mouth every eight (8) hours as needed for Nausea or Vomiting. 1/16/20   Lonnie Jones MD   calcitRIOL (ROCALTROL) 0.25 mcg capsule Take 0.25 mcg by mouth every Monday, Wednesday, Friday. Provider, Historical   ergocalciferol (VITAMIN D2) 50,000 unit capsule Take 50,000 Units by mouth every thirty (30) days. Provider, Historical   atorvastatin (LIPITOR) 20 mg tablet Take 1 Tab by mouth daily. Patient taking differently: Take 20 mg by mouth nightly. 6/6/19   Tank Avila MD   COREG 25 mg tablet Take 25 mg by mouth two (2) times a day.  7/21/15   Provider, Historical     Allergies   Allergen Reactions    Codeine Swelling, Other (comments) and Not Reported This Time     Throat Swelling    Penicillins Other (comments) and Angioedema     N/V, swelling    Tramadol Swelling    Sulfa (Sulfonamide Antibiotics) Other (comments)     itching    Sulfur Hives     Past Medical History:   Diagnosis Date    Cancer [facility]) 2000    Right breast ca     Colon cancer (HCC)     Congestive heart failure, unspecified     Gout     H/O total mastectomy of right breast     Heart disease     Hx: UTI (urinary tract infection)     Hypercholesterolemia     Iron deficiency anemia     Nausea & vomiting     Renal cell carcinoma of left kidney ([facility] 12/17/15    Renal mass, left     Thyroid cancer ([facility] [address] [facility]     2017     Past Surgical History:   Procedure Laterality Date    HX CHOLECYSTECTOMY      HX MASTECTOMY Right     HX NEPHRECTOMY Right 3/22/16    Partial x2, Dr. Warner Philip, [facility]    HX ORTHOPAEDIC  04/19/2017    Trigger finger release R hand    HX RENAL BIOPSY Right 12/17/15    Ct guided bx, Dr. Carleen Kinsey, [facility]    HX THYROIDECTOMY  6/20/2018    IR [address] CVC W PORT OVER 5 YEARS  10/15/2019     Family History   Problem Relation Age of Onset    Hypertension Father     Heart Attack Father     Heart Failure Father     Diabetes Father      Social History     Socioeconomic History    Marital status:      Spouse name: Not on file    Number of children: Not on file    Years of education: Not on file    Highest education level: Not on file   Tobacco Use    Smoking status: Never Smoker    Smokeless tobacco: Never Used   Substance and Sexual Activity    Alcohol use: No    Drug use: No       ROS:  Gen: No fever/chills  HEENT: No sore throat, eye pain, ear pain, or congestion.  No HA  CV: No CP  Resp: No cough/SOB  GI: No abdominal pain  : No hematuria/dysuria  Derm: No rash  Neuro: No new paresthesias/weakness  Musc: No new myalgias/jt pain  Psych: No depression sx      Objective:     General: alert, cooperative, no distress   Mental  status: mental status: alert, oriented to person, place, and time, normal mood, behavior, speech, dress, motor activity, and thought processes   Resp: resp: normal effort and no respiratory distress   Neuro: neuro: no gross deficits   Skin: skin: no discoloration or lesions of concern on visible areas     LABS:  Lab Results   Component Value Date/Time    Sodium 141 10/29/2020 09:16 AM    Potassium 4.9 10/29/2020 09:16 AM    Chloride 110 10/29/2020 09:16 AM    CO2 22 10/29/2020 09:16 AM    Anion gap 9 10/29/2020 09:16 AM    Glucose 112 (H) 10/29/2020 09:16 AM    BUN 41 (H) 10/29/2020 09:16 AM    Creatinine 2.21 (H) 10/29/2020 09:16 AM    BUN/Creatinine ratio 19 10/29/2020 09:16 AM    GFR est AA 27 (L) 10/29/2020 09:16 AM    GFR est non-AA 23 (L) 10/29/2020 09:16 AM    Calcium 9.3 10/29/2020 09:16 AM    Calcium 9.1 10/29/2020 09:16 AM       Lab Results   Component Value Date/Time    Cholesterol, total 186 10/29/2020 09:16 AM    HDL Cholesterol 35 (L) 10/29/2020 09:16 AM    LDL, calculated 130 (H) 10/29/2020 09:16 AM    VLDL, calculated 21 10/29/2020 09:16 AM    Triglyceride 105 10/29/2020 09:16 AM    CHOL/HDL Ratio 5.3 (H) 10/29/2020 09:16 AM       Lab Results   Component Value Date/Time    WBC 5.6 10/29/2020 09:16 AM    HGB 12.5 10/29/2020 09:16 AM    HCT 39.1 10/29/2020 09:16 AM    PLATELET 517 64/67/9748 09:16 AM    MCV 93.5 10/29/2020 09:16 AM       Lab Results   Component Value Date/Time    Hemoglobin A1c 6.0 (H) 10/29/2020 09:16 AM    Hemoglobin A1c (POC) 6.2 07/31/2015 12:27 PM       Lab Results   Component Value Date/Time    TSH 4.32 (H) 10/29/2020 09:16 AM           Due to this being a TeleHealth  evaluation, many elements of the physical examination are unable to be assessed.      The pt was seen by synchronous (real-time) audio-video technology, and/or her healthcare decision maker, is aware that this patient-initiated, Telehealth encounter is a billable service, with coverage as determined by her insurance carrier. She is aware that she may receive a bill and has provided verbal consent to proceed: Yes. The pt is being evaluated by a video visit encounter for concerns as above. A caregiver was present when appropriate. Due to this being a TeleHealth encounter (During [facility] public health emergency), evaluation of the following organ systems was limited: Vitals/Constitutional/EENT/Resp/CV/GI//MS/Neuro/Skin/Heme-Lymph-Imm. Pursuant to the emergency declaration under the [address] [place] [address], [facility] waiver authority and the [employer] Act, this Virtual  Visit was conducted, with patient's (and/or legal guardian's) consent, to reduce the patient's risk of exposure to COVID-19 and provide necessary medical care. Services were provided through a video synchronous discussion virtually to substitute for in-person clinic visit. Patient and provider were located at their individual homes. We discussed the expected course, resolution and complications of the diagnosis(es) in detail. Medication risks, benefits, costs, interactions, and alternatives were discussed as indicated. I advised her to contact the office if her condition worsens, changes or fails to improve as anticipated. She expressed understanding with the diagnosis(es) and plan.      Brenton Anderson MD

## 2020-11-17 ENCOUNTER — TELEPHONE (OUTPATIENT)
Dept: INTERNAL MEDICINE CLINIC | Age: 60
End: 2020-11-17

## 2020-11-17 NOTE — TELEPHONE ENCOUNTER
----- Message from Jerica Hay MD sent at 11/16/2020  5:26 PM EST -----  Regarding: F/u apts needed in April or May  Please schedule the pt for a follow up in office 30 min apt with me in April or May. Please schedule for labs 1 wk before her apt.     Thanks,  Dr. Stevie Ricks  [facility] [address], [address] [place] [address] Str.  Phone: [phone]  Fax: [phone]

## 2020-12-23 ENCOUNTER — DOCUMENTATION ONLY (OUTPATIENT)
Dept: PULMONOLOGY | Age: 60
End: 2020-12-23

## 2020-12-23 NOTE — PROGRESS NOTES
Lf vm for pt to speak w/Aster in regs to np sleep apt ref by Dr Carol Oden;  when/where if any evals/studies/cpap/dme?   Ask screening questions

## 2021-01-13 ENCOUNTER — TELEPHONE (OUTPATIENT)
Dept: INTERNAL MEDICINE CLINIC | Age: 61
End: 2021-01-13

## 2021-01-13 NOTE — TELEPHONE ENCOUNTER
Patient had COVID around the 1st of December. She still has a cough and wheezing in her chest. She is concerned it may be pneumonia.

## 2021-01-14 LAB — LEFT VENTRICULAR EJECTION FRACTION, EXTERNAL: 15

## 2021-01-26 DIAGNOSIS — E05.20 TOXIC MULTINODULAR GOITER: ICD-10-CM

## 2021-01-26 RX ORDER — LEVOTHYROXINE SODIUM 112 UG/1
TABLET ORAL
Qty: 90 TAB | Refills: 0 | Status: SHIPPED | OUTPATIENT
Start: 2021-01-26 | End: 2021-02-17

## 2021-01-26 NOTE — TELEPHONE ENCOUNTER
Please schedule for an in office 30 min apt with me in March or February. Please schedule for labs 1 wk before her apt.     Dr. Mile Buchanan  [facility], [address] [place] [address], [address] [place] [address] Str.  Phone: [phone]  Fax: [phone]

## 2021-02-04 ENCOUNTER — TRANSCRIBE ORDER (OUTPATIENT)
Dept: SCHEDULING | Age: 61
End: 2021-02-04

## 2021-02-04 DIAGNOSIS — C50.411 MALIGNANT NEOPLASM OF UPPER-OUTER QUADRANT OF RIGHT FEMALE BREAST (HCC): Primary | ICD-10-CM

## 2021-02-04 NOTE — TELEPHONE ENCOUNTER
Last Visit: 11/5/20 with MD Jim Medeiros  Next Appointment: none  Previous Refill Encounter(s): 6/6/19 #90 with 3 refills    Requested Prescriptions     Pending Prescriptions Disp Refills    atorvastatin (LIPITOR) 20 mg tablet 90 Tab 3     Sig: Take 1 Tab by mouth daily.

## 2021-02-09 ENCOUNTER — DOCUMENTATION ONLY (OUTPATIENT)
Dept: PULMONOLOGY | Age: 61
End: 2021-02-09

## 2021-02-09 RX ORDER — ATORVASTATIN CALCIUM 20 MG/1
20 TABLET, FILM COATED ORAL DAILY
Qty: 90 TAB | Refills: 3 | Status: SHIPPED | OUTPATIENT
Start: 2021-02-09 | End: 2021-06-21

## 2021-02-09 NOTE — TELEPHONE ENCOUNTER
Please schedule the patient for a 30-minute in office or virtual appointment with me in March. Please schedule her for labs a week before her appointment.     Dr. Child Friday  [facility], [address] [place] [address], [address] [place] [address].  Phone: [phone]  Fax: [phone]

## 2021-02-11 ENCOUNTER — HOSPITAL ENCOUNTER (OUTPATIENT)
Dept: PET IMAGING | Age: 61
Discharge: HOME | End: 2021-02-11
Attending: INTERNAL MEDICINE
Payer: COMMERCIAL

## 2021-02-11 DIAGNOSIS — C50.411 MALIGNANT NEOPLASM OF UPPER-OUTER QUADRANT OF RIGHT FEMALE BREAST (HCC): ICD-10-CM

## 2021-02-11 PROCEDURE — 78815 PET IMAGE W/CT SKULL-THIGH: CPT

## 2021-02-17 ENCOUNTER — TRANSCRIBE ORDER (OUTPATIENT)
Dept: SCHEDULING | Age: 61
End: 2021-02-17

## 2021-02-17 DIAGNOSIS — E05.20 TOXIC MULTINODULAR GOITER: ICD-10-CM

## 2021-02-17 DIAGNOSIS — C50.411 MALIGNANT NEOPLASM OF UPPER-OUTER QUADRANT OF RIGHT FEMALE BREAST (HCC): ICD-10-CM

## 2021-02-17 DIAGNOSIS — C18.4 MALIGNANT NEOPLASM OF TRANSVERSE COLON (HCC): Primary | ICD-10-CM

## 2021-02-17 RX ORDER — LEVOTHYROXINE SODIUM 112 UG/1
TABLET ORAL
Qty: 90 TAB | Refills: 0 | Status: SHIPPED | OUTPATIENT
Start: 2021-02-17 | End: 2021-07-12

## 2021-02-17 NOTE — TELEPHONE ENCOUNTER
Please schedule for a 30 min in office visit with me in April. Please schedule for labs 1 wk before her apt.     Dr. Child Friday  [facility], [facility], [address] [place] [address] Str.  Phone: [phone]  Fax: [phone]

## 2021-02-18 RX ORDER — LANSOPRAZOLE 30 MG/1
30 CAPSULE, DELAYED RELEASE ORAL DAILY
Qty: 90 CAP | Refills: 1 | Status: SHIPPED | OUTPATIENT
Start: 2021-02-18 | End: 2021-07-12

## 2021-02-18 NOTE — TELEPHONE ENCOUNTER
Requested Prescriptions     Pending Prescriptions Disp Refills    lansoprazole (PREVACID) 30 mg capsule

## 2021-02-18 NOTE — TELEPHONE ENCOUNTER
This med is listed as historical. Please sign if appropriate. Last Visit: 11/5/20 with MD Franca Hopkins  Next Appointment: 4/28/21 with MD Franca Hopkins    Requested Prescriptions     Pending Prescriptions Disp Refills    lansoprazole (PREVACID) 30 mg capsule 90 Cap 1     Sig: Take 1 Cap by mouth daily.

## 2021-02-18 NOTE — TELEPHONE ENCOUNTER
appt scheduled    Pt also wanted you to be aware she has been in and out of the hospital with congestive heart failure.     She also tested positive for covid in Dec 2020

## 2021-03-05 ENCOUNTER — HOSPITAL ENCOUNTER (OUTPATIENT)
Dept: INTERVENTIONAL RADIOLOGY/VASCULAR | Age: 61
Discharge: HOME | End: 2021-03-05
Attending: PHYSICIAN ASSISTANT | Admitting: RADIOLOGY
Payer: COMMERCIAL

## 2021-03-05 VITALS
RESPIRATION RATE: 14 BRPM | OXYGEN SATURATION: 94 % | SYSTOLIC BLOOD PRESSURE: 106 MMHG | HEART RATE: 84 BPM | TEMPERATURE: 98.3 F | DIASTOLIC BLOOD PRESSURE: 69 MMHG

## 2021-03-05 DIAGNOSIS — C50.411 MALIGNANT NEOPLASM OF UPPER-OUTER QUADRANT OF RIGHT FEMALE BREAST (HCC): ICD-10-CM

## 2021-03-05 DIAGNOSIS — C18.4 MALIGNANT NEOPLASM OF TRANSVERSE COLON (HCC): ICD-10-CM

## 2021-03-05 LAB
ANION GAP SERPL CALC-SCNC: 6 MMOL/L (ref 3–18)
APTT PPP: 26.5 SEC (ref 23–36.4)
BUN SERPL-MCNC: 40 MG/DL (ref 7–18)
BUN/CREAT SERPL: 15 (ref 12–20)
CALCIUM SERPL-MCNC: 9 MG/DL (ref 8.5–10.1)
CHLORIDE SERPL-SCNC: 109 MMOL/L (ref 100–111)
CO2 SERPL-SCNC: 26 MMOL/L (ref 21–32)
CREAT SERPL-MCNC: 2.67 MG/DL (ref 0.6–1.3)
ERYTHROCYTE [DISTWIDTH] IN BLOOD BY AUTOMATED COUNT: 16.1 % (ref 11.6–14.5)
GLUCOSE SERPL-MCNC: 107 MG/DL (ref 74–99)
HCT VFR BLD AUTO: 38.2 % (ref 35–45)
HGB BLD-MCNC: 12.3 G/DL (ref 12–16)
INR PPP: 1.1 (ref 0.8–1.2)
MCH RBC QN AUTO: 30.2 PG (ref 24–34)
MCHC RBC AUTO-ENTMCNC: 32.2 G/DL (ref 31–37)
MCV RBC AUTO: 93.9 FL (ref 74–97)
PLATELET # BLD AUTO: 170 K/UL (ref 135–420)
PMV BLD AUTO: 11.5 FL (ref 9.2–11.8)
POTASSIUM SERPL-SCNC: 4.3 MMOL/L (ref 3.5–5.5)
PROTHROMBIN TIME: 14 SEC (ref 11.5–15.2)
RBC # BLD AUTO: 4.07 M/UL (ref 4.2–5.3)
SODIUM SERPL-SCNC: 141 MMOL/L (ref 136–145)
WBC # BLD AUTO: 6.4 K/UL (ref 4.6–13.2)

## 2021-03-05 PROCEDURE — 85730 THROMBOPLASTIN TIME PARTIAL: CPT

## 2021-03-05 PROCEDURE — 74011000258 HC RX REV CODE- 258: Performed by: RADIOLOGY

## 2021-03-05 PROCEDURE — 74011250636 HC RX REV CODE- 250/636: Performed by: RADIOLOGY

## 2021-03-05 PROCEDURE — 74011000250 HC RX REV CODE- 250: Performed by: RADIOLOGY

## 2021-03-05 PROCEDURE — 77001 FLUOROGUIDE FOR VEIN DEVICE: CPT

## 2021-03-05 PROCEDURE — 85027 COMPLETE CBC AUTOMATED: CPT

## 2021-03-05 PROCEDURE — 80048 BASIC METABOLIC PNL TOTAL CA: CPT

## 2021-03-05 PROCEDURE — 85610 PROTHROMBIN TIME: CPT

## 2021-03-05 PROCEDURE — 36415 COLL VENOUS BLD VENIPUNCTURE: CPT

## 2021-03-05 RX ORDER — LIDOCAINE HYDROCHLORIDE AND EPINEPHRINE 20; 5 MG/ML; UG/ML
20 INJECTION, SOLUTION EPIDURAL; INFILTRATION; INTRACAUDAL; PERINEURAL ONCE
Status: COMPLETED | OUTPATIENT
Start: 2021-03-05 | End: 2021-03-05

## 2021-03-05 RX ORDER — FENTANYL CITRATE 50 UG/ML
12.5-5 INJECTION, SOLUTION INTRAMUSCULAR; INTRAVENOUS
Status: DISCONTINUED | OUTPATIENT
Start: 2021-03-05 | End: 2021-03-05 | Stop reason: ALTCHOICE

## 2021-03-05 RX ORDER — MIDAZOLAM HYDROCHLORIDE 1 MG/ML
.5-2 INJECTION, SOLUTION INTRAMUSCULAR; INTRAVENOUS
Status: DISCONTINUED | OUTPATIENT
Start: 2021-03-05 | End: 2021-03-05 | Stop reason: ALTCHOICE

## 2021-03-05 RX ORDER — BUMETANIDE 0.5 MG/1
TABLET ORAL DAILY
COMMUNITY
End: 2022-05-24

## 2021-03-05 RX ORDER — SODIUM CHLORIDE 9 MG/ML
20 INJECTION, SOLUTION INTRAVENOUS CONTINUOUS
Status: DISCONTINUED | OUTPATIENT
Start: 2021-03-05 | End: 2021-03-05 | Stop reason: HOSPADM

## 2021-03-05 RX ADMIN — FENTANYL CITRATE 50 MCG: 50 INJECTION, SOLUTION INTRAMUSCULAR; INTRAVENOUS at 10:20

## 2021-03-05 RX ADMIN — SODIUM CHLORIDE 20 ML/HR: 900 INJECTION, SOLUTION INTRAVENOUS at 10:08

## 2021-03-05 RX ADMIN — FENTANYL CITRATE 50 MCG: 50 INJECTION, SOLUTION INTRAMUSCULAR; INTRAVENOUS at 10:15

## 2021-03-05 RX ADMIN — MIDAZOLAM 1 MG: 1 INJECTION INTRAMUSCULAR; INTRAVENOUS at 10:20

## 2021-03-05 RX ADMIN — MIDAZOLAM 1 MG: 1 INJECTION INTRAMUSCULAR; INTRAVENOUS at 10:15

## 2021-03-05 RX ADMIN — CLINDAMYCIN PHOSPHATE 600 MG: 150 INJECTION, SOLUTION INTRAVENOUS at 10:09

## 2021-03-05 RX ADMIN — LIDOCAINE HYDROCHLORIDE,EPINEPHRINE BITARTRATE 400 MG: 20; .005 INJECTION, SOLUTION EPIDURAL; INFILTRATION; INTRACAUDAL; PERINEURAL at 10:18

## 2021-03-05 NOTE — PROGRESS NOTES
Verbal report given to Jeremiah Buchanan RN. Patient NAD, VSS and A&O x3. Patient able to maintain oral airway and does not need supplemental O2. Patient able to move all extremities appropriately. All questions answered.

## 2021-03-05 NOTE — DISCHARGE INSTRUCTIONS
DISCHARGE SUMMARY from Nurse:    Please resume your home medications as prescribed. EXCEPT: Eliquis, hold Eliquis  For 48 hours after discharge. PATIENT INSTRUCTIONS:    After general anesthesia or intravenous sedation, for 24 hours or while taking prescription Narcotics:  · Limit your activities  · Do not drive and operate hazardous machinery  · Do not make important personal or business decisions  · Do  not drink alcoholic beverages  · If you have not urinated within 8 hours after discharge, please contact your surgeon on call. Report the following to your surgeon:  · Excessive pain, swelling, redness or odor of or around the surgical area  · Temperature over 100.5  · Nausea and vomiting lasting longer than 4 hours or if unable to take medications  · Any signs of decreased circulation or nerve impairment to extremity: change in color, persistent  numbness, tingling, coldness or increase pain  · Any questions    What to do at Home:  Recommended activity: Activity as tolerated and no driving for today. If you experience any of the following symptoms bleeding,swelling,acute pain or numbness, fever, please follow up with. *  Please give a list of your current medications to your Primary Care Provider. *  Please update this list whenever your medications are discontinued, doses are      changed, or new medications (including over-the-counter products) are added. *  Please carry medication information at all times in case of emergency situations. These are general instructions for a healthy lifestyle:    No smoking/ No tobacco products/ Avoid exposure to second hand smoke  Surgeon General's Warning:  Quitting smoking now greatly reduces serious risk to your health.     Obesity, smoking, and sedentary lifestyle greatly increases your risk for illness    A healthy diet, regular physical exercise & weight monitoring are important for maintaining a healthy lifestyle    You may be retaining fluid if you have a history of heart failure or if you experience any of the following symptoms:  Weight gain of 3 pounds or more overnight or 5 pounds in a week, increased swelling in our hands or feet or shortness of breath while lying flat in bed.  Please call your doctor as soon as you notice any of these symptoms; do not wait until your next office visit.        The discharge information has been reviewed with the patient.  The patient verbalized understanding.  Discharge medications reviewed with the patient and appropriate educational materials and side effects teaching were provided.  ___________________________________________________________________________________________________________________________________      DRAIN/PORT/CATHETER REMOVAL  DISCHARGE INSTRUCTIONS    General Information:     Your doctor has ordered for us to remove your drain, port, or catheter. This could be that you do not need it anymore, it is not doing its job, your physician has decided on another plan for your treatment and/or it may need replacing.        Home Care Instructions:     You can resume your regular diet and medication regimen. Do not drink alcohol, drive, or make any important legal decisions in the next 24 hours. Do not lift anything heavier than a gallon of milk, or do anything strenuous for the next 24 hours. You will notice a dressing over the site of the removal. This dressing should stay in place until the site is healed. The dressing should be changed at least every 48 hours. You should change the dressing sooner if it becomes soiled or wet. The nurse who discharges you to home should review with you any wound care instructions. Resume your normal level of activity slowly. You may shower after 24 hours, but do not take a bath, swim or immerse yourself in water until the site has healed, and keep the dressing dry with plastic wrap while showering. The site may ooze for a couple of days. This drainage should lessen with  each passing day. Call If:     You should call your Physician and/or the Radiology Nurse if you have any bleeding other than a small spot on your bandage. Call if you have any signs of infection, fever, or increased pain at the site of the tube. Call if the oozing increases, if it changes color, or begins to have an odor. Follow-Up Instructions: Please see your ordering doctor as he/she has requested. To Reach Us: Interventional Radiology Lab @ [phone]. Patient armband removed and shredded  MyChart Activation    Thank you for requesting access to [employer]. Please follow the instructions below to securely access and download your online medical record. [employer] allows you to send messages to your doctor, view your test results, renew your prescriptions, schedule appointments, and more. How Do I Sign Up? 1. In your internet browser, go to [email or web address] 2. Click on the First Time User? Click Here link in the Sign In box. You will see the New Member Sign Up page. 3. Enter your [employer] Access Code exactly as it appears below. You will not need to use this code after youve completed the sign-up process. If you do not sign up before the expiration date, you must request a new code. MyChart Access Code: Activation code not generated  Current [employer] Status: Active (This is the date your [employer] access code will )    4. Enter the last four digits of your Social Security Number (xxxx) and Date of Birth (mm/dd/yyyy) as indicated and click Submit. You will be taken to the next sign-up page. 5. Create a InnoCCt ID. This will be your [employer] login ID and cannot be changed, so think of one that is secure and easy to remember. 6. Create a [employer] password. You can change your password at any time. 7. Enter your Password Reset Question and Answer. This can be used at a later time if you forget your password. 8. Enter your e-mail address.  You will receive e-mail notification when new information is available in [address] 9. Click Sign Up. You can now view and download portions of your medical record. 10. Click the Download Summary menu link to download a portable copy of your medical information. Additional Information    If you have questions, please visit the Frequently Asked Questions section of the [employer] website at [email or web address] Remember, [employer] is NOT to be used for urgent needs. For medical emergencies, dial 911.

## 2021-03-05 NOTE — PROCEDURES
RADIOLOGY POST PROCEDURE NOTE     March 5, 2021       3:11 PM     Preoperative Diagnosis:   Completion of chemotherapy    Postoperative Diagnosis:  Same. :  DIA Wiggins    Assistant:  Dr. Humble Barrera present for critical portions of the procedure    Type of Anesthesia: 1% lidocaine with epinephrine, moderate sedation    Procedure/Description: Image guided Mediport removal    Findings:   Successful Mediport removal.    Estimated blood Loss:  Minimal    Specimen Removed:   no    Blood transfusions:  None. Implants:  None.     Complications: None    Condition: Stable    Discharge Plan:  discharge home In 1 hour    DIA Kent

## 2021-03-05 NOTE — ROUTINE PROCESS
A+Ox4, ambulatory without difficulty, denied any complaints. Left upper chest dressing intact, no bleeding or swelling. Discharge information reviewed. Escorted to car, tot her  for transport. AVS Discharge instructions reviewed with patient and copy given to patient. All questions answered. Patient verbalized understanding to all discharge instructions. PIV removed. Procedural site within normal limits. No hematoma or bleeding noted from procedural and PIV site. No pain noted at discharge. Patient discharged with support person in stable condition. Escorted out to vehicle for transport home.

## 2021-03-05 NOTE — H&P
History and Physical    Patient: Jacek Suh           Sex: female       DOA: 3/5/2021    YOB: 1960      Age:  63 y.o.     LOS:  LOS: 0 days        HPI:     Jacek Suh is a 63 y.o. female who has a history of breast cancer and has completed chemotherapy here for a mediport removal with moderate sedation. Past Medical History:   Diagnosis Date    Cancer [facility]) 2000    Right breast ca     [facility] cancer ([facility]     Congestive heart failure, unspecified     Gout     H/O total mastectomy of right breast     Heart disease     Hx: UTI (urinary tract infection)     Hypercholesterolemia     Iron deficiency anemia     Nausea & vomiting     [facility] 12/17/15    Renal mass, left     Thyroid cancer ([facility]     2017     Past Surgical History:   Procedure Laterality Date    HX CHOLECYSTECTOMY      HX MASTECTOMY Right     HX NEPHRECTOMY Right 3/22/16    Partial x2, Dr. Duyen Quinonez, [facility]    HX ORTHOPAEDIC  04/19/2017    Trigger finger release R hand    HX RENAL BIOPSY Right 12/17/15    Ct guided bx, Dr. Cesar Francis, [facility]    HX THYROIDECTOMY  6/20/2018    IR [address] CVC W PORT OVER 5 YEARS  10/15/2019     Family History   Problem Relation Age of Onset    Hypertension Father     Heart Attack Father     Heart Failure Father     Diabetes Father      Social History     Socioeconomic History    Marital status:      Spouse name: Not on file    Number of children: Not on file    Years of education: Not on file    Highest education level: Not on file   Tobacco Use    Smoking status: Never Smoker    Smokeless tobacco: Never Used   Substance and Sexual Activity    Alcohol use: No    Drug use: No     Prior to Admission medications    Medication Sig Start Date End Date Taking? Authorizing Provider   lansoprazole (PREVACID) 30 mg capsule Take 1 Cap by mouth daily.  2/18/21   Marcia Quach Shaw Mar MD   levothyroxine (SYNTHROID) 112 mcg tablet TAKE 1 TABLET BY MOUTH EVERY DAY BEFORE BREAKFAST 2/17/21   Ciara Watts MD   atorvastatin (LIPITOR) 20 mg tablet Take 1 Tab by mouth daily. 2/9/21   Ciara Watts MD   allopurinoL (ZYLOPRIM) 100 mg tablet TAKE 1 TABLET BY MOUTH EVERY DAY 4/29/20   Provider, Historical   Entresto 49-51 mg tab tablet TAKE 1 TABLET BY MOUTH TWICE A DAY 5/28/20   Provider, Historical   DULoxetine (CYMBALTA) 30 mg capsule TAKE 1 CAPSULE BY MOUTH EVERY DAY 5/28/20   Provider, Historical   ondansetron (ZOFRAN ODT) 8 mg disintegrating tablet Take 1 Tab by mouth every eight (8) hours as needed for Nausea or Vomiting. 1/16/20   Jeannie Jones MD   calcitRIOL (ROCALTROL) 0.25 mcg capsule Take 0.25 mcg by mouth every Monday, Wednesday, Friday. Provider, Historical   ergocalciferol (VITAMIN D2) 50,000 unit capsule Take 50,000 Units by mouth every thirty (30) days. Provider, Historical   COREG 25 mg tablet Take 25 mg by mouth two (2) times a day. 7/21/15   Provider, Historical     Allergies   Allergen Reactions    Codeine Swelling, Other (comments) and Not Reported This Time     Throat Swelling    Penicillins Other (comments) and Angioedema     N/V, swelling    Tramadol Swelling    Sulfa (Sulfonamide Antibiotics) Other (comments)     itching    Sulfur Hives       Physical Exam:      There were no vitals taken for this visit. Physical Exam:  Mallampati 2 ASA 3  General: A&O x 4, NAD  Heart: RRR  Lungs: normal work of breathing on room air    Labs Reviewed: All lab results for the last 24 hours reviewed. Assessment/Plan     Active Problems:    * No active hospital problems. *      The patient is an appropriate candidate to undergo the planned procedure and sedation.     DIA Rios

## 2021-03-12 ENCOUNTER — HOSPITAL ENCOUNTER (OUTPATIENT)
Dept: INTERVENTIONAL RADIOLOGY/VASCULAR | Age: 61
Discharge: HOME | End: 2021-03-12
Payer: COMMERCIAL

## 2021-03-12 PROCEDURE — 99211 OFF/OP EST MAY X REQ PHY/QHP: CPT

## 2021-03-12 NOTE — PROCEDURES
MEDIPORT SITE CHECK    INDICATION: Mediport removal 3/05/21    SUBJECTIVE:  Patient states that her left chest mediport removal site has been without exudate, redness, or swelling. Denies new nausea, vomiting, fever, chills, cough, or chest pain since procedure. Endorses \"a little soreness\" at the left chest site    OBJECTIVE:  Mediport removal site non tender to palpation with dermabond still covering incision site. No erythema, edema, exudate, or ecchymosis surrounding the sites    IMPRESSION:  Mediport removal from the left chest appears to be healing well. Patient given care instructions and told to call our department with any further questions or concerns.     Thank you,  DIA Ndiaye

## 2021-03-23 ENCOUNTER — APPOINTMENT (OUTPATIENT)
Dept: GENERAL RADIOLOGY | Age: 61
End: 2021-03-23
Attending: EMERGENCY MEDICINE
Payer: COMMERCIAL

## 2021-03-23 ENCOUNTER — HOSPITAL ENCOUNTER (EMERGENCY)
Age: 61
Discharge: HOME | End: 2021-03-23
Attending: EMERGENCY MEDICINE
Payer: COMMERCIAL

## 2021-03-23 VITALS
RESPIRATION RATE: 18 BRPM | OXYGEN SATURATION: 99 % | TEMPERATURE: 97.6 F | HEIGHT: 66 IN | BODY MASS INDEX: 28.12 KG/M2 | WEIGHT: 175 LBS | HEART RATE: 71 BPM | SYSTOLIC BLOOD PRESSURE: 135 MMHG | DIASTOLIC BLOOD PRESSURE: 70 MMHG

## 2021-03-23 DIAGNOSIS — M79.672 LEFT FOOT PAIN: Primary | ICD-10-CM

## 2021-03-23 PROCEDURE — 74011250637 HC RX REV CODE- 250/637: Performed by: PHYSICIAN ASSISTANT

## 2021-03-23 PROCEDURE — 99284 EMERGENCY DEPT VISIT MOD MDM: CPT

## 2021-03-23 PROCEDURE — 73630 X-RAY EXAM OF FOOT: CPT

## 2021-03-23 RX ORDER — ACETAMINOPHEN 500 MG
1000 TABLET ORAL
Status: COMPLETED | OUTPATIENT
Start: 2021-03-23 | End: 2021-03-23

## 2021-03-23 RX ADMIN — ACETAMINOPHEN 1000 MG: 500 TABLET ORAL at 12:35

## 2021-03-23 NOTE — ED PROVIDER NOTES
EMERGENCY DEPARTMENT HISTORY AND PHYSICAL EXAM    11:57 AM      Date: 3/23/2021  Patient Name: Tone Ren    History of Presenting Illness     Chief Complaint   Patient presents with    Foot Injury     History Provided By: Patient    Additional History (Context): Tone Ren is a 63 y.o. female with hx of atrial fibrillation, thyroid d/o, and other noted PMH who presents with complaint of left foot pain x 9 days. Patient notes a metal door was accidentally slammed onto her foot at that time. She was evaluated at urgent care that same day, x-ray was negative. Notes today she twisted her foot and felt significant pain. Notes inability to bear weight since incident. Denies numbness or tingling, calf pain or swelling. Notes she has been taking Tylenol for the symptoms with mild relief. PCP: Molly Abdalla MD    Current Outpatient Medications   Medication Sig Dispense Refill    apixaban (Eliquis) 5 mg tablet Take 5 mg by mouth two (2) times a day.  bumetanide (BUMEX) 0.5 mg tablet Take  by mouth daily.  lansoprazole (PREVACID) 30 mg capsule Take 1 Cap by mouth daily. 90 Cap 1    levothyroxine (SYNTHROID) 112 mcg tablet TAKE 1 TABLET BY MOUTH EVERY DAY BEFORE BREAKFAST 90 Tab 0    atorvastatin (LIPITOR) 20 mg tablet Take 1 Tab by mouth daily. 90 Tab 3    allopurinoL (ZYLOPRIM) 100 mg tablet TAKE 1 TABLET BY MOUTH EVERY DAY      ondansetron (ZOFRAN ODT) 8 mg disintegrating tablet Take 1 Tab by mouth every eight (8) hours as needed for Nausea or Vomiting. 20 Tab 0    calcitRIOL (ROCALTROL) 0.25 mcg capsule Take 0.25 mcg by mouth every Monday, Wednesday, Friday.  COREG 25 mg tablet Take 25 mg by mouth two (2) times a day.   0       Past History     Past Medical History:  Past Medical History:   Diagnosis Date    [facility]     Cancer ([facility] 2000    Right breast ca     Colon cancer ([facility]     Congestive heart failure, unspecified     Gout     H/O total mastectomy of right breast  Heart disease     Hx: UTI (urinary tract infection)     Hypercholesterolemia     Iron deficiency anemia     Nausea & vomiting     Renal cell carcinoma of left kidney ([facility] 12/17/15    Renal mass, left     Thyroid cancer ([facility]     2017       Past Surgical History:  Past Surgical History:   Procedure Laterality Date    HX CHOLECYSTECTOMY      HX MASTECTOMY Right     HX NEPHRECTOMY Right 3/22/16    Partial x[facility], Dr[facility] Si[facility]    HX ORTHOPAEDIC  04/19/2017    Trigger finger release R hand    HX RENAL BIOPSY Right 12/17/15    Ct guided bx, Dr. Juan Rodrigues, [facility]    HX THYROIDECTOMY  6/20/2018    IR INSERT TUNL CVC W PORT OVER 5 YEARS  10/15/2019    IR REMOVE TUNL CVAD W PORT/PUMP  3/5/2021       Family History:  Family History   Problem Relation Age of Onset    Hypertension Father     Heart Attack Father     Heart Failure Father     Diabetes Father        Social History:  Social History     Tobacco Use    Smoking status: Never Smoker    Smokeless tobacco: Never Used   Substance Use Topics    Alcohol use: No    Drug use: No       Allergies: Allergies   Allergen Reactions    Codeine Swelling, Other (comments) and Not Reported This Time     Throat Swelling    Penicillins Other (comments) and Angioedema     N/V, swelling    Tramadol Swelling    Sulfa (Sulfonamide Antibiotics) Other (comments)     itching    Sulfur Hives         Review of Systems       Review of Systems   Constitutional: Negative for chills and fever. Respiratory: Negative for shortness of breath. Cardiovascular: Negative for chest pain. Gastrointestinal: Negative for abdominal pain, nausea and vomiting. Musculoskeletal: Positive for arthralgias, joint swelling and myalgias. Skin: Positive for color change. Negative for rash. Neurological: Negative for weakness. All other systems reviewed and are negative.         Physical Exam     Visit Vitals  /70   Pulse 71   Temp 97.6 °F (36.4 °C)   Resp 18   Ht 5' 6\" (1.676 m)   Wt 79.4 kg (175 lb)   SpO2 99%   BMI 28.25 kg/m²         Physical Exam  Vitals signs and nursing note reviewed. Constitutional:       General: She is not in acute distress. Appearance: Normal appearance. She is well-developed. She is not ill-appearing, toxic-appearing or diaphoretic. HENT:      Head: Normocephalic and atraumatic. Neck:      Musculoskeletal: Normal range of motion and neck supple. Cardiovascular:      Rate and Rhythm: Normal rate and regular rhythm. Heart sounds: Normal heart sounds. No murmur. No friction rub. No gallop. Pulmonary:      Effort: Pulmonary effort is normal. No respiratory distress. Breath sounds: Normal breath sounds. No wheezing or rales. Musculoskeletal: Normal range of motion. Left ankle: Normal. Achilles tendon normal.      Left foot: Normal capillary refill. Bony tenderness (lateral aspect of foot ) and swelling (mild diffuse edema dorsum of foot) present. No crepitus. Feet:       Comments: Dorsalis pedis 2+, sensation intact throughout digits    Skin:     General: Skin is warm. Findings: No rash. Neurological:      Mental Status: She is alert. Diagnostic Study Results     Labs -  No results found for this or any previous visit (from the past 12 hour(s)). Radiologic Studies -   XR FOOT LT MIN 3 V   Final Result   Soft tissue swelling. No fracture identified. Medical Decision Making   I am the first provider for this patient. I reviewed the vital signs, available nursing notes, past medical history, past surgical history, family history and social history. Vital Signs-Reviewed the patient's vital signs. Records Reviewed: Nursing Notes and Old Medical Records (Time of Review: 11:57 AM)    ED Course: Progress Notes, Reevaluation, and Consults:  12:57 PM  Reviewed results with patient. Discussed need for close outpatient follow-up with orthopedic or podiatrist this week for reassessment. Discussed strict return precautions, including calf swelling, weakness, or any other medical concerns. Provider Notes (Medical Decision Making): 28-year-old female who presents to the ED due to left foot pain after injury. Extremity neurovascularly intact. Full range of motion of joint and digits. X-ray without acute process. Will discharge with postop shoe, crutches. Will refer to orthopedic and podiatry for close outpatient follow-up for further assessment. Strict return precautions provided. Diagnosis     Clinical Impression:   1. Left foot pain        Disposition: home     Follow-up Information     Follow up With Specialties Details [facility] EMERGENCY DEPT Emergency Medicine  If symptoms worsen [address] [facility]  [phone]    [facility] - [facility]  Schedule an appointment as soon as possible for a visit   [address] [place]     [facility] Schedule an appointment as soon as possible for a visit  podiatry [address]  [phone]             Discharge Medication List as of 3/23/2021 12:27 PM      CONTINUE these medications which have NOT CHANGED    Details   apixaban (Eliquis) 5 mg tablet Take 5 mg by mouth two (2) times a day., Historical Med      bumetanide (BUMEX) 0.5 mg tablet Take  by mouth daily. , Historical Med      lansoprazole (PREVACID) 30 mg capsule Take 1 Cap by mouth daily. , Normal, Disp-90 Cap, R-1      levothyroxine (SYNTHROID) 112 mcg tablet TAKE 1 TABLET BY MOUTH EVERY DAY BEFORE BREAKFAST, Normal, Disp-90 Tab, R-0      atorvastatin (LIPITOR) 20 mg tablet Take 1 Tab by mouth daily. , Normal, Disp-90 Tab, R-3      allopurinoL (ZYLOPRIM) 100 mg tablet TAKE 1 TABLET BY MOUTH EVERY DAY, Historical Med      ondansetron (ZOFRAN ODT) 8 mg disintegrating tablet Take 1 Tab by mouth every eight (8) hours as needed for Nausea or Vomiting., Normal, Disp-20 Tab, R-0      calcitRIOL (ROCALTROL) 0.25 mcg capsule Take 0.25 mcg by mouth every Monday, Wednesday, Friday., Historical Med      COREG 25 mg tablet Take 25 mg by mouth two (2) times a day., Historical Med, R-0             Dictation disclaimer:  Please note that this dictation was completed with [employer], the computer voice recognition software. Quite often unanticipated grammatical, syntax, homophones, and other interpretive errors are inadvertently transcribed by the computer software. Please disregard these errors. Please excuse any errors that have escaped final proofreading.

## 2021-03-23 NOTE — DISCHARGE INSTRUCTIONS
Elevate and ice the extremity. Follow-up with the orthopedic physician or podiatrist for reassessment within a week. Bring the results from this visit with you for their review. Return to the ED immediately for any new, worsening, or persistent symptoms, including numbness, weakness, increased bruising, or any other medical concerns.

## 2021-03-23 NOTE — ED TRIAGE NOTES
Patient states injury to left foot that occurred one week ago Sunday after metal door was slammed into foot. Patient states having xray at [facility] urgent care on Sunday and was advised of no fracture to left foot. She states \"turning\" her left foot approximately 15 minutes ago. She states inability to bear weight on left foot related to pain complaint. Bruising noted across toes 2,3, and 4. Abrasion noted to dorsum of foot. Moderate swelling noted to foot and ankle.

## 2021-03-24 ENCOUNTER — OFFICE VISIT (OUTPATIENT)
Dept: ORTHOPEDIC SURGERY | Age: 61
End: 2021-03-24
Payer: COMMERCIAL

## 2021-03-24 VITALS
BODY MASS INDEX: 28.12 KG/M2 | HEIGHT: 66 IN | TEMPERATURE: 97.5 F | OXYGEN SATURATION: 97 % | WEIGHT: 175 LBS | HEART RATE: 82 BPM

## 2021-03-24 DIAGNOSIS — S90.32XA CONTUSION OF LEFT FOOT, INITIAL ENCOUNTER: ICD-10-CM

## 2021-03-24 DIAGNOSIS — S90.32XA CONTUSION OF LEFT FOOT, INITIAL ENCOUNTER: Primary | ICD-10-CM

## 2021-03-24 PROCEDURE — 99203 OFFICE O/P NEW LOW 30 MIN: CPT | Performed by: ORTHOPAEDIC SURGERY

## 2021-03-24 NOTE — LETTER
3/24/2021 Patient: Hiren Sotomayor YOB: 1960 Date [facility] Visit: 3[facility]24/2021 [facility] [address] [id number] [place] [address] [address] [employer] Dear Yeny Mcclure MD, Thank you for referring Ms. Vanessa Avitia to [address] [place] [address] for evaluation. My notes for this consultation are attached. If you have questions, please do not hesitate to call me. I look forward to following your patient along with you.  
 
 
Sincerely, 
 
Page Gracia MD

## 2021-03-24 NOTE — PROGRESS NOTES
AMBULATORY PROGRESS NOTE      Patient: Jena De La Rosa             MRN: [id number]     SSN: [id number] Body mass index is 28.25 kg/m². YOB: 1960     AGE: 63 y.o. EX: female    PCP: Phyllistine Dancer, MD       IMPRESSION //  DIAGNOSIS AND TREATMENT PLAN        Jena De La Rosa has contusion, left foot dorsal foot contusion occurred last week March 14, 2021. A few days ago, 1 day ago, she was turning and and felt a pop on the dorsal part of her foot had worsening pain discomfort, so she went to emergency room on this date, 3/23/2021. Repeat x-rays of her left foot, showed dorsal soft tissue swelling, no definite fracture to her foot. Because of her worsening tenderness, because felt a pop on dorsal forefoot, and worsening tenderness to the third fourth metatarsals, conditions MRI to rule out an occult fracture in this region. Put in a cam walker boot modified activities. DIAGNOSES  1. Contusion of left foot, initial encounter        Orders Placed This Encounter    Generic Supply Order     CAM walker boot, left    MRI FOOT LT WO CONT     Standing Status:   Future     Standing Expiration Date:   4/24/2022     Order Specific Question:   Region of foot     Answer: Fore     Order Specific Question:   Reason for Exam     Answer:   assess dorsal foot        PLAN:      1. MRI Foot: assess dorsal foot  2. DME: CAM walker boot      RTO-  After MRI left forefoot/foot    Jena De La Rosa  expresses understanding of the diagnosis, treatment plan, and all of their proposed questions were answered to their satisfaction. Patient education has been provided re the diagnoses. Jena De La Rosa may have a reminder for a \"due or due soon\" health maintenance. I have asked that she contact her primary care provider for follow-up on this health maintenance.         HPI // [address] A 63 y.o. female who is a/an  new patient , presenting to my outpatient office for evaluation of  the following chief complaint(s):     No chief complaint on file. Patient presents with left foot pain, following an incident when picking up furniture at [employer] March 14, 2021. Patient recalls a metal door accidentally hitting her foot. Patient reports moderate pain afterwards and discoloration to the skin. Patient was seen at a [facility] center and received normal readings on an XR. Pain then subsided. Patient reports twisting 1d ago to  her purse in which she heard a pop in her foot. Patient states that she was unable to ambulate afterwards and visited [facility] ED. Patient has severe pain with full weight-bearing. Patient is currently on Eliquis for A-fib. Patient has post-chemo neuropathy. Visit Vitals  Pulse 82   Temp 97.5 °F (36.4 °C)   Ht 5' 6\" (1.676 m)   Wt 175 lb (79.4 kg)   SpO2 97%   BMI 28.25 kg/m²       Appearance: Alert, well appearing and pleasant patient who is in no distress, oriented to person, place/time, and who follows commands. This patient is accompanied in the examination room by her  spouse. There is signs of: no dementia  Psychiatric: Affect/mood are appropriate. Speech normal in context and clarity, memory intact grossly, no involuntary movements - tremors. Patient arrives to office via: with assistive device: manuel ARCOS (2): Head normocephalic & atraumatic. Both pupils are round     Eye: EOM are intact // Neck: ROM WNL  //  Hearings Intact   Respiratory: Breathing non labored     ANKLE/FOOT left    Gait: slow and uses assistive device   Tenderness: mild to    Cutaneous: mild to moderate swelling over dorsal central midfoot. Ecchymotic bruising to #2-4 toes. 4-5cm well-healed traumatic laceration scar.  Extensive discoloration bruise to lateral calcaneal wall region  Joint Motion: Ankle and hindfoot joints have motion that are WNL//, limited range of motion is noted #234 toes at the MTP joints as this when she flexes her toe, does cause her discomfort   she flexes her forefoot, does cause her discomfort to her 234 metatarsals. Joint / Tendon Stability: No Ankle or Subtalar instability or joint laxity. No peroneal sublux ability or dislocation  Alignment: neutral Hindfoot,    Neuro Motor/Sensory: NL/NL  Vascular: NL foot/ankle pulses,   Lymphatics: No extremity lymphedema, No calf swelling, no tenderness to calf muscles. CHART REVIEW     Vania Bautista has been experiencing pain and discomfort confirmed as outlined in the pain assessment outlined below.  was reviewed by Flex Beltran MD on 3/24/2021. Pain Assessment  3/24/2021   Location of Pain Foot   Location Modifiers Left   Severity of Pain 8   Quality of Pain Sharp   Frequency of Pain Intermittent   Aggravating Factors (No Data)   Aggravating Factors Comment putting weight on foot   Relieving Factors Rest;Elevation        Vania Bautista  has a past medical history of A-fib ([facility] Cancer ([facility] (2000), Colon cancer ([address] Congestive heart failure, unspecified, Gout, H/O total mastectomy of right breast, Heart disease, UTI (urinary tract infection), Hypercholesterolemia, Iron deficiency anemia, Nausea & vomiting, Renal cell carcinoma of left kidney ([address] (12/17/15), Renal mass, left, and Thyroid cancer ([address]      Patients is employed at:         Past Medical History:   Diagnosis Date    A-fib [facility])     Cancer [facility]) 2000    Right breast ca     Colon cancer ([address]     Congestive heart failure, unspecified     Gout     H/O total mastectomy of right breast     Heart disease     Hx: UTI (urinary tract infection)     Hypercholesterolemia     Iron deficiency anemia     Nausea & vomiting     Renal cell carcinoma of left kidney ([address] 12/17/15    Renal mass, left     Thyroid cancer ([address]     2017     Past Surgical History:   Procedure Laterality Date    HX CHOLECYSTECTOMY      HX MASTECTOMY Right     HX NEPHRECTOMY Right 3/22/16 Partial x2, Dr. Peter Jarrett, [facility]    HX ORTHOPAEDIC  04/19/2017    Trigger finger release R hand    HX RENAL BIOPSY Right 12/17/15    Ct guided bx, Dr. Lizzy Ricardo, [facility]    HX THYROIDECTOMY  6/20/2018    IR INSERT TUNL CVC W PORT OVER 5 YEARS  10/15/2019    IR REMOVE TUNL CVAD W PORT/PUMP  3/5/2021     Current Outpatient Medications   Medication Sig    apixaban (Eliquis) 5 mg tablet Take 5 mg by mouth two (2) times a day.  bumetanide (BUMEX) 0.5 mg tablet Take  by mouth daily.  lansoprazole (PREVACID) 30 mg capsule Take 1 Cap by mouth daily.  levothyroxine (SYNTHROID) 112 mcg tablet TAKE 1 TABLET BY MOUTH EVERY DAY BEFORE BREAKFAST    atorvastatin (LIPITOR) 20 mg tablet Take 1 Tab by mouth daily.  allopurinoL (ZYLOPRIM) 100 mg tablet TAKE 1 TABLET BY MOUTH EVERY DAY    ondansetron (ZOFRAN ODT) 8 mg disintegrating tablet Take 1 Tab by mouth every eight (8) hours as needed for Nausea or Vomiting.  calcitRIOL (ROCALTROL) 0.25 mcg capsule Take 0.25 mcg by mouth every Monday, Wednesday, Friday.  COREG 25 mg tablet Take 25 mg by mouth two (2) times a day. No current facility-administered medications for this visit.       Allergies   Allergen Reactions    Codeine Swelling, Other (comments) and Not Reported This Time     Throat Swelling    Penicillins Other (comments) and Angioedema     N/V, swelling    Tramadol Swelling    Sulfa (Sulfonamide Antibiotics) Other (comments)     itching    Sulfur Hives     Social History     Occupational History    Not on file   Tobacco Use    Smoking status: Never Smoker    Smokeless tobacco: Never Used   Substance and Sexual Activity    Alcohol use: No    Drug use: No    Sexual activity: Not on file     Family History   Problem Relation Age of Onset    Hypertension Father     Heart Attack Father     Heart Failure Father     Diabetes Father         DIAGNOSTIC LAB DATA      Lab Results   Component Value Date/Time    Hemoglobin A1c 6.0 (H) 10/29/2020 09:16 AM Hemoglobin A1c 5.7 (H) 06/07/2019 01:44 PM    Hemoglobin A1c 5.8 (H) 02/21/2017 08:35 AM    //   Lab Results   Component Value Date/Time    Glucose 107 (H) 03/05/2021 09:10 AM    Glucose (POC) 189 (H) 01/16/2020 11:13 AM        Lab Results   Component Value Date/Time    Hemoglobin A1c (POC) 6.2 07/31/2015 12:27 PM         Lab Results   Component Value Date/Time    Vitamin D 25-Hydroxy 32.3 10/29/2020 09:16 AM         REVIEW OF SYSTEMS : 3/24/2021  ALL BELOW ARE Negative except : SEE HPI     All other systems reviewed and are negative. 12 point review of systems otherwise negative unless noted in HPI. DIAGNOSTIC IMAGING         XR Results (most recent):  Results from Hospital Encounter encounter on 03/23/21   XR FOOT LT MIN 3 V    Narrative XR FOOT LT MIN 3 V: 3/23/2021 11:32 AM    CLINICAL INFORMATION  foot/ ankle injury. COMPARISON  None. TECHNIQUE  3 views of the left foot    FINDINGS:  No fracture or destructive osseous lesion. Midfoot alignment preserved. Dorsal midfoot soft tissue swelling. Mild plantar heel spur. Mild hallux valgus. Soft tissue bunion at the medial first metatarsal head with underlying chronic  cortical irregularity. Mild first MTP and hallux sesamoid interval osteoarthritis. Impression Soft tissue swelling. No fracture identified. I have reviewed the results/images of the above study. An electronic signature was used to authenticate this note. Lester Bailey MD  3/24/2021  11:01 AM      Disclaimer: Sections of this note are dictated using utilizing voice recognition software, which may have resulted in some phonetic based errors in grammar and contents. Even though attempts were made to correct all the mistakes, some may have been missed, and remained in the body of the document. If questions arise, please contact our department.      Lester Bailey MD  3/24/2021  11:01 AM          Written by Iman Simon ScribeKick, as dictated by Duyen Jenkins MD.   I, Dr. Duyen Jenkins, confirm that all documentation is accurate.

## 2021-04-17 ENCOUNTER — HOSPITAL ENCOUNTER (OUTPATIENT)
Age: 61
Discharge: HOME | End: 2021-04-17
Attending: ORTHOPAEDIC SURGERY
Payer: COMMERCIAL

## 2021-04-17 PROCEDURE — 73718 MRI LOWER EXTREMITY W/O DYE: CPT

## 2021-06-14 ENCOUNTER — HOSPITAL ENCOUNTER (OUTPATIENT)
Dept: LAB | Age: 61
Discharge: HOME | End: 2021-06-14
Payer: COMMERCIAL

## 2021-06-14 ENCOUNTER — APPOINTMENT (OUTPATIENT)
Dept: INTERNAL MEDICINE CLINIC | Age: 61
End: 2021-06-14

## 2021-06-14 DIAGNOSIS — E11.22 CONTROLLED TYPE 2 DIABETES MELLITUS WITH STAGE 4 CHRONIC KIDNEY DISEASE, WITHOUT LONG-TERM CURRENT USE OF INSULIN (HCC): ICD-10-CM

## 2021-06-14 DIAGNOSIS — N18.4 CONTROLLED TYPE 2 DIABETES MELLITUS WITH STAGE 4 CHRONIC KIDNEY DISEASE, WITHOUT LONG-TERM CURRENT USE OF INSULIN (HCC): ICD-10-CM

## 2021-06-14 DIAGNOSIS — C50.911 MALIGNANT NEOPLASM OF RIGHT FEMALE BREAST, UNSPECIFIED ESTROGEN RECEPTOR STATUS, UNSPECIFIED SITE OF BREAST (HCC): ICD-10-CM

## 2021-06-14 DIAGNOSIS — I10 HYPERTENSION, UNSPECIFIED TYPE: ICD-10-CM

## 2021-06-14 DIAGNOSIS — M10.9 GOUT, UNSPECIFIED CAUSE, UNSPECIFIED CHRONICITY, UNSPECIFIED SITE: ICD-10-CM

## 2021-06-14 DIAGNOSIS — E03.9 ACQUIRED HYPOTHYROIDISM: ICD-10-CM

## 2021-06-14 DIAGNOSIS — I50.22 CHRONIC SYSTOLIC CONGESTIVE HEART FAILURE (HCC): ICD-10-CM

## 2021-06-14 DIAGNOSIS — N18.4 CHRONIC KIDNEY DISEASE (CKD) STAGE G4/A1, SEVERELY DECREASED GLOMERULAR FILTRATION RATE (GFR) BETWEEN 15-29 ML/MIN/1.73 SQUARE METER AND ALBUMINURIA CREATININE RATIO LESS THAN 30 MG/G (HCC): ICD-10-CM

## 2021-06-14 DIAGNOSIS — C18.6 MALIGNANT TUMOR OF DESCENDING COLON (HCC): ICD-10-CM

## 2021-06-14 DIAGNOSIS — C64.2 MALIGNANT NEOPLASM OF LEFT KIDNEY (HCC): ICD-10-CM

## 2021-06-14 LAB
ALBUMIN SERPL-MCNC: 4.1 G/DL (ref 3.4–5)
ALBUMIN/GLOB SERPL: 1.4 {RATIO} (ref 0.8–1.7)
ALP SERPL-CCNC: 274 U/L (ref 45–117)
ALT SERPL-CCNC: 44 U/L (ref 13–56)
ANION GAP SERPL CALC-SCNC: 7 MMOL/L (ref 3–18)
AST SERPL-CCNC: 29 U/L (ref 10–38)
BASOPHILS # BLD: 0.1 K/UL (ref 0–0.1)
BASOPHILS NFR BLD: 1 % (ref 0–2)
BILIRUB SERPL-MCNC: 0.6 MG/DL (ref 0.2–1)
BUN SERPL-MCNC: 55 MG/DL (ref 7–18)
BUN/CREAT SERPL: 20 (ref 12–20)
CALCIUM SERPL-MCNC: 9.8 MG/DL (ref 8.5–10.1)
CHLORIDE SERPL-SCNC: 107 MMOL/L (ref 100–111)
CHOLEST SERPL-MCNC: 252 MG/DL
CO2 SERPL-SCNC: 28 MMOL/L (ref 21–32)
CREAT SERPL-MCNC: 2.74 MG/DL (ref 0.6–1.3)
CREAT UR-MCNC: 156 MG/DL (ref 30–125)
DIFFERENTIAL METHOD BLD: ABNORMAL
EOSINOPHIL # BLD: 0 K/UL (ref 0–0.4)
EOSINOPHIL NFR BLD: 0 % (ref 0–5)
ERYTHROCYTE [DISTWIDTH] IN BLOOD BY AUTOMATED COUNT: 16.8 % (ref 11.6–14.5)
GLOBULIN SER CALC-MCNC: 3 G/DL (ref 2–4)
GLUCOSE SERPL-MCNC: 143 MG/DL (ref 74–99)
HBA1C MFR BLD: 6 % (ref 4.2–5.6)
HCT VFR BLD AUTO: 41.5 % (ref 35–45)
HDLC SERPL-MCNC: 48 MG/DL (ref 40–60)
HDLC SERPL: 5.3 {RATIO} (ref 0–5)
HGB BLD-MCNC: 12.7 G/DL (ref 12–16)
LDLC SERPL CALC-MCNC: 186.4 MG/DL (ref 0–100)
LIPID PROFILE,FLP: ABNORMAL
LYMPHOCYTES # BLD: 0.8 K/UL (ref 0.9–3.6)
LYMPHOCYTES NFR BLD: 7 % (ref 21–52)
MCH RBC QN AUTO: 28 PG (ref 24–34)
MCHC RBC AUTO-ENTMCNC: 30.6 G/DL (ref 31–37)
MCV RBC AUTO: 91.4 FL (ref 74–97)
MICROALBUMIN UR-MCNC: 8.2 MG/DL (ref 0–3)
MICROALBUMIN/CREAT UR-RTO: 53 MG/G (ref 0–30)
MONOCYTES # BLD: 0.2 K/UL (ref 0.05–1.2)
MONOCYTES NFR BLD: 2 % (ref 3–10)
NEUTS SEG # BLD: 10.2 K/UL (ref 1.8–8)
NEUTS SEG NFR BLD: 90 % (ref 40–73)
PLATELET # BLD AUTO: 213 K/UL (ref 135–420)
PMV BLD AUTO: 11 FL (ref 9.2–11.8)
POTASSIUM SERPL-SCNC: 3.9 MMOL/L (ref 3.5–5.5)
PROT SERPL-MCNC: 7.1 G/DL (ref 6.4–8.2)
RBC # BLD AUTO: 4.54 M/UL (ref 4.2–5.3)
SODIUM SERPL-SCNC: 142 MMOL/L (ref 136–145)
TRIGL SERPL-MCNC: 88 MG/DL (ref ?–150)
URATE SERPL-MCNC: 8.8 MG/DL (ref 2.6–7.2)
VLDLC SERPL CALC-MCNC: 17.6 MG/DL
WBC # BLD AUTO: 11.3 K/UL (ref 4.6–13.2)

## 2021-06-14 PROCEDURE — 80053 COMPREHEN METABOLIC PANEL: CPT

## 2021-06-14 PROCEDURE — 83036 HEMOGLOBIN GLYCOSYLATED A1C: CPT

## 2021-06-14 PROCEDURE — 80061 LIPID PANEL: CPT

## 2021-06-14 PROCEDURE — 85025 COMPLETE CBC W/AUTO DIFF WBC: CPT

## 2021-06-14 PROCEDURE — 82043 UR ALBUMIN QUANTITATIVE: CPT

## 2021-06-14 PROCEDURE — 84550 ASSAY OF BLOOD/URIC ACID: CPT

## 2021-06-16 NOTE — PROGRESS NOTES
I will discuss her results with her at her upcoming appointment. Her A1c is 6. It is unchanged. Her creatinine is 2.74. Her alkaline phosphatase level is 274, down from 324. Her total cholesterol is 252. Triglycerides are 88. HDL is 48. Her LDL is up to 186 from 130 in October. Her uric acid level is high at 8.8, up from 6.1 seven months ago. Her CBC shows a high percentage of neutrophils of 9%. Her lymphocyte percentage is low at 7%. Her monocyte percentage is low at 2%. These findings are roughly unchanged from a year ago.     Dr. Hemalatha Reyes  [facility], [address] [place] [address], [address] [place], [place] [address] [address]  Phone: [phone]  Fax: [phone]

## 2021-06-18 NOTE — PROGRESS NOTES
Ina Saldana presents today for No chief complaint on file. HTN 
DM Thyroid Coordination of Care: 1. Have you been to the ER, urgent care clinic since your last visit? Hospitalized since your last visit? YES 
 
2. Have you seen or consulted any other health care providers outside of the [address] [place] [address] since your last visit? Include any pap smears or colon screening.  YES

## 2021-06-21 ENCOUNTER — OFFICE VISIT (OUTPATIENT)
Dept: INTERNAL MEDICINE CLINIC | Age: 61
End: 2021-06-21
Payer: COMMERCIAL

## 2021-06-21 VITALS
BODY MASS INDEX: 29.09 KG/M2 | DIASTOLIC BLOOD PRESSURE: 65 MMHG | HEIGHT: 66 IN | SYSTOLIC BLOOD PRESSURE: 118 MMHG | HEART RATE: 72 BPM | TEMPERATURE: 97.7 F | RESPIRATION RATE: 16 BRPM | WEIGHT: 181 LBS | OXYGEN SATURATION: 96 %

## 2021-06-21 DIAGNOSIS — N18.4 CHRONIC KIDNEY DISEASE (CKD) STAGE G4/A1, SEVERELY DECREASED GLOMERULAR FILTRATION RATE (GFR) BETWEEN 15-29 ML/MIN/1.73 SQUARE METER AND ALBUMINURIA CREATININE RATIO LESS THAN 30 MG/G (HCC): ICD-10-CM

## 2021-06-21 DIAGNOSIS — M10.9 GOUT, UNSPECIFIED CAUSE, UNSPECIFIED CHRONICITY, UNSPECIFIED SITE: ICD-10-CM

## 2021-06-21 DIAGNOSIS — L98.9 SKIN LESION: ICD-10-CM

## 2021-06-21 DIAGNOSIS — E78.00 HYPERCHOLESTEREMIA: Primary | ICD-10-CM

## 2021-06-21 DIAGNOSIS — Z85.3 HX: BREAST CANCER: ICD-10-CM

## 2021-06-21 DIAGNOSIS — E03.9 ACQUIRED HYPOTHYROIDISM: ICD-10-CM

## 2021-06-21 DIAGNOSIS — C64.2 CLEAR CELL CARCINOMA OF LEFT KIDNEY (HCC): ICD-10-CM

## 2021-06-21 DIAGNOSIS — G47.33 OSA (OBSTRUCTIVE SLEEP APNEA): ICD-10-CM

## 2021-06-21 DIAGNOSIS — C50.911 MALIGNANT NEOPLASM OF RIGHT FEMALE BREAST, UNSPECIFIED ESTROGEN RECEPTOR STATUS, UNSPECIFIED SITE OF BREAST (HCC): ICD-10-CM

## 2021-06-21 DIAGNOSIS — E11.22 CONTROLLED TYPE 2 DIABETES MELLITUS WITH STAGE 4 CHRONIC KIDNEY DISEASE, WITHOUT LONG-TERM CURRENT USE OF INSULIN (HCC): ICD-10-CM

## 2021-06-21 DIAGNOSIS — I50.22 CHRONIC SYSTOLIC CONGESTIVE HEART FAILURE (HCC): ICD-10-CM

## 2021-06-21 DIAGNOSIS — R79.89 ABNORMAL CBC: ICD-10-CM

## 2021-06-21 DIAGNOSIS — N18.4 CONTROLLED TYPE 2 DIABETES MELLITUS WITH STAGE 4 CHRONIC KIDNEY DISEASE, WITHOUT LONG-TERM CURRENT USE OF INSULIN (HCC): ICD-10-CM

## 2021-06-21 DIAGNOSIS — C18.6 MALIGNANT TUMOR OF DESCENDING COLON (HCC): ICD-10-CM

## 2021-06-21 DIAGNOSIS — N25.81 SECONDARY HYPERPARATHYROIDISM OF RENAL ORIGIN (HCC): ICD-10-CM

## 2021-06-21 PROCEDURE — 99214 OFFICE O/P EST MOD 30 MIN: CPT | Performed by: INTERNAL MEDICINE

## 2021-06-21 RX ORDER — ISOSORBIDE MONONITRATE 30 MG/1
30 TABLET, EXTENDED RELEASE ORAL DAILY
COMMUNITY
Start: 2021-06-07

## 2021-06-21 RX ORDER — AMIODARONE HYDROCHLORIDE 200 MG/1
TABLET ORAL
COMMUNITY
Start: 2021-05-10 | End: 2022-05-24

## 2021-06-21 RX ORDER — ATORVASTATIN CALCIUM 40 MG/1
40 TABLET, FILM COATED ORAL DAILY
Qty: 90 TABLET | Refills: 3 | Status: SHIPPED | OUTPATIENT
Start: 2021-06-21 | End: 2022-04-07

## 2021-06-21 RX ORDER — DULOXETIN HYDROCHLORIDE 60 MG/1
CAPSULE, DELAYED RELEASE ORAL
COMMUNITY
Start: 2021-06-02

## 2021-06-21 NOTE — PROGRESS NOTES
Fredy Heather presents today for   Chief Complaint   Patient presents with    Follow-up    Hypertension    Diabetes    Thyroid Problem     6-14-21    Labs              Coordination of Care:  1. Have you been to the ER, urgent care clinic since your last visit? Hospitalized since your last visit? no    2. Have you seen or consulted any other health care providers outside of the [facility] since your last visit? Include any pap smears or colon screening.  Yes

## 2021-06-21 NOTE — PROGRESS NOTES
[facility] OF [facility]:  6/21/2021, MRN: [id number]      Conchis Buchanan is a 57 y.o. female and presents to clinic for Follow-up, Hypertension, Diabetes, Thyroid Problem (6-14-21), and Labs      Subjective: The patient is a 68-year-old female with history of right sided breast cancer s/p right mastectomy, colon cancer (2019) s/p surgery, b/l clear cell carcinoma s/p b/l partial nephrectomies, pulmonary nodule, chemotherapy induced CMO/CHF, CKD (followed by ), type 2 DM, CLARENCE, multinodular goiter treated with a thyroidectomy, Covid-19 infection, postoperative hypothyroidism, CLARENCE, HLD, GERD, vitamin D deficiency, and HTN.     1. Type 2 DM: Her A1C is unchanged at 6. Not on rx. She is eating a lot of sweets. +Diet controlled. 2. HLD: Taking lipitor. Her LDL jumped to the 180s per recent labs. 3. Gout: Occurred within the past 1-2 wks. On allopurinol. Occurred along her left foot (\"the arch\"). Sx resolved by today's apt s/p prednisone. No shellfish, ETOH, or organ meat. Her most recent labs show that her uric acid level is 8.8,up from 6.1 several months ago. 4. AF/CHF: Asymptomatic. She just had her pacemaker placed. Taking coreg, bumex, eliquis, amiodarone, and isosorbide. 10/9/20 Cardiac MRI: . Limited evaluation of the myocardium without the use of gadolinium  contrast.  Mildly dilated left ventricle. Mildly dilated left atrium. Moderately depressed LV systolic function (LVEF 10%). Normal RV systolic function (RVEF 50%). Mild mitral regurgitation. Small pericardial effusion. Evidence of bilateral hilar lymphadenopathy. Recommend correlation with  dedicated chest CT. 5. CLARENCE: Testing has been ordered but she hasn't had this scheduled yet. +Snoring. 6. H/o Cancers (Breast, Colon, and Kidney Cancers): She is scheduled with Oncology later this month. In remission. 7. General:  - S/p Covid-19 vaccines    8. CKD: Scheduled with Nephrology on 7/22/21.  Taking bumex, coreg, imdur, amiodarone, and eliquis for AF and HTN hx. No hematuria. +Seconary hyperparathyroidism. Results for Lázaro Benton (MRN [id number]) as of 6/21/2021 15:20   Ref. Range 10/29/2020 09:16 3/5/2021 09:10 6/14/2021 12:45   BUN Latest Ref Range: 7.0 - 18 MG/DL 41 (H) 40 (H) 55 (H)   Creatinine Latest Ref Range: 0.6 - 1.3 MG/DL 2.21 (H) 2.67 (H) 2.74 (H)       9. Right Arm Skin lesion: Present x 6 months. +Getting bigger. 10. Health Maintenance:  - Overdue for her eye exam  - Overdue for a PAP    11. Hypothyroidism: On Synthroid. Overdue for TFTs. Asymptomatic. 12. Abnormal CBC: Her most recent labs show: Her CBC shows a high percentage of neutrophils of 9%. Her lymphocyte percentage is low at 7%. Her monocyte percentage is low at 2%. These findings are roughly unchanged from a year ago.       Patient Active Problem List    Diagnosis Date Noted    Anemia in chronic kidney disease 06/27/2020    Body mass index (BMI) greater than 25 06/27/2020    Breast cancer, right ([facility] 06/27/2020    Gout 06/27/2020    Proteinuria 06/27/2020    Secondary hyperparathyroidism of renal origin ([facility] 06/27/2020    Enteritis 01/12/2020    Chronic kidney disease 01/12/2020    On antineoplastic chemotherapy 01/12/2020    Anemia 11/23/2019    Hypercholesteremia 11/23/2019    GERD (gastroesophageal reflux disease) 11/23/2019    Vitamin D deficiency 11/23/2019    Acquired hypothyroidism 11/23/2019    HTN (hypertension) 11/23/2019    Pulmonary nodule 11/23/2019    Malignant tumor of descending colon ([facility] 07/22/2019    Chronic systolic congestive heart failure ([facility] 11/15/2017    Chronic kidney disease (CKD) stage G4/A1, severely decreased glomerular filtration rate (GFR) between 15-29 mL/min/1.73 square meter and albuminuria creatinine ratio less than 30 mg/g (HCC) 04/13/2017    Acute on chronic congestive heart failure ([facility] 02/28/2017    Malignant neoplasm of left kidney ([facility] 04/04/2016    Clear cell carcinoma of kidney ([facility] 03/22/2016    Toxic multinodular goiter 12/04/2015    CLARENCE (obstructive sleep apnea) 11/09/2015    Chemotherapy induced cardiomyopathy ([facility] 08/11/2015    Type 2 diabetes mellitus with stage 4 chronic kidney disease, without long-term current use of insulin ([facility] 08/03/2015    HX: breast cancer 08/03/2015       Current Outpatient Medications   Medication Sig Dispense Refill    amiodarone (CORDARONE) 200 mg tablet TAKE 1 TABLET TWICE DAILY FOR 18 DAYS. THEN 1 TABLET DAILY.  isosorbide mononitrate ER (IMDUR) 30 mg tablet Take 30 mg by mouth daily.  DULoxetine (CYMBALTA) 60 mg capsule       apixaban (Eliquis) 5 mg tablet Take 5 mg by mouth two (2) times a day.  bumetanide (BUMEX) 0.5 mg tablet Take  by mouth daily.  lansoprazole (PREVACID) 30 mg capsule Take 1 Cap by mouth daily. 90 Cap 1    levothyroxine (SYNTHROID) 112 mcg tablet TAKE 1 TABLET BY MOUTH EVERY DAY BEFORE BREAKFAST 90 Tab 0    atorvastatin (LIPITOR) 20 mg tablet Take 1 Tab by mouth daily. 90 Tab 3    allopurinoL (ZYLOPRIM) 100 mg tablet TAKE 1 TABLET BY MOUTH EVERY DAY      ondansetron (ZOFRAN ODT) 8 mg disintegrating tablet Take 1 Tab by mouth every eight (8) hours as needed for Nausea or Vomiting. 20 Tab 0    calcitRIOL (ROCALTROL) 0.25 mcg capsule Take 0.25 mcg by mouth every Monday, Wednesday, Friday.       COREG 25 mg tablet Take 25 mg by mouth two (2) times a day.  0       Allergies   Allergen Reactions    Codeine Swelling, Other (comments) and Not Reported This Time     Throat Swelling    Penicillins Other (comments) and Angioedema     N/V, swelling    Tramadol Swelling    Sulfa (Sulfonamide Antibiotics) Other (comments)     itching    Sulfur Hives       Past Medical History:   Diagnosis Date    A-fib ([facility]     Cancer ([facility] 2000    Right breast ca     Colon cancer ([facility]     Congestive heart failure, unspecified     Gout     H/O total mastectomy of right breast     Heart disease     Hx: UTI (urinary tract infection)     Hypercholesterolemia     Iron deficiency anemia     Nausea & vomiting     Renal cell carcinoma of left kidney ([facility] 12/17/15    Renal mass, left     Thyroid cancer ([facility]     2017       Past Surgical History:   Procedure Laterality Date    HX CHOLECYSTECTOMY      HX MASTECTOMY Right     HX NEPHRECTOMY Right 3/22/16    Partial x2, Dr. Iglesia Harry, [facility]    HX ORTHOPAEDIC  04/19/2017    Trigger finger release R hand    HX RENAL BIOPSY Right 12/17/15    Ct guided bx, Dr. Niom Sam, [facility]    HX THYROIDECTOMY  6/20/2018    IR INSERT TUNL CVC W PORT OVER 5 YEARS  10/15/2019    IR REMOVE TUNL CVAD W PORT/PUMP  3/5/2021       Family History   Problem Relation Age of Onset    Hypertension Father     Heart Attack Father     Heart Failure Father     Diabetes Father        Social History     Tobacco Use    Smoking status: Never Smoker    Smokeless tobacco: Never Used   Substance Use Topics    Alcohol use: No       ROS   Review of Systems   Constitutional: Negative for chills and fever. HENT: Negative for ear pain and sore throat. Eyes: Negative for blurred vision and pain. Respiratory: Negative for cough and shortness of breath. Cardiovascular: Negative for chest pain. Gastrointestinal: Negative for abdominal pain, blood in stool and melena. Genitourinary: Negative for dysuria and hematuria. Musculoskeletal: Positive for joint pain (off/on). Negative for myalgias. Skin:        See HPI   Neurological: Negative for headaches. Endo/Heme/Allergies: Does not bruise/bleed easily. Psychiatric/Behavioral: Negative for substance abuse. Objective     Vitals:    06/21/21 1509   BP: 118/65   Pulse: 72   Resp: 16   Temp: 97.7 °F (36.5 °C)   TempSrc: Temporal   SpO2: 96%   Weight: 181 lb (82.1 kg)   Height: 5' 6\" (1.676 m)   PainSc:   0 - No pain       Physical Exam  Vitals and nursing note reviewed. HENT:      Head: Normocephalic and atraumatic.       Right Ear: External ear normal.      Left Ear: External ear normal.   Eyes:      General: No scleral icterus. Right eye: No discharge. Left eye: No discharge. Conjunctiva/sclera: Conjunctivae normal.   Cardiovascular:      Rate and Rhythm: Normal rate and regular rhythm. Heart sounds: Normal heart sounds. No murmur heard. No friction rub. No gallop. Pulmonary:      Effort: Pulmonary effort is normal. No respiratory distress. Breath sounds: Normal breath sounds. No wheezing or rales. Abdominal:      General: Bowel sounds are normal. There is no distension. Palpations: Abdomen is soft. There is no mass. Tenderness: There is no abdominal tenderness. There is no guarding or rebound. Musculoskeletal:         General: No swelling (BUE) or tenderness (BUE). Cervical back: Neck supple. Lymphadenopathy:      Cervical: No cervical adenopathy. Skin:     General: Skin is warm and dry. Findings: No erythema. Comments: Flat flesh colored nonerythematous skin lesion (circular) noted along her RUE   Neurological:      Mental Status: She is alert and oriented to person, place, and time. Motor: No abnormal muscle tone. Gait: Gait is intact.  Gait normal.   Psychiatric:         Mood and Affect: Mood and affect normal.         LABS   Data Review:   Lab Results   Component Value Date/Time    WBC 11.3 06/14/2021 12:45 PM    HGB 12.7 06/14/2021 12:45 PM    HCT 41.5 06/14/2021 12:45 PM    PLATELET 484 24/03/1474 12:45 PM    MCV 91.4 06/14/2021 12:45 PM       Lab Results   Component Value Date/Time    Sodium 142 06/14/2021 12:45 PM    Potassium 3.9 06/14/2021 12:45 PM    Chloride 107 06/14/2021 12:45 PM    CO2 28 06/14/2021 12:45 PM    Anion gap 7 06/14/2021 12:45 PM    Glucose 143 (H) 06/14/2021 12:45 PM    BUN 55 (H) 06/14/2021 12:45 PM    Creatinine 2.74 (H) 06/14/2021 12:45 PM    BUN/Creatinine ratio 20 06/14/2021 12:45 PM    GFR est AA 21 (L) 06/14/2021 12:45 PM    GFR est non-AA 18 (L) 06/14/2021 12:45 PM    Calcium 9.8 06/14/2021 12:45 PM       Lab Results   Component Value Date/Time    Cholesterol, total 252 (H) 06/14/2021 12:45 PM    HDL Cholesterol 48 06/14/2021 12:45 PM    LDL, calculated 186.4 (H) 06/14/2021 12:45 PM    VLDL, calculated 17.6 06/14/2021 12:45 PM    Triglyceride 88 06/14/2021 12:45 PM    CHOL/HDL Ratio 5.3 (H) 06/14/2021 12:45 PM       Lab Results   Component Value Date/Time    Hemoglobin A1c 6.0 (H) 06/14/2021 12:45 PM    Hemoglobin A1c (POC) 6.2 07/31/2015 12:27 PM       Assessment/Plan:   1. Hypercholesteremia: LDL is very high!  - Increasing her lipitor to 40mg daily from 20mg daily.  - F/u lipid panel ordered. ORDERS:  - atorvastatin (LIPITOR) 40 mg tablet; Take 1 Tablet by mouth daily. Dispense: 90 Tablet; Refill: 3  - LIPID PANEL; Future    2. CHF/AF: Stable. - C/w rx as prescribed by Cardiology. Routine Cardiology f/u recommended. 3. Controlled type 2 diabetes mellitus with stage 4 chronic kidney disease: Stable. - Low carb diet recommended. - She will need a f/u A1C. Ordering an A1C    4. Chronic kidney disease: Stable. +Secondary hyperparathyroidism.   - F/u with Nephrology recommended. C/w rx per Nephrology recommendations. 5. Skin Lesion: The area is getting larger but PE findings are reassuring.   - Pt instructed to f/u with Dermatology    6. Acquired hypothyroidism:   - C/w rx as prescribed. - Checking TFTs in 3 months    7. CLARENCE: Stable. - I encouraged her to schedule an apt with Sleep Medicine    8. Abnormal CBC:   - Checking a CBC in 3 months  - Continue to f/u with Oncology (see #9)    9. Cancer hx: In remission.  - F/u with Oncology for continued cancer surveillance    10. Gout:   - I encouraged her to c/w rx as prescribed.     11. General:  - I encouraged her to get her formal eye exam          Health Maintenance Due   Topic Date Due    COVID-19 Vaccine (1) Never done    PAP AKA CERVICAL CYTOLOGY  Never done    Shingrix Vaccine Age 50> (1 of 2) Never done    Eye Exam Retinal or Dilated  12/19/2018    Foot Exam Q1  06/06/2020         Lab review: labs are reviewed in the EHR and ordered as mentioned above    I have discussed the diagnosis with the patient and the intended plan as seen in the above orders. The patient has received an after-visit summary and questions were answered concerning future plans. I have discussed medication side effects and warnings with the patient as well. I have reviewed the plan of care with the patient, accepted their input and they are in agreement with the treatment goals. All questions were answered. The patient understands the plan of care. Handouts provided today with above information. Pt instructed if symptoms worsen to call the office or report to the ED for continued care. Greater than 50% of the visit time was spent in counseling and/or coordination of care. Voice recognition was used to generate this report, which may have resulted in some phonetic based errors in grammar and contents. Even though attempts were made to correct all the mistakes, some may have been missed, and remained in the body of the document.           Ijeoma Whitehead MD

## 2021-06-29 PROBLEM — R80.9 PROTEINURIA: Status: RESOLVED | Noted: 2020-06-27 | Resolved: 2021-06-29

## 2021-06-29 PROBLEM — Z79.899 ON ANTINEOPLASTIC CHEMOTHERAPY: Status: RESOLVED | Noted: 2020-01-12 | Resolved: 2021-06-29

## 2021-06-29 PROBLEM — I50.9 ACUTE ON CHRONIC CONGESTIVE HEART FAILURE (HCC): Status: RESOLVED | Noted: 2017-02-28 | Resolved: 2021-06-29

## 2021-06-29 PROBLEM — D64.9 ANEMIA: Status: RESOLVED | Noted: 2019-11-23 | Resolved: 2021-06-29

## 2021-06-29 PROBLEM — N18.9 CHRONIC KIDNEY DISEASE: Status: RESOLVED | Noted: 2020-01-12 | Resolved: 2021-06-29

## 2021-06-30 ENCOUNTER — TELEPHONE (OUTPATIENT)
Dept: INTERNAL MEDICINE CLINIC | Age: 61
End: 2021-06-30

## 2021-06-30 NOTE — TELEPHONE ENCOUNTER
----- Message from Kentrell Duong MD sent at 6/29/2021  8:07 PM EDT -----  Regarding: Follow up apt and call needed  Please have her scheduled for labs in early October and a follow up apt (30 min) in office a wk later. Also let her know that I placed a referral to Dermatology for the skin lesion along her RUE that was getting larger. Does she have a dermatologist already? Thanks,  Dr. Kaci Castro  [facility] [address]  Phone: [phone]  Fax: [phone]  ----- Message -----  From: Deyanira Mendoza LPN  Sent: 5/40/1043  10:10 AM EDT  To: Kentrell Duong MD  Subject: RE: Lab results                                  Patient would like to know when she needs her next follow up with you  ----- Message -----  From: Joe Wade MD  Sent: 6/21/2021   3:52 PM EDT  To: Tangela Bhatia LPN  Subject: Lab results                                      Please forward her lab results to  (Oncology).      Thanks,  Dr. Kaci Castro  [facility] [address]  Phone: [phone]  Fax: [phone]

## 2021-06-30 NOTE — LETTER
7/6/2021    Ms. Octavio Alexis  [address] [id number]        Dear Ms. Carcamo,    We have been unable to reach you by phone to notify you of the referral we have sent. Sarah Brooks has referred you to a Dermatologist to examine the skin lesion. They will call you to schedule the appointment. We have referred you to [facility], here is there phone number [phone], please call them if you do not hear from them within 2 weeks.       Sincerely,      Adam Joy MD

## 2021-07-10 DIAGNOSIS — E05.20 TOXIC MULTINODULAR GOITER: ICD-10-CM

## 2021-07-12 RX ORDER — LEVOTHYROXINE SODIUM 112 UG/1
TABLET ORAL
Qty: 90 TABLET | Refills: 3 | Status: SHIPPED | OUTPATIENT
Start: 2021-07-12 | End: 2022-05-18

## 2021-07-12 RX ORDER — LANSOPRAZOLE 30 MG/1
CAPSULE, DELAYED RELEASE ORAL
Qty: 90 CAPSULE | Refills: 3 | Status: SHIPPED | OUTPATIENT
Start: 2021-07-12 | End: 2022-06-08

## 2021-07-12 NOTE — TELEPHONE ENCOUNTER
Please schedule her for a follow-up in office 30-minute appointment with me in October.     Dr. Teri Lackey  [facility], [facility], [address] [place] [address] Str.  Phone: [phone]  Fax: [phone]

## 2021-08-05 RX ORDER — CALCITRIOL 0.25 UG/1
CAPSULE ORAL
Qty: 90 CAPSULE | Refills: 3 | Status: SHIPPED | OUTPATIENT
Start: 2021-08-05 | End: 2022-06-08

## 2021-08-06 ENCOUNTER — TELEPHONE (OUTPATIENT)
Dept: INTERNAL MEDICINE CLINIC | Age: 61
End: 2021-08-06

## 2021-08-06 NOTE — TELEPHONE ENCOUNTER
----- Message from Adam Joy MD sent at 8/5/2021  4:49 PM EDT -----  Regarding: F/u apt in October  Please schedule patient for a 30-minute in office visit with me in October. Please schedule her for labs 1 week before her appointment.       Dr. Saturnino Burkett  [facility], [address] [place] [address], [address] [place] [address] Str.  Phone: [phone]  Fax: [phone]

## 2021-08-16 ENCOUNTER — HOSPITAL ENCOUNTER (OUTPATIENT)
Dept: ULTRASOUND IMAGING | Age: 61
Discharge: HOME | End: 2021-08-16
Attending: UROLOGY
Payer: COMMERCIAL

## 2021-08-16 DIAGNOSIS — C64.9 RENAL CELL CARCINOMA, UNSPECIFIED LATERALITY (HCC): ICD-10-CM

## 2021-08-16 PROCEDURE — 76770 US EXAM ABDO BACK WALL COMP: CPT

## 2021-09-21 ENCOUNTER — TRANSCRIBE ORDER (OUTPATIENT)
Dept: SCHEDULING | Age: 61
End: 2021-09-21

## 2021-09-21 DIAGNOSIS — C50.919 BREAST CANCER, FEMALE (HCC): ICD-10-CM

## 2021-09-21 DIAGNOSIS — C18.9 PRIMARY MALIGNANT NEOPLASM OF COLON (HCC): Primary | ICD-10-CM

## 2021-09-21 DIAGNOSIS — Z12.31 VISIT FOR SCREENING MAMMOGRAM: Primary | ICD-10-CM

## 2021-10-19 ENCOUNTER — OFFICE VISIT (OUTPATIENT)
Dept: INTERNAL MEDICINE CLINIC | Age: 61
End: 2021-10-19
Payer: COMMERCIAL

## 2021-10-19 ENCOUNTER — HOSPITAL ENCOUNTER (OUTPATIENT)
Dept: LAB | Age: 61
Discharge: HOME | End: 2021-10-19
Payer: COMMERCIAL

## 2021-10-19 ENCOUNTER — TELEPHONE (OUTPATIENT)
Dept: INTERNAL MEDICINE CLINIC | Age: 61
End: 2021-10-19

## 2021-10-19 VITALS
TEMPERATURE: 97.3 F | HEART RATE: 77 BPM | DIASTOLIC BLOOD PRESSURE: 74 MMHG | OXYGEN SATURATION: 95 % | HEIGHT: 66 IN | WEIGHT: 184 LBS | RESPIRATION RATE: 18 BRPM | BODY MASS INDEX: 29.57 KG/M2 | SYSTOLIC BLOOD PRESSURE: 108 MMHG

## 2021-10-19 DIAGNOSIS — R35.0 URINARY FREQUENCY: ICD-10-CM

## 2021-10-19 DIAGNOSIS — R30.0 DYSURIA: ICD-10-CM

## 2021-10-19 DIAGNOSIS — E03.9 ACQUIRED HYPOTHYROIDISM: ICD-10-CM

## 2021-10-19 DIAGNOSIS — M54.50 LOW BACK PAIN WITHOUT SCIATICA, UNSPECIFIED BACK PAIN LATERALITY, UNSPECIFIED CHRONICITY: ICD-10-CM

## 2021-10-19 DIAGNOSIS — M10.9 GOUT, UNSPECIFIED CAUSE, UNSPECIFIED CHRONICITY, UNSPECIFIED SITE: ICD-10-CM

## 2021-10-19 DIAGNOSIS — N39.0 URINARY TRACT INFECTION WITHOUT HEMATURIA, SITE UNSPECIFIED: Primary | ICD-10-CM

## 2021-10-19 DIAGNOSIS — E11.22 TYPE 2 DIABETES MELLITUS WITH STAGE 4 CHRONIC KIDNEY DISEASE, WITHOUT LONG-TERM CURRENT USE OF INSULIN (HCC): ICD-10-CM

## 2021-10-19 DIAGNOSIS — N18.4 TYPE 2 DIABETES MELLITUS WITH STAGE 4 CHRONIC KIDNEY DISEASE, WITHOUT LONG-TERM CURRENT USE OF INSULIN (HCC): ICD-10-CM

## 2021-10-19 LAB
BILIRUB UR QL STRIP: NEGATIVE
GLUCOSE UR-MCNC: NEGATIVE MG/DL
KETONES P FAST UR STRIP-MCNC: NEGATIVE MG/DL
PH UR STRIP: 6 [PH] (ref 4.6–8)
PROT UR QL STRIP: NORMAL
SP GR UR STRIP: 1.01 (ref 1–1.03)
UA UROBILINOGEN AMB POC: NORMAL (ref 0.2–1)
URINALYSIS CLARITY POC: CLEAR
URINALYSIS COLOR POC: NORMAL
URINE BLOOD POC: NEGATIVE
URINE LEUKOCYTES POC: NORMAL
URINE NITRITES POC: POSITIVE

## 2021-10-19 PROCEDURE — 87186 SC STD MICRODIL/AGAR DIL: CPT

## 2021-10-19 PROCEDURE — 87086 URINE CULTURE/COLONY COUNT: CPT

## 2021-10-19 PROCEDURE — 99214 OFFICE O/P EST MOD 30 MIN: CPT | Performed by: INTERNAL MEDICINE

## 2021-10-19 PROCEDURE — 87077 CULTURE AEROBIC IDENTIFY: CPT

## 2021-10-19 PROCEDURE — 81001 URINALYSIS AUTO W/SCOPE: CPT | Performed by: INTERNAL MEDICINE

## 2021-10-19 RX ORDER — CIPROFLOXACIN 250 MG/1
250 TABLET, FILM COATED ORAL 2 TIMES DAILY
Qty: 10 TABLET | Refills: 0 | Status: SHIPPED | OUTPATIENT
Start: 2021-10-19 | End: 2022-05-24 | Stop reason: SDUPTHER

## 2021-10-19 RX ORDER — BUMETANIDE 1 MG/1
1 TABLET ORAL DAILY
COMMUNITY
Start: 2021-07-24

## 2021-10-19 NOTE — PROGRESS NOTES
INTERNISTS [facility]:  10/19/2021, MRN: [id number]      Capo Shields is a 57 y.o. female and presents to clinic for Bladder Infection (pt reports having dysuria, frequent urination, and low back pain. Patient denies any hematuria.)      Subjective: The patient is a 69-year-old female with history of right sided breast cancer s/p right mastectomy, colon cancer (2019) s/p surgery, b/l clear cell carcinoma s/p b/l partial nephrectomies, pulmonary nodule, chemotherapy induced CMO/CHF, CKD (followed by Deion Mena), type 2 DM, CLARENCE, multinodular goiter treated with a thyroidectomy, Covid-19 infection,  pacemaker placement (2021), postoperative hypothyroidism, CLARENCE, HLD, GERD, vitamin D deficiency, and HTN.     UTI: She is reporting 2 days of dysuria and lower back pain (midline). She had some improvement with AZO. +Fever. She had one episode of nausea/vomiting. No abdominal cramping. No hematuria. +Frequency. She has a history of CKD secondary to having received chemotherapy for her history of cancer in the past in addition to having a partial nephrectomy due to history of kidney cancer.       Patient Active Problem List    Diagnosis Date Noted    Anemia in chronic kidney disease 06/27/2020    Breast cancer, right ([facility] 06/27/2020    Gout 06/27/2020    Secondary hyperparathyroidism of renal origin ([facility] 06/27/2020    Enteritis 01/12/2020    Hypercholesteremia 11/23/2019    GERD (gastroesophageal reflux disease) 11/23/2019    Vitamin D deficiency 11/23/2019    Acquired hypothyroidism 11/23/2019    HTN (hypertension) 11/23/2019    Pulmonary nodule 11/23/2019    Malignant tumor of descending colon ([facility] 07/22/2019    Chronic systolic congestive heart failure ([facility] 11/15/2017    Chronic kidney disease (CKD) stage G4/A1, severely decreased glomerular filtration rate (GFR) between 15-29 mL/min/1.73 square meter and albuminuria creatinine ratio less than 30 mg/g (HCC) 04/13/2017    Malignant neoplasm of left kidney ([facility] 04/04/2016    Clear cell carcinoma of kidney ([facility] 03/22/2016    Toxic multinodular goiter 12/04/2015    CLARENCE (obstructive sleep apnea) 11/09/2015    Chemotherapy induced cardiomyopathy ([facility] 08/11/2015    Type 2 diabetes mellitus with stage 4 chronic kidney disease, without long-term current use of insulin ([facility] 08/03/2015       Current Outpatient Medications   Medication Sig Dispense Refill    bumetanide (BUMEX) 1 mg tablet Take 1 mg by mouth daily.  calcitRIOL (ROCALTROL) 0.25 mcg capsule TAKE 1 CAPSULE BY MOUTH EVERY DAY 90 Capsule 3    lansoprazole (PREVACID) 30 mg capsule TAKE 1 CAPSULE BY MOUTH EVERY DAY 90 Capsule 3    levothyroxine (SYNTHROID) 112 mcg tablet TAKE 1 TABLET BY MOUTH EVERY DAY BEFORE BREAKFAST 90 Tablet 3    amiodarone (CORDARONE) 200 mg tablet TAKE 1 TABLET TWICE DAILY FOR 18 DAYS. THEN 1 TABLET DAILY.  isosorbide mononitrate ER (IMDUR) 30 mg tablet Take 30 mg by mouth daily.  DULoxetine (CYMBALTA) 60 mg capsule       atorvastatin (LIPITOR) 40 mg tablet Take 1 Tablet by mouth daily. 90 Tablet 3    apixaban (Eliquis) 5 mg tablet Take 5 mg by mouth two (2) times a day.  bumetanide (BUMEX) 0.5 mg tablet Take  by mouth daily.  allopurinoL (ZYLOPRIM) 100 mg tablet TAKE 1 TABLET BY MOUTH EVERY DAY      ondansetron (ZOFRAN ODT) 8 mg disintegrating tablet Take 1 Tab by mouth every eight (8) hours as needed for Nausea or Vomiting.  20 Tab 0    COREG 25 mg tablet Take 25 mg by mouth two (2) times a day.  0       Allergies   Allergen Reactions    Codeine Swelling, Other (comments) and Not Reported This Time     Throat Swelling    Penicillins Other (comments) and Angioedema     N/V, swelling    Tramadol Swelling    Sulfa (Sulfonamide Antibiotics) Other (comments)     itching    Sulfur Hives       Past Medical History:   Diagnosis Date    A-fib ([place] [address]     Cancer ([place] [address] 2000    Right breast ca     Colon cancer (HCC)     Congestive heart failure, unspecified     Gout     H/O total mastectomy of right breast     Heart disease     Hx: UTI (urinary tract infection)     Hypercholesterolemia     Iron deficiency anemia     Nausea & vomiting     Renal cell carcinoma of left kidney ([facility] 12/17/15    Renal mass, left     Thyroid cancer ([facility]     2017       Past Surgical History:   Procedure Laterality Date    HX CHOLECYSTECTOMY      HX MASTECTOMY Right     HX NEPHRECTOMY Right 3/22/16    Partial x2, Dr. James Mensah, [facility]    HX ORTHOPAEDIC  04/19/2017    Trigger finger release R hand    HX RENAL BIOPSY Right 12/17/15    Ct guided bx, Dr. Leo Gaitan, [facility]    HX THYROIDECTOMY  6/20/2018    IR INSERT TUNL CVC W PORT OVER 5 YEARS  10/15/2019    IR REMOVE TUNL CVAD W PORT/PUMP  3/5/2021       Family History   Problem Relation Age of Onset    Hypertension Father     Heart Attack Father     Heart Failure Father     Diabetes Father        Social History     Tobacco Use    Smoking status: Never Smoker    Smokeless tobacco: Never Used   Substance Use Topics    Alcohol use: No       ROS   Review of Systems   Constitutional: Negative for chills and fever. HENT: Negative for ear pain and sore throat. Eyes: Negative for blurred vision and pain. Respiratory: Negative for cough and shortness of breath. Cardiovascular: Negative for chest pain. Gastrointestinal: Positive for abdominal pain. Negative for blood in stool and melena. Genitourinary: Positive for dysuria and frequency. Negative for hematuria. Musculoskeletal: Positive for back pain. Negative for joint pain and myalgias. Skin: Negative for rash. Neurological: Negative for headaches. Endo/Heme/Allergies: Does not bruise/bleed easily. Psychiatric/Behavioral: Negative for substance abuse.        Objective     Vitals:    10/19/21 1040   BP: 108/74   Pulse: 77   Resp: 18   Temp: 97.3 °F (36.3 °C)   TempSrc: Temporal   SpO2: 95%   Weight: 184 lb (83.5 kg)   Height: 5' 6\" (1.676 m) Physical Exam  Vitals and nursing note reviewed. HENT:      Head: Normocephalic and atraumatic. Right Ear: External ear normal.      Left Ear: External ear normal.   Eyes:      General: No scleral icterus. Right eye: No discharge. Left eye: No discharge. Conjunctiva/sclera: Conjunctivae normal.   Cardiovascular:      Rate and Rhythm: Normal rate and regular rhythm. Heart sounds: Normal heart sounds. No murmur heard. No friction rub. No gallop. Pulmonary:      Effort: Pulmonary effort is normal. No respiratory distress. Breath sounds: Normal breath sounds. No wheezing or rales. Abdominal:      General: Bowel sounds are normal. There is no distension. Palpations: Abdomen is soft. There is no mass. Tenderness: There is abdominal tenderness (mildly TTP along her suprapubic area). There is no right CVA tenderness, left CVA tenderness, guarding or rebound. Musculoskeletal:         General: No swelling (BUE) or tenderness (BUE). Cervical back: Neck supple. Lymphadenopathy:      Cervical: No cervical adenopathy. Skin:     General: Skin is warm and dry. Findings: No erythema or rash. Neurological:      Mental Status: She is alert. Motor: No abnormal muscle tone.       Gait: Gait normal.   Psychiatric:         Mood and Affect: Mood normal.         LABS   Data Review:   Lab Results   Component Value Date/Time    WBC 11.3 06/14/2021 12:45 PM    HGB 12.7 06/14/2021 12:45 PM    HCT 41.5 06/14/2021 12:45 PM    PLATELET 650 67/75/3211 12:45 PM    MCV 91.4 06/14/2021 12:45 PM       Lab Results   Component Value Date/Time    Sodium 142 06/14/2021 12:45 PM    Potassium 3.9 06/14/2021 12:45 PM    Chloride 107 06/14/2021 12:45 PM    CO2 28 06/14/2021 12:45 PM    Anion gap 7 06/14/2021 12:45 PM    Glucose 143 (H) 06/14/2021 12:45 PM    BUN 55 (H) 06/14/2021 12:45 PM    Creatinine 2.74 (H) 06/14/2021 12:45 PM    BUN/Creatinine ratio 20 06/14/2021 12:45 PM GFR est AA 21 (L) 06/14/2021 12:45 PM    GFR est non-AA 18 (L) 06/14/2021 12:45 PM    Calcium 9.8 06/14/2021 12:45 PM       Lab Results   Component Value Date/Time    Cholesterol, total 252 (H) 06/14/2021 12:45 PM    HDL Cholesterol 48 06/14/2021 12:45 PM    LDL, calculated 186.4 (H) 06/14/2021 12:45 PM    VLDL, calculated 17.6 06/14/2021 12:45 PM    Triglyceride 88 06/14/2021 12:45 PM    CHOL/HDL Ratio 5.3 (H) 06/14/2021 12:45 PM       Lab Results   Component Value Date/Time    Hemoglobin A1c 6.0 (H) 06/14/2021 12:45 PM    Hemoglobin A1c (POC) 6.2 07/31/2015 12:27 PM     Results for orders placed or performed in visit on 10/19/21   AMB POC URINALYSIS DIP STICK AUTO W/ MICRO     Status: None   Result Value Ref Range Status    Color (UA POC) Orange  Final     Comment: Due to AZO    Clarity (UA POC) Clear  Final    Glucose (UA POC) Negative Negative Final    Bilirubin (UA POC) Negative Negative Final    Ketones (UA POC) Negative Negative Final    Specific gravity (UA POC) 1.015 1.001 - 1.035 Final    Blood (UA POC) Negative Negative Final    pH (UA POC) 6.0 4.6 - 8.0 Final    Protein (UA POC) Trace Negative Final    Urobilinogen (UA POC) 0.2 mg/dL 0.2 - 1 Final    Nitrites (UA POC) Positive Negative Final    Leukocyte esterase (UA POC) 1+ Negative Final   Results for orders placed or performed in visit on 06/24/20   AMB POC URINALYSIS DIP STICK AUTO W/O MICRO     Status: None   Result Value Ref Range Status    Color (UA POC) Yellow  Final    Clarity (UA POC) Clear  Final    Glucose (UA POC) Negative Negative Final    Bilirubin (UA POC) Negative Negative Final    Ketones (UA POC) Negative Negative Final    Specific gravity (UA POC) 1.015 1.001 - 1.035 Final    Blood (UA POC) Negative Negative Final    pH (UA POC) 7.0 4.6 - 8.0 Final    Protein (UA POC) 1+ Negative Final    Urobilinogen (UA POC) 0.2 mg/dL 0.2 - 1 Final    Nitrites (UA POC) Negative Negative Final    Leukocyte esterase (UA POC) 1+ Negative Final Assessment/Plan:   Urinary tract infection without hematuria: Her UA results today are consistent with an underlying UTI. Ordering a course of Ciprofloxacin. Ordering a urine culture. Notify me if symptoms do not resolve with treatment. ORDERS:  - ciprofloxacin HCl (CIPRO) 250 mg tablet; Take 1 Tablet by mouth two (2) times a day for 5 days. Dispense: 10 Tablet; Refill: 0  - AMB POC URINALYSIS DIP STICK AUTO W/ MICRO  - CULTURE, URINE; Future    **Checking labs given her history of gout, cancer, and type 2 diabetes in March**    Health Maintenance Due   Topic Date Due    COVID-19 Vaccine (1) Never done    Cervical cancer screen  Never done    Shingrix Vaccine Age 50> (1 of 2) Never done    Eye Exam Retinal or Dilated  12/19/2018    Foot Exam Q1  06/06/2020    Flu Vaccine (1) 09/01/2021    Breast Cancer Screen Mammogram  10/30/2021         Lab review: labs are reviewed in the EHR and ordered as mentioned above. I have discussed the diagnosis with the patient and the intended plan as seen in the above orders. The patient has received an after-visit summary and questions were answered concerning future plans. I have discussed medication side effects and warnings with the patient as well. I have reviewed the plan of care with the patient, accepted their input and they are in agreement with the treatment goals. All questions were answered. The patient understands the plan of care. Handouts provided today with above information. Pt instructed if symptoms worsen to call the office or report to the ED for continued care. Greater than 50% of the visit time was spent in counseling and/or coordination of care. Voice recognition was used to generate this report, which may have resulted in some phonetic based errors in grammar and contents. Even though attempts were made to correct all the mistakes, some may have been missed, and remained in the body of the document.           Vicki Dyer, MD

## 2021-10-19 NOTE — PROGRESS NOTES
Shona Souza presents today for   Chief Complaint   Patient presents with    Bladder Infection     pt reports having dysuria, frequent urination, and low back pain. Patient denies any hematuria. Coordination of Care:  1. Have you been to the ER, urgent care clinic since your last visit? Hospitalized since your last visit? no    2. Have you seen or consulted any other health care providers outside of the [address] [place] [address] since your last visit? Include any pap smears or colon screening.  yes

## 2021-10-19 NOTE — TELEPHONE ENCOUNTER
Pt called and said she believes she may have a UTI. Starting yesterday afternoon 10/18/21, she has had pain in her lower back, frequent urination, and a burning sensation when she using the bathroom. She started taking AZO over the counter, and it has helped. Because of her kidney disease, she wanted to ask Dr. Patrick Horvath if she needs to give a urine sample and what to do. Please advise, thank you!

## 2021-10-22 LAB
BACTERIA SPEC CULT: ABNORMAL
CC UR VC: ABNORMAL
SERVICE CMNT-IMP: ABNORMAL

## 2021-10-26 PROBLEM — K52.9 ENTERITIS: Status: RESOLVED | Noted: 2020-01-12 | Resolved: 2021-10-26

## 2021-10-28 ENCOUNTER — TELEPHONE (OUTPATIENT)
Dept: INTERNAL MEDICINE CLINIC | Age: 61
End: 2021-10-28

## 2021-10-28 NOTE — TELEPHONE ENCOUNTER
----- Message from Andres Celis MD sent at 10/26/2021 12:07 AM EDT -----  Regarding: F/u apts needed  Please schedule patient for a 30-minute in office appointment with me in March. Please schedule for labs 1 week before her appointment.     Thanks,  Dr. Jaxon Damico  [facility], [address] [place] [address], [address] [place] [address] Str.  Phone: [phone]  Fax: [phone]

## 2021-11-16 ENCOUNTER — HOSPITAL ENCOUNTER (OUTPATIENT)
Dept: LAB | Age: 61
Discharge: HOME | End: 2021-11-16
Payer: COMMERCIAL

## 2021-11-16 DIAGNOSIS — N18.4 CONTROLLED TYPE 2 DIABETES MELLITUS WITH STAGE 4 CHRONIC KIDNEY DISEASE, WITHOUT LONG-TERM CURRENT USE OF INSULIN (HCC): ICD-10-CM

## 2021-11-16 DIAGNOSIS — E78.00 HYPERCHOLESTEREMIA: ICD-10-CM

## 2021-11-16 DIAGNOSIS — E11.22 TYPE 2 DIABETES MELLITUS WITH STAGE 4 CHRONIC KIDNEY DISEASE, WITHOUT LONG-TERM CURRENT USE OF INSULIN (HCC): ICD-10-CM

## 2021-11-16 DIAGNOSIS — E03.9 ACQUIRED HYPOTHYROIDISM: ICD-10-CM

## 2021-11-16 DIAGNOSIS — N18.4 TYPE 2 DIABETES MELLITUS WITH STAGE 4 CHRONIC KIDNEY DISEASE, WITHOUT LONG-TERM CURRENT USE OF INSULIN (HCC): ICD-10-CM

## 2021-11-16 DIAGNOSIS — M10.9 GOUT, UNSPECIFIED CAUSE, UNSPECIFIED CHRONICITY, UNSPECIFIED SITE: ICD-10-CM

## 2021-11-16 DIAGNOSIS — R79.89 ABNORMAL CBC: ICD-10-CM

## 2021-11-16 DIAGNOSIS — E11.22 CONTROLLED TYPE 2 DIABETES MELLITUS WITH STAGE 4 CHRONIC KIDNEY DISEASE, WITHOUT LONG-TERM CURRENT USE OF INSULIN (HCC): ICD-10-CM

## 2021-11-16 LAB
25(OH)D3 SERPL-MCNC: 22.5 NG/ML (ref 30–100)
ALBUMIN SERPL-MCNC: 3.7 G/DL (ref 3.4–5)
ALBUMIN SERPL-MCNC: 3.8 G/DL (ref 3.4–5)
ALBUMIN/GLOB SERPL: 1.2 {RATIO} (ref 0.8–1.7)
ALP SERPL-CCNC: 398 U/L (ref 45–117)
ALT SERPL-CCNC: 49 U/L (ref 13–56)
ANION GAP SERPL CALC-SCNC: 8 MMOL/L (ref 3–18)
ANION GAP SERPL CALC-SCNC: 9 MMOL/L (ref 3–18)
AST SERPL-CCNC: 36 U/L (ref 10–38)
BASOPHILS # BLD: 0.1 K/UL (ref 0–0.1)
BASOPHILS NFR BLD: 1 % (ref 0–2)
BILIRUB SERPL-MCNC: 0.7 MG/DL (ref 0.2–1)
BUN SERPL-MCNC: 44 MG/DL (ref 7–18)
BUN SERPL-MCNC: 44 MG/DL (ref 7–18)
BUN/CREAT SERPL: 18 (ref 12–20)
BUN/CREAT SERPL: 18 (ref 12–20)
CALCIUM SERPL-MCNC: 8.9 MG/DL (ref 8.5–10.1)
CALCIUM SERPL-MCNC: 9 MG/DL (ref 8.5–10.1)
CALCIUM SERPL-MCNC: 9.1 MG/DL (ref 8.5–10.1)
CHLORIDE SERPL-SCNC: 106 MMOL/L (ref 100–111)
CHLORIDE SERPL-SCNC: 106 MMOL/L (ref 100–111)
CHOLEST SERPL-MCNC: 204 MG/DL
CO2 SERPL-SCNC: 26 MMOL/L (ref 21–32)
CO2 SERPL-SCNC: 26 MMOL/L (ref 21–32)
CREAT SERPL-MCNC: 2.4 MG/DL (ref 0.6–1.3)
CREAT SERPL-MCNC: 2.45 MG/DL (ref 0.6–1.3)
CREAT UR-MCNC: 40 MG/DL (ref 30–125)
CREAT UR-MCNC: 40 MG/DL (ref 30–125)
DIFFERENTIAL METHOD BLD: ABNORMAL
EOSINOPHIL # BLD: 0.1 K/UL (ref 0–0.4)
EOSINOPHIL NFR BLD: 2 % (ref 0–5)
ERYTHROCYTE [DISTWIDTH] IN BLOOD BY AUTOMATED COUNT: 14.6 % (ref 11.6–14.5)
EST. AVERAGE GLUCOSE BLD GHB EST-MCNC: 131 MG/DL
GLOBULIN SER CALC-MCNC: 3.1 G/DL (ref 2–4)
GLUCOSE SERPL-MCNC: 84 MG/DL (ref 74–99)
GLUCOSE SERPL-MCNC: 84 MG/DL (ref 74–99)
HBA1C MFR BLD: 6.2 % (ref 4.2–5.6)
HCT VFR BLD AUTO: 43 % (ref 35–45)
HDLC SERPL-MCNC: 45 MG/DL (ref 40–60)
HDLC SERPL: 4.5 {RATIO} (ref 0–5)
HGB BLD-MCNC: 13.6 G/DL (ref 12–16)
IMM GRANULOCYTES # BLD AUTO: 0 K/UL (ref 0–0.04)
IMM GRANULOCYTES NFR BLD AUTO: 0 % (ref 0–0.5)
LDLC SERPL CALC-MCNC: 136.6 MG/DL (ref 0–100)
LIPID PROFILE,FLP: ABNORMAL
LYMPHOCYTES # BLD: 1.9 K/UL (ref 0.9–3.6)
LYMPHOCYTES NFR BLD: 27 % (ref 21–52)
MCH RBC QN AUTO: 30.5 PG (ref 24–34)
MCHC RBC AUTO-ENTMCNC: 31.6 G/DL (ref 31–37)
MCV RBC AUTO: 96.4 FL (ref 78–100)
MICROALBUMIN UR-MCNC: 2.07 MG/DL (ref 0–3)
MICROALBUMIN UR-MCNC: 2.12 MG/DL (ref 0–3)
MICROALBUMIN/CREAT UR-RTO: 52 MG/G (ref 0–30)
MICROALBUMIN/CREAT UR-RTO: 53 MG/G (ref 0–30)
MONOCYTES # BLD: 0.5 K/UL (ref 0.05–1.2)
MONOCYTES NFR BLD: 6 % (ref 3–10)
NEUTS SEG # BLD: 4.5 K/UL (ref 1.8–8)
NEUTS SEG NFR BLD: 64 % (ref 40–73)
NRBC # BLD: 0 K/UL (ref 0–0.01)
NRBC BLD-RTO: 0 PER 100 WBC
PHOSPHATE SERPL-MCNC: 4 MG/DL (ref 2.5–4.9)
PLATELET # BLD AUTO: 198 K/UL (ref 135–420)
PMV BLD AUTO: 9.9 FL (ref 9.2–11.8)
POTASSIUM SERPL-SCNC: 3.8 MMOL/L (ref 3.5–5.5)
POTASSIUM SERPL-SCNC: 3.8 MMOL/L (ref 3.5–5.5)
PROT SERPL-MCNC: 6.8 G/DL (ref 6.4–8.2)
PTH-INTACT SERPL-MCNC: 244.9 PG/ML (ref 18.4–88)
RBC # BLD AUTO: 4.46 M/UL (ref 4.2–5.3)
SODIUM SERPL-SCNC: 140 MMOL/L (ref 136–145)
SODIUM SERPL-SCNC: 141 MMOL/L (ref 136–145)
T4 FREE SERPL-MCNC: 1.4 NG/DL (ref 0.7–1.5)
TRIGL SERPL-MCNC: 112 MG/DL (ref ?–150)
TSH SERPL DL<=0.05 MIU/L-ACNC: 17.6 UIU/ML (ref 0.36–3.74)
URATE SERPL-MCNC: 8.3 MG/DL (ref 2.6–7.2)
VLDLC SERPL CALC-MCNC: 22.4 MG/DL
WBC # BLD AUTO: 7.1 K/UL (ref 4.6–13.2)

## 2021-11-16 PROCEDURE — 80061 LIPID PANEL: CPT

## 2021-11-16 PROCEDURE — 82043 UR ALBUMIN QUANTITATIVE: CPT

## 2021-11-16 PROCEDURE — 83970 ASSAY OF PARATHORMONE: CPT

## 2021-11-16 PROCEDURE — 85025 COMPLETE CBC W/AUTO DIFF WBC: CPT

## 2021-11-16 PROCEDURE — 82306 VITAMIN D 25 HYDROXY: CPT

## 2021-11-16 PROCEDURE — 83036 HEMOGLOBIN GLYCOSYLATED A1C: CPT

## 2021-11-16 PROCEDURE — 84550 ASSAY OF BLOOD/URIC ACID: CPT

## 2021-11-16 PROCEDURE — 80053 COMPREHEN METABOLIC PANEL: CPT

## 2021-11-16 PROCEDURE — 84439 ASSAY OF FREE THYROXINE: CPT

## 2021-11-16 PROCEDURE — 36415 COLL VENOUS BLD VENIPUNCTURE: CPT

## 2021-11-16 PROCEDURE — 80069 RENAL FUNCTION PANEL: CPT

## 2021-11-24 ENCOUNTER — TELEPHONE (OUTPATIENT)
Dept: INTERNAL MEDICINE CLINIC | Age: 61
End: 2021-11-24

## 2021-11-24 DIAGNOSIS — E03.9 ACQUIRED HYPOTHYROIDISM: Primary | ICD-10-CM

## 2021-11-24 NOTE — PROGRESS NOTES
Please let her know that her labs show her total cholesterol is 204. Triglycerides are 112. HDL is 45. Her LDL is 136, down from 186 five months ago. Her TSH is elevated at 17.6. Free T4 is unchanged at 1.4. I am ordering a follow-up labs to reassess her TFTs. If they do not look any better, I will need to adjust her thyroid medication. Her kidney function labs are at her baseline. There are no new findings. Her uric acid level is 8.3, down from 8.8 in June. Her A1c is up to 6.2 from 6. Her CBC is unremarkable. She should continue taking all medication as prescribed per these results. Please schedule her for labs in a month to recheck her TFTs.     Dr. West Guard  [facility], [address] [place] [address], [address] [place] [address] Str.  Phone: [phone]  Fax: [phone]

## 2021-11-24 NOTE — TELEPHONE ENCOUNTER
----- Message from Omar Champion MD sent at 11/24/2021  2:10 PM EST -----  Please let her know that her labs show her total cholesterol is 204. Triglycerides are 112. HDL is 45. Her LDL is 136, down from 186 five months ago. Her TSH is elevated at 17.6. Free T4 is unchanged at 1.4. I am ordering a follow-up labs to reassess her TFTs. If they do not look any better, I will need to adjust her thyroid medication. Her kidney function labs are at her baseline. There are no new findings. Her uric acid level is 8.3, down from 8.8 in June. Her A1c is up to 6.2 from 6. Her CBC is unremarkable. She should continue taking all medication as prescribed per these results. Please schedule her for labs in a month to recheck her TFTs.     Dr. Bree Schmitt  [facility] of [address], [address] [place] [address] Str.  Phone: [phone]  Fax: [phone]

## 2022-03-10 ENCOUNTER — TRANSCRIBE ORDER (OUTPATIENT)
Dept: SCHEDULING | Age: 62
End: 2022-03-10

## 2022-03-10 DIAGNOSIS — Z12.31 OTHER SCREENING MAMMOGRAM: Primary | ICD-10-CM

## 2022-03-18 PROBLEM — K21.9 GERD (GASTROESOPHAGEAL REFLUX DISEASE): Status: ACTIVE | Noted: 2019-11-23

## 2022-03-18 PROBLEM — N25.81 SECONDARY HYPERPARATHYROIDISM OF RENAL ORIGIN (HCC): Status: ACTIVE | Noted: 2020-06-27

## 2022-03-18 PROBLEM — I10 HTN (HYPERTENSION): Status: ACTIVE | Noted: 2019-11-23

## 2022-03-18 PROBLEM — E55.9 VITAMIN D DEFICIENCY: Status: ACTIVE | Noted: 2019-11-23

## 2022-03-18 PROBLEM — M10.9 GOUT: Status: ACTIVE | Noted: 2020-06-27

## 2022-03-19 PROBLEM — C50.911 BREAST CANCER, RIGHT (HCC): Status: ACTIVE | Noted: 2020-06-27

## 2022-03-19 PROBLEM — E03.9 ACQUIRED HYPOTHYROIDISM: Status: ACTIVE | Noted: 2019-11-23

## 2022-03-19 PROBLEM — C18.6 MALIGNANT TUMOR OF DESCENDING COLON (HCC): Status: ACTIVE | Noted: 2019-07-22

## 2022-03-19 PROBLEM — N18.4 CHRONIC KIDNEY DISEASE (CKD) STAGE G4/A1, SEVERELY DECREASED GLOMERULAR FILTRATION RATE (GFR) BETWEEN 15-29 ML/MIN/1.73 SQUARE METER AND ALBUMINURIA CREATININE RATIO LESS THAN 30 MG/G (HCC): Status: ACTIVE | Noted: 2017-04-13

## 2022-03-19 PROBLEM — D63.1 ANEMIA IN CHRONIC KIDNEY DISEASE: Status: ACTIVE | Noted: 2020-06-27

## 2022-03-19 PROBLEM — E78.00 HYPERCHOLESTEREMIA: Status: ACTIVE | Noted: 2019-11-23

## 2022-03-19 PROBLEM — N18.9 ANEMIA IN CHRONIC KIDNEY DISEASE: Status: ACTIVE | Noted: 2020-06-27

## 2022-03-20 PROBLEM — R91.1 PULMONARY NODULE: Status: ACTIVE | Noted: 2019-11-23

## 2022-03-20 PROBLEM — I50.22 CHRONIC SYSTOLIC CONGESTIVE HEART FAILURE (HCC): Status: ACTIVE | Noted: 2017-11-15

## 2022-03-24 ENCOUNTER — HOSPITAL ENCOUNTER (OUTPATIENT)
Dept: LAB | Age: 62
Discharge: HOME | End: 2022-03-24

## 2022-03-24 LAB — XX-LABCORP SPECIMEN COL,LCBCF: NORMAL

## 2022-03-24 PROCEDURE — 99001 SPECIMEN HANDLING PT-LAB: CPT

## 2022-04-06 DIAGNOSIS — N18.4 TYPE 2 DIABETES MELLITUS WITH STAGE 4 CHRONIC KIDNEY DISEASE, WITHOUT LONG-TERM CURRENT USE OF INSULIN (HCC): Primary | ICD-10-CM

## 2022-04-06 DIAGNOSIS — E03.9 ACQUIRED HYPOTHYROIDISM: ICD-10-CM

## 2022-04-06 DIAGNOSIS — D63.1 ANEMIA IN CHRONIC KIDNEY DISEASE, UNSPECIFIED CKD STAGE: ICD-10-CM

## 2022-04-06 DIAGNOSIS — E78.00 HYPERCHOLESTEREMIA: ICD-10-CM

## 2022-04-06 DIAGNOSIS — N25.81 SECONDARY HYPERPARATHYROIDISM OF RENAL ORIGIN (HCC): ICD-10-CM

## 2022-04-06 DIAGNOSIS — N18.9 ANEMIA IN CHRONIC KIDNEY DISEASE, UNSPECIFIED CKD STAGE: ICD-10-CM

## 2022-04-06 DIAGNOSIS — M10.9 GOUT, UNSPECIFIED CAUSE, UNSPECIFIED CHRONICITY, UNSPECIFIED SITE: ICD-10-CM

## 2022-04-06 DIAGNOSIS — N18.4 CHRONIC KIDNEY DISEASE (CKD) STAGE G4/A1, SEVERELY DECREASED GLOMERULAR FILTRATION RATE (GFR) BETWEEN 15-29 ML/MIN/1.73 SQUARE METER AND ALBUMINURIA CREATININE RATIO LESS THAN 30 MG/G (HCC): ICD-10-CM

## 2022-04-06 DIAGNOSIS — E11.22 TYPE 2 DIABETES MELLITUS WITH STAGE 4 CHRONIC KIDNEY DISEASE, WITHOUT LONG-TERM CURRENT USE OF INSULIN (HCC): Primary | ICD-10-CM

## 2022-04-07 RX ORDER — ATORVASTATIN CALCIUM 40 MG/1
TABLET, FILM COATED ORAL
Qty: 90 TABLET | Refills: 1 | Status: SHIPPED | OUTPATIENT
Start: 2022-04-07

## 2022-04-07 NOTE — TELEPHONE ENCOUNTER
Please schedule her for a 40 min apt for a check up.     Dr. Karla Biswas  [facility], [facility], [address] [place] [address] Str.  Phone: [phone]  Fax: [phone]

## 2022-04-07 NOTE — TELEPHONE ENCOUNTER
appt scheduled for 05/03/2022 at 1:40 for 40 minutes. Pt asking for labs orders and wants them mailed to her. Stated she is having some thyroid symptoms of sluggishness and weight gain. Are they labs ordered 10/26/2021 still good. Please advise and I will mail the lab orders.   Thank you

## 2022-04-11 NOTE — TELEPHONE ENCOUNTER
Please mail her a copy of her lab orders. Please have her get these labs prior to her upcoming appointment.     Dr. Justino Jeans  [facility] of [facility], [facility], [address] [place] [address] Str.  Phone: [phone]  Fax: [phone]

## 2022-05-24 ENCOUNTER — TRANSCRIBE ORDER (OUTPATIENT)
Dept: SCHEDULING | Age: 62
End: 2022-05-24

## 2022-05-24 ENCOUNTER — HOSPITAL ENCOUNTER (OUTPATIENT)
Dept: LAB | Age: 62
Discharge: HOME | End: 2022-05-24
Payer: COMMERCIAL

## 2022-05-24 ENCOUNTER — OFFICE VISIT (OUTPATIENT)
Dept: INTERNAL MEDICINE CLINIC | Age: 62
End: 2022-05-24
Payer: COMMERCIAL

## 2022-05-24 ENCOUNTER — TELEPHONE (OUTPATIENT)
Dept: INTERNAL MEDICINE CLINIC | Age: 62
End: 2022-05-24

## 2022-05-24 VITALS
HEIGHT: 66 IN | DIASTOLIC BLOOD PRESSURE: 77 MMHG | BODY MASS INDEX: 31.34 KG/M2 | WEIGHT: 195 LBS | RESPIRATION RATE: 14 BRPM | OXYGEN SATURATION: 98 % | SYSTOLIC BLOOD PRESSURE: 129 MMHG | TEMPERATURE: 97.7 F | HEART RATE: 69 BPM

## 2022-05-24 DIAGNOSIS — N39.0 URINARY TRACT INFECTION WITHOUT HEMATURIA, SITE UNSPECIFIED: Primary | ICD-10-CM

## 2022-05-24 DIAGNOSIS — Z12.31 VISIT FOR SCREENING MAMMOGRAM: Primary | ICD-10-CM

## 2022-05-24 DIAGNOSIS — N18.4 TYPE 2 DIABETES MELLITUS WITH STAGE 4 CHRONIC KIDNEY DISEASE, WITHOUT LONG-TERM CURRENT USE OF INSULIN (HCC): ICD-10-CM

## 2022-05-24 DIAGNOSIS — N18.4 CHRONIC KIDNEY DISEASE (CKD) STAGE G4/A1, SEVERELY DECREASED GLOMERULAR FILTRATION RATE (GFR) BETWEEN 15-29 ML/MIN/1.73 SQUARE METER AND ALBUMINURIA CREATININE RATIO LESS THAN 30 MG/G (HCC): ICD-10-CM

## 2022-05-24 DIAGNOSIS — I50.22 CHRONIC SYSTOLIC CONGESTIVE HEART FAILURE (HCC): ICD-10-CM

## 2022-05-24 DIAGNOSIS — M10.9 GOUT, UNSPECIFIED CAUSE, UNSPECIFIED CHRONICITY, UNSPECIFIED SITE: ICD-10-CM

## 2022-05-24 DIAGNOSIS — E11.22 TYPE 2 DIABETES MELLITUS WITH STAGE 4 CHRONIC KIDNEY DISEASE, WITHOUT LONG-TERM CURRENT USE OF INSULIN (HCC): ICD-10-CM

## 2022-05-24 DIAGNOSIS — C64.9 RENAL CELL CARCINOMA, UNSPECIFIED LATERALITY (HCC): ICD-10-CM

## 2022-05-24 DIAGNOSIS — N39.0 URINARY TRACT INFECTION WITHOUT HEMATURIA, SITE UNSPECIFIED: ICD-10-CM

## 2022-05-24 DIAGNOSIS — N25.81 SECONDARY HYPERPARATHYROIDISM OF RENAL ORIGIN (HCC): ICD-10-CM

## 2022-05-24 DIAGNOSIS — E03.9 ACQUIRED HYPOTHYROIDISM: ICD-10-CM

## 2022-05-24 LAB
BILIRUB UR QL STRIP: NEGATIVE
GLUCOSE UR-MCNC: NEGATIVE MG/DL
KETONES P FAST UR STRIP-MCNC: NEGATIVE MG/DL
PH UR STRIP: 6 [PH] (ref 4.6–8)
PROT UR QL STRIP: NEGATIVE
SP GR UR STRIP: 1.01 (ref 1–1.03)
UA UROBILINOGEN AMB POC: NORMAL (ref 0.2–1)
URINALYSIS CLARITY POC: CLEAR
URINALYSIS COLOR POC: YELLOW
URINE BLOOD POC: NORMAL
URINE LEUKOCYTES POC: NORMAL
URINE NITRITES POC: NEGATIVE

## 2022-05-24 PROCEDURE — 81001 URINALYSIS AUTO W/SCOPE: CPT | Performed by: INTERNAL MEDICINE

## 2022-05-24 PROCEDURE — 87077 CULTURE AEROBIC IDENTIFY: CPT

## 2022-05-24 PROCEDURE — 87086 URINE CULTURE/COLONY COUNT: CPT

## 2022-05-24 PROCEDURE — 99214 OFFICE O/P EST MOD 30 MIN: CPT | Performed by: INTERNAL MEDICINE

## 2022-05-24 PROCEDURE — 87186 SC STD MICRODIL/AGAR DIL: CPT

## 2022-05-24 RX ORDER — CIPROFLOXACIN 250 MG/1
250 TABLET, FILM COATED ORAL 2 TIMES DAILY
Qty: 10 TABLET | Refills: 0 | Status: SHIPPED | OUTPATIENT
Start: 2022-05-24 | End: 2022-06-08 | Stop reason: SDUPTHER

## 2022-05-24 RX ORDER — RIVAROXABAN 20 MG/1
TABLET, FILM COATED ORAL
COMMUNITY
Start: 2022-05-16

## 2022-05-24 NOTE — PROGRESS NOTES
Vickie Eckert presents today for   Chief Complaint   Patient presents with    Bladder Infection    Urinary Frequency     x 3 days without dysuria. Patient reports small amount of pinkish blood when wiping once. 1. \"Have you been to the ER, urgent care clinic since your last visit? Hospitalized since your last visit? \" no    2. \"Have you seen or consulted any other health care providers outside of the [address] [place] [address] since your last visit? \" yes     3. For patients aged 39-70: Has the patient had a colonoscopy / FIT/ Cologuard? Yes - no Care Gap present      If the patient is female:    4. For patients aged 41-77: Has the patient had a mammogram within the past 2 years? Yes - no Care Gap present  See top three    5. For patients aged 21-65: Has the patient had a pap smear?  No

## 2022-05-24 NOTE — TELEPHONE ENCOUNTER
----- Message from May Heading sent at 5/24/2022  9:12 AM EDT -----  Subject: Appointment Request    Reason for Call: Urgent Adult Urinary Problem    QUESTIONS  Type of Appointment? Established Patient  Reason for appointment request? No appointments available during search  Additional Information for Provider? screened urgent for appt pt says she   has UTI, screened green would like contacted as soon as possible  ---------------------------------------------------------------------------  --------------  CALL BACK INFO  What is the best way for the office to contact you? OK to leave message on   voicemail  Preferred Call Back Phone Number? [phone]  ---------------------------------------------------------------------------  --------------  SCRIPT ANSWERS  Relationship to Patient? Self  Have you recently (14 days) seen a provider for this problem? No  Have you been diagnosed with, awaiting test results for, or told that you   are suspected of having COVID-19 (Coronavirus)? (If patient has tested   negative or was tested as a requirement for work, school, or travel and   not based on symptoms, answer no)? No  Within the past 10 days have you developed any of the following symptoms   (answer no if symptoms have been present longer than 10 days or began   more than 10 days ago)? Fever or Chills, Cough, Shortness of breath or   difficulty breathing, Loss of taste or smell, Sore throat, Nasal   congestion, Sneezing or runny nose, Fatigue or generalized body aches   (answer no if pain is specific to a body part e.g. back pain), Diarrhea,   Headache? No  Have you had close contact with someone with COVID-19 in the last 7 days? No  (Service Expert - click yes below to proceed with [employer] As Usual   Scheduling)?  Yes

## 2022-05-24 NOTE — PROGRESS NOTES
INTERNISTS OF [facility] [place]:  5/24/2022, MRN: [id number]      Radha Crain is a 60 y.o. female and presents to clinic for Bladder Infection      Subjective: The patient is a 76-year-old female with history of right sided breast cancer s/p right mastectomy, colon cancer (2019) s/p surgery, b/l clear cell carcinoma s/p b/l partial nephrectomies, pulmonary nodule, chemotherapy induced CMO/CHF, CKD (followed by Angie Bagley), type 2 DM, CLARENCE, multinodular goiter treated with a thyroidectomy, Covid-19 infection,  pacemaker placement (2021), postoperative hypothyroidism, CLARENCE, HLD, GERD, vitamin D deficiency, and HTN.     1. UTI: At her last appointment in October, she had symptoms consistent with a UTI. She was given a course of ciprofloxacin. Symptoms resolved until recently in the past 3 days. Today she reports: she has a foul odor to her urine. No abdominal pain. No pain. She had a little \"pastel pink\" in her urine with wiping x 1 in the past 3 days. She had similar sx in the past with a UTI. 2. Type 2 DM: S/p courses of steroid rx. She is scheduled for labs soon. She is diet controlled. 3. CKD, H/o Kidney Cancer, and CHF: Next apt with Nephrology: 2 wks ago, was her last apt. She has a f/u apt in 6 months. Given her history of hypertension and CHF, she also takes Imdur, Coreg, Bumex, xarelto. She has a history of secondary hyperparathyroidism. She continues to take calcitriol. Incidentally, she also has a history of renal cell carcinoma. Next apt with Urology:  - in July. 4. Hypothyroidism: Taking Synthroid 112 mcg daily. Her last TFTs were abnormal.  Her TSH was elevated at 17.6. Her free T4 was unchanged at 1.4. +Gaining weight. +Fatigue. 5. Gout: She had right ankle pain (posteriorly). Pain was extremely severe but after two course of steroid rx, her sx resolved. She just completed a course of prednisone for presumed gout.  She has had UTIs with taking prednisone course in the past. She has had 2 bouts of gout since this yr. +Taking allopurinol. Patient Active Problem List    Diagnosis Date Noted    Anemia in chronic kidney disease 06/27/2020    Breast cancer, right ([facility] 06/27/2020    Gout 06/27/2020    Secondary hyperparathyroidism of renal origin ([facility] 06/27/2020    Hypercholesteremia 11/23/2019    GERD (gastroesophageal reflux disease) 11/23/2019    Vitamin D deficiency 11/23/2019    Acquired hypothyroidism 11/23/2019    HTN (hypertension) 11/23/2019    Pulmonary nodule 11/23/2019    Malignant tumor of descending colon ([facility] 07/22/2019    Chronic systolic congestive heart failure ([facility] 11/15/2017    Chronic kidney disease (CKD) stage G4/A1, severely decreased glomerular filtration rate (GFR) between 15-29 mL/min/1.73 square meter and albuminuria creatinine ratio less than 30 mg/g (HCC) 04/13/2017    Malignant neoplasm of left kidney ([facility] 04/04/2016    Clear cell carcinoma of kidney ([facility] 03/22/2016    Toxic multinodular goiter 12/04/2015    CLARENCE (obstructive sleep apnea) 11/09/2015    Chemotherapy induced cardiomyopathy ([facility] 08/11/2015    Type 2 diabetes mellitus with stage 4 chronic kidney disease, without long-term current use of insulin ([facility]) 08/03/2015       Current Outpatient Medications   Medication Sig Dispense Refill    levothyroxine (SYNTHROID) 112 mcg tablet TAKE 1 TABLET BY MOUTH EVERY DAY BEFORE BREAKFAST 90 Tablet 1    atorvastatin (LIPITOR) 40 mg tablet TAKE 1 TABLET BY MOUTH EVERY DAY 90 Tablet 1    bumetanide (BUMEX) 1 mg tablet Take 1 mg by mouth daily.  calcitRIOL (ROCALTROL) 0.25 mcg capsule TAKE 1 CAPSULE BY MOUTH EVERY DAY 90 Capsule 3    lansoprazole (PREVACID) 30 mg capsule TAKE 1 CAPSULE BY MOUTH EVERY DAY 90 Capsule 3    amiodarone (CORDARONE) 200 mg tablet TAKE 1 TABLET TWICE DAILY FOR 18 DAYS. THEN 1 TABLET DAILY.  isosorbide mononitrate ER (IMDUR) 30 mg tablet Take 30 mg by mouth daily.       DULoxetine (CYMBALTA) 60 mg capsule       apixaban (Eliquis) 5 mg tablet Take 5 mg by mouth two (2) times a day.  bumetanide (BUMEX) 0.5 mg tablet Take  by mouth daily.  allopurinoL (ZYLOPRIM) 100 mg tablet TAKE 1 TABLET BY MOUTH EVERY DAY      ondansetron (ZOFRAN ODT) 8 mg disintegrating tablet Take 1 Tab by mouth every eight (8) hours as needed for Nausea or Vomiting.  20 Tab 0    COREG 25 mg tablet Take 25 mg by mouth two (2) times a day.  0       Allergies   Allergen Reactions    Codeine Swelling, Other (comments) and Not Reported This Time     Throat Swelling    Penicillins Other (comments) and Angioedema     N/V, swelling    Tramadol Swelling    Sulfa (Sulfonamide Antibiotics) Other (comments)     itching    Sulfur Hives       Past Medical History:   Diagnosis Date    [facility]-fib [facility]     Cancer ([facility] 2000    Right breast ca     Colon cancer ([facility]     Congestive heart failure, unspecified     Gout     H/O total mastectomy of right breast     Heart disease     Hx: UTI (urinary tract infection)     Hypercholesterolemia     Iron deficiency anemia     Nausea & vomiting     Renal cell carcinoma of left kidney ([address] 12/17/15    Renal mass, left     Thyroid cancer ([address]     2017       Past Surgical History:   Procedure Laterality Date    HX CHOLECYSTECTOMY      HX MASTECTOMY Right     HX NEPHRECTOMY Right 3/22/16    Partial x2, Dr. Remy An, [facility]    HX ORTHOPAEDIC  04/19/2017    Trigger finger release R hand    HX RENAL BIOPSY Right 12/17/15    Ct guided bx, Dr. Jose R Moreira, [facility]    HX THYROIDECTOMY  6/20/2018    IR INSERT TUNL CVC W PORT OVER 5 YEARS  10/15/2019    IR REMOVE TUNL CVAD W PORT/PUMP  3/5/2021       Family History   Problem Relation Age of Onset    Hypertension Father     Heart Attack Father     Heart Failure Father     Diabetes Father        Social History     Tobacco Use    Smoking status: Never Smoker    Smokeless tobacco: Never Used   Substance Use Topics    Alcohol use: No       ROS Review of Systems   Constitutional: Negative for chills and fever. HENT: Negative for ear pain and sore throat. Eyes: Negative for blurred vision and pain. Respiratory: Negative for cough and shortness of breath. Cardiovascular: Negative for chest pain. Gastrointestinal: Negative for abdominal pain, blood in stool and melena. Genitourinary: Positive for hematuria. Negative for dysuria. Musculoskeletal: Negative for joint pain and myalgias. Skin: Negative for rash. Neurological: Negative for headaches. Endo/Heme/Allergies: Does not bruise/bleed easily. Psychiatric/Behavioral: Negative for substance abuse. Objective     Vitals:    05/24/22 1159   Resp: 14   Temp: 97.7 °F (36.5 °C)   TempSrc: Temporal   Weight: 195 lb (88.5 kg)   Height: 5' 6\" (1.676 m)       Physical Exam  Vitals and nursing note reviewed. HENT:      Head: Normocephalic and atraumatic. Right Ear: External ear normal.      Left Ear: External ear normal.   Eyes:      General: No scleral icterus. Right eye: No discharge. Left eye: No discharge. Conjunctiva/sclera: Conjunctivae normal.   Cardiovascular:      Rate and Rhythm: Normal rate and regular rhythm. Heart sounds: Normal heart sounds. No murmur heard. No friction rub. No gallop. Pulmonary:      Effort: Pulmonary effort is normal. No respiratory distress. Breath sounds: Normal breath sounds. No wheezing or rales. Abdominal:      General: Bowel sounds are normal. There is no distension. Palpations: Abdomen is soft. There is no mass. Tenderness: There is no abdominal tenderness. There is no guarding or rebound. Musculoskeletal:         General: No swelling (BUE) or tenderness (BUE). Cervical back: Neck supple. Lymphadenopathy:      Cervical: No cervical adenopathy. Skin:     General: Skin is warm and dry. Findings: No erythema or rash. Neurological:      Mental Status: She is alert.       Motor: No abnormal muscle tone.       Gait: Gait normal.   Psychiatric:         Mood and Affect: Mood normal.         LABS   Data Review:   Lab Results   Component Value Date/Time    WBC 7.1 11/16/2021 03:44 PM    HGB 13.6 11/16/2021 03:44 PM    HCT 43.0 11/16/2021 03:44 PM    PLATELET 728 10/38/8666 03:44 PM    MCV 96.4 11/16/2021 03:44 PM       Lab Results   Component Value Date/Time    Sodium 140 11/16/2021 04:00 PM    Potassium 3.8 11/16/2021 04:00 PM    Chloride 106 11/16/2021 04:00 PM    CO2 26 11/16/2021 04:00 PM    Anion gap 8 11/16/2021 04:00 PM    Glucose 84 11/16/2021 04:00 PM    BUN 44 (H) 11/16/2021 04:00 PM    Creatinine 2.45 (H) 11/16/2021 04:00 PM    BUN/Creatinine ratio 18 11/16/2021 04:00 PM    GFR est AA 24 (L) 11/16/2021 04:00 PM    GFR est non-AA 20 (L) 11/16/2021 04:00 PM    Calcium 9.0 11/16/2021 04:01 PM       Lab Results   Component Value Date/Time    Cholesterol, total 204 (H) 11/16/2021 03:44 PM    HDL Cholesterol 45 11/16/2021 03:44 PM    LDL, calculated 136.6 (H) 11/16/2021 03:44 PM    VLDL, calculated 22.4 11/16/2021 03:44 PM    Triglyceride 112 11/16/2021 03:44 PM    CHOL/HDL Ratio 4.5 11/16/2021 03:44 PM       Lab Results   Component Value Date/Time    Hemoglobin A1c 6.2 (H) 11/16/2021 03:44 PM    Hemoglobin A1c (POC) 6.2 07/31/2015 12:27 PM     Results for orders placed or performed in visit on 05/24/22   AMB POC URINALYSIS DIP STICK AUTO W/ MICRO     Status: None   Result Value Ref Range Status    Color (UA POC) Yellow  Final    Clarity (UA POC) Clear  Final    Glucose (UA POC) Negative Negative Final    Bilirubin (UA POC) Negative Negative Final    Ketones (UA POC) Negative Negative Final    Specific gravity (UA POC) 1.015 1.001 - 1.035 Final    Blood (UA POC) Trace Negative Final    pH (UA POC) 6.0 4.6 - 8.0 Final    Protein (UA POC) Negative Negative Final    Urobilinogen (UA POC) 0.2 mg/dL 0.2 - 1 Final    Nitrites (UA POC) Negative Negative Final    Leukocyte esterase (UA POC) Trace Negative Final   Results for orders placed or performed in visit on 06/24/20   AMB POC URINALYSIS DIP STICK AUTO W/O MICRO     Status: None   Result Value Ref Range Status    Color (UA POC) Yellow  Final    Clarity (UA POC) Clear  Final    Glucose (UA POC) Negative Negative Final    Bilirubin (UA POC) Negative Negative Final    Ketones (UA POC) Negative Negative Final    Specific gravity (UA POC) 1.015 1.001 - 1.035 Final    Blood (UA POC) Negative Negative Final    pH (UA POC) 7.0 4.6 - 8.0 Final    Protein (UA POC) 1+ Negative Final    Urobilinogen (UA POC) 0.2 mg/dL 0.2 - 1 Final    Nitrites (UA POC) Negative Negative Final    Leukocyte esterase (UA POC) 1+ Negative Final         Assessment/Plan:   1. Urinary tract infection without hematuria: Per UA results - she is positive for leukocyte esterase. +H/o CKD with secondary hyperparathyroidism. +Kidney cancer (in remission. +Secondary hyperparathyroidism. +CHF.   - C/w rx as prescribed. - Ordering ciprofloxacin  - Urine cx ordered  - I encouraged her to f/u with her nephrologist and urologist for routine check ups. ORDERS:  - AMB POC URINALYSIS DIP STICK AUTO W/ MICRO  - CULTURE, URINE; Future    2. Type 2 diabetes mellitus: Well controlled. S/p multiple steroid courses though  - Low carb diet  - Checking labs soon to assess her A1C    3. Hypothyroidism: Stable. - C/w rx as prescribed. - Checking her TFTs soon. 4. Gout: Asymptomatic today. - C/w allopurinol        Health Maintenance Due   Topic Date Due    COVID-19 Vaccine (1) Never done    Cervical cancer screen  Never done    Shingrix Vaccine Age 50> (1 of 2) Never done    Eye Exam Retinal or Dilated  12/19/2018    Foot Exam Q1  06/06/2020    Breast Cancer Screen Mammogram  10/30/2021     Lab review: labs are reviewed and ordered as mentioned above    I have discussed the diagnosis with the patient and the intended plan as seen in the above orders.   The patient has received an after-visit summary and questions were answered concerning future plans. I have discussed medication side effects and warnings with the patient as well. I have reviewed the plan of care with the patient, accepted their input and they are in agreement with the treatment goals. All questions were answered. The patient understands the plan of care. Handouts provided today with above information. Pt instructed if symptoms worsen to call the office or report to the ED for continued care. Greater than 50% of the visit time was spent in counseling and/or coordination of care. Voice recognition was used to generate this report, which may have resulted in some phonetic based errors in grammar and contents. Even though attempts were made to correct all the mistakes, some may have been missed, and remained in the body of the document.           Mike Gonzalez MD

## 2022-05-24 NOTE — PATIENT INSTRUCTIONS
Urinary Tract Infection (UTI) in Women: Care Instructions  Overview     A urinary tract infection, or UTI, is a general term for an infection anywhere between the kidneys and the urethra (where urine comes out). Most UTIs are bladder infections. They often cause pain or burning when you urinate. UTIs are caused by bacteria and can be cured with antibiotics. Be sure to complete your treatment so that the infection does not get worse. Follow-up care is a key part of your treatment and safety. Be sure to make and go to all appointments, and call your doctor if you are having problems. It's also a good idea to know your test results and keep a list of the medicines you take. How can you care for yourself at home? · Take your antibiotics as directed. Do not stop taking them just because you feel better. You need to take the full course of antibiotics. · Drink extra water and other fluids for the next day or two. This will help make the urine less concentrated and help wash out the bacteria that are causing the infection. (If you have kidney, heart, or liver disease and have to limit fluids, talk with your doctor before you increase the amount of fluids you drink.)  · Avoid drinks that are carbonated or have caffeine. They can irritate the bladder. · Urinate often. Try to empty your bladder each time. · To relieve pain, take a hot bath or lay a heating pad set on low over your lower belly or genital area. Never go to sleep with a heating pad in place. To prevent UTIs  · Drink plenty of water each day. This helps you urinate often, which clears bacteria from your system. (If you have kidney, heart, or liver disease and have to limit fluids, talk with your doctor before you increase the amount of fluids you drink.)  · Urinate when you need to. · If you are sexually active, urinate right after you have sex. · Change sanitary pads often.   · Avoid douches, bubble baths, feminine hygiene sprays, and other feminine hygiene products that have deodorants. · After going to the bathroom, wipe from front to back. When should you call for help? Call your doctor now or seek immediate medical care if:    · Symptoms such as fever, chills, nausea, or vomiting get worse or appear for the first time.     · You have new pain in your back just below your rib cage. This is called flank pain.     · There is new blood or pus in your urine.     · You have any problems with your antibiotic medicine. Watch closely for changes in your health, and be sure to contact your doctor if:    · You are not getting better after taking an antibiotic for 2 days.     · Your symptoms go away but then come back. Where can you learn more? Go to http://www.gray.com/  Enter B523 in the search box to learn more about \"Urinary Tract Infection (UTI) in Women: Care Instructions. \"  Current as of: October 18, 2021               Content Version: 13.2  © 2006-2022 [employer]. Care instructions adapted under license by [employer] (which disclaims liability or warranty for this information). If you have questions about a medical condition or this instruction, always ask your healthcare professional. [address] any warranty or liability for your use of this information.

## 2022-05-27 LAB
BACTERIA SPEC CULT: ABNORMAL
CC UR VC: ABNORMAL
SERVICE CMNT-IMP: ABNORMAL

## 2022-06-01 ENCOUNTER — HOSPITAL ENCOUNTER (OUTPATIENT)
Dept: LAB | Age: 62
Discharge: HOME | End: 2022-06-01
Payer: COMMERCIAL

## 2022-06-01 ENCOUNTER — APPOINTMENT (OUTPATIENT)
Dept: INTERNAL MEDICINE CLINIC | Age: 62
End: 2022-06-01

## 2022-06-01 DIAGNOSIS — M10.9 GOUT, UNSPECIFIED CAUSE, UNSPECIFIED CHRONICITY, UNSPECIFIED SITE: ICD-10-CM

## 2022-06-01 DIAGNOSIS — N25.81 SECONDARY HYPERPARATHYROIDISM OF RENAL ORIGIN (HCC): ICD-10-CM

## 2022-06-01 DIAGNOSIS — E78.00 HYPERCHOLESTEREMIA: ICD-10-CM

## 2022-06-01 DIAGNOSIS — E03.9 ACQUIRED HYPOTHYROIDISM: ICD-10-CM

## 2022-06-01 DIAGNOSIS — D63.1 ANEMIA IN CHRONIC KIDNEY DISEASE, UNSPECIFIED CKD STAGE: ICD-10-CM

## 2022-06-01 DIAGNOSIS — N18.9 ANEMIA IN CHRONIC KIDNEY DISEASE, UNSPECIFIED CKD STAGE: ICD-10-CM

## 2022-06-01 DIAGNOSIS — N18.4 TYPE 2 DIABETES MELLITUS WITH STAGE 4 CHRONIC KIDNEY DISEASE, WITHOUT LONG-TERM CURRENT USE OF INSULIN (HCC): ICD-10-CM

## 2022-06-01 DIAGNOSIS — E11.22 TYPE 2 DIABETES MELLITUS WITH STAGE 4 CHRONIC KIDNEY DISEASE, WITHOUT LONG-TERM CURRENT USE OF INSULIN (HCC): ICD-10-CM

## 2022-06-01 DIAGNOSIS — N18.4 CHRONIC KIDNEY DISEASE (CKD) STAGE G4/A1, SEVERELY DECREASED GLOMERULAR FILTRATION RATE (GFR) BETWEEN 15-29 ML/MIN/1.73 SQUARE METER AND ALBUMINURIA CREATININE RATIO LESS THAN 30 MG/G (HCC): ICD-10-CM

## 2022-06-01 LAB
ALBUMIN SERPL-MCNC: 3.7 G/DL (ref 3.4–5)
ALBUMIN/GLOB SERPL: 1.3 {RATIO} (ref 0.8–1.7)
ALP SERPL-CCNC: 408 U/L (ref 45–117)
ALT SERPL-CCNC: 66 U/L (ref 13–56)
ANION GAP SERPL CALC-SCNC: 9 MMOL/L (ref 3–18)
AST SERPL-CCNC: 46 U/L (ref 10–38)
BASOPHILS # BLD: 0.1 K/UL (ref 0–0.1)
BASOPHILS NFR BLD: 1 % (ref 0–2)
BILIRUB SERPL-MCNC: 0.8 MG/DL (ref 0.2–1)
BUN SERPL-MCNC: 42 MG/DL (ref 7–18)
BUN/CREAT SERPL: 16 (ref 12–20)
CALCIUM SERPL-MCNC: 9.5 MG/DL (ref 8.5–10.1)
CHLORIDE SERPL-SCNC: 108 MMOL/L (ref 100–111)
CHOLEST SERPL-MCNC: 196 MG/DL
CO2 SERPL-SCNC: 25 MMOL/L (ref 21–32)
CREAT SERPL-MCNC: 2.63 MG/DL (ref 0.6–1.3)
CREAT UR-MCNC: 130 MG/DL (ref 30–125)
DIFFERENTIAL METHOD BLD: ABNORMAL
EOSINOPHIL # BLD: 0.1 K/UL (ref 0–0.4)
EOSINOPHIL NFR BLD: 1 % (ref 0–5)
ERYTHROCYTE [DISTWIDTH] IN BLOOD BY AUTOMATED COUNT: 15.1 % (ref 11.6–14.5)
EST. AVERAGE GLUCOSE BLD GHB EST-MCNC: 140 MG/DL
FERRITIN SERPL-MCNC: 103 NG/ML (ref 8–388)
GLOBULIN SER CALC-MCNC: 2.8 G/DL (ref 2–4)
GLUCOSE SERPL-MCNC: 127 MG/DL (ref 74–99)
HBA1C MFR BLD: 6.5 % (ref 4.2–5.6)
HCT VFR BLD AUTO: 41.8 % (ref 35–45)
HDLC SERPL-MCNC: 36 MG/DL (ref 40–60)
HDLC SERPL: 5.4 {RATIO} (ref 0–5)
HGB BLD-MCNC: 13.5 G/DL (ref 12–16)
IMM GRANULOCYTES # BLD AUTO: 0 K/UL (ref 0–0.04)
IMM GRANULOCYTES NFR BLD AUTO: 0 % (ref 0–0.5)
IRON SATN MFR SERPL: 23 % (ref 20–50)
IRON SERPL-MCNC: 71 UG/DL (ref 50–175)
LDLC SERPL CALC-MCNC: 117.2 MG/DL (ref 0–100)
LIPID PROFILE,FLP: ABNORMAL
LYMPHOCYTES # BLD: 1.5 K/UL (ref 0.9–3.6)
LYMPHOCYTES NFR BLD: 19 % (ref 21–52)
MCH RBC QN AUTO: 31.6 PG (ref 24–34)
MCHC RBC AUTO-ENTMCNC: 32.3 G/DL (ref 31–37)
MCV RBC AUTO: 97.9 FL (ref 78–100)
MICROALBUMIN UR-MCNC: 3.39 MG/DL (ref 0–3)
MICROALBUMIN/CREAT UR-RTO: 26 MG/G (ref 0–30)
MONOCYTES # BLD: 0.5 K/UL (ref 0.05–1.2)
MONOCYTES NFR BLD: 6 % (ref 3–10)
NEUTS SEG # BLD: 5.5 K/UL (ref 1.8–8)
NEUTS SEG NFR BLD: 72 % (ref 40–73)
NRBC # BLD: 0 K/UL (ref 0–0.01)
NRBC BLD-RTO: 0 PER 100 WBC
PLATELET # BLD AUTO: 202 K/UL (ref 135–420)
PMV BLD AUTO: 10.8 FL (ref 9.2–11.8)
POTASSIUM SERPL-SCNC: 3.8 MMOL/L (ref 3.5–5.5)
PROT SERPL-MCNC: 6.5 G/DL (ref 6.4–8.2)
RBC # BLD AUTO: 4.27 M/UL (ref 4.2–5.3)
SODIUM SERPL-SCNC: 142 MMOL/L (ref 136–145)
T4 FREE SERPL-MCNC: 1.5 NG/DL (ref 0.7–1.5)
TIBC SERPL-MCNC: 311 UG/DL (ref 250–450)
TRIGL SERPL-MCNC: 214 MG/DL (ref ?–150)
TSH SERPL DL<=0.05 MIU/L-ACNC: 7.91 UIU/ML (ref 0.36–3.74)
URATE SERPL-MCNC: 9.4 MG/DL (ref 2.6–7.2)
VLDLC SERPL CALC-MCNC: 42.8 MG/DL
WBC # BLD AUTO: 7.7 K/UL (ref 4.6–13.2)

## 2022-06-01 PROCEDURE — 85025 COMPLETE CBC W/AUTO DIFF WBC: CPT

## 2022-06-01 PROCEDURE — 83970 ASSAY OF PARATHORMONE: CPT

## 2022-06-01 PROCEDURE — 82306 VITAMIN D 25 HYDROXY: CPT

## 2022-06-01 PROCEDURE — 82043 UR ALBUMIN QUANTITATIVE: CPT

## 2022-06-01 PROCEDURE — 84550 ASSAY OF BLOOD/URIC ACID: CPT

## 2022-06-01 PROCEDURE — 83036 HEMOGLOBIN GLYCOSYLATED A1C: CPT

## 2022-06-01 PROCEDURE — 84439 ASSAY OF FREE THYROXINE: CPT

## 2022-06-01 PROCEDURE — 36415 COLL VENOUS BLD VENIPUNCTURE: CPT

## 2022-06-01 PROCEDURE — 83540 ASSAY OF IRON: CPT

## 2022-06-01 PROCEDURE — 80053 COMPREHEN METABOLIC PANEL: CPT

## 2022-06-01 PROCEDURE — 80061 LIPID PANEL: CPT

## 2022-06-01 PROCEDURE — 82728 ASSAY OF FERRITIN: CPT

## 2022-06-02 LAB
25(OH)D3 SERPL-MCNC: 32.9 NG/ML (ref 30–100)
CALCIUM SERPL-MCNC: 9.4 MG/DL (ref 8.5–10.1)
PTH-INTACT SERPL-MCNC: 251.3 PG/ML (ref 18.4–88)

## 2022-06-07 NOTE — PROGRESS NOTES
I will discuss her labs with her at her upcoming appointment tomorrow. Her calcium is normal at 9.4. Her PTH is elevated at 251. Her vitamin D level is normal at 32. There is no microalbuminuria. Her ferritin level is 103. Her total cholesterol is 196, down from 204 six months ago. Her triglycerides are 214. HDL was 36. LDL is down to 117 from 136. Her iron labs are normal.  Her uric acid level is very high at 9.4. It was 8.3 last year. Her TSH is elevated at 7.91. It was 17.6 in November. Her free T4 is high normal at 1.5. Her creatinine is at her baseline of 2.63. BUN is 42. Her LFTs are abnormal.  Her ALT is 66 and her AST is 46. Her alkaline phosphatase level is 408. Her A1c is 6.5. Her CBC does not show any significant abnormalities.     Dr. Desiree Cantrell  Internists of [address] [place] [address], [address] [place] [address], [facility] [address]  Phone: [phone]  Fax: [phone]

## 2022-06-08 ENCOUNTER — HOSPITAL ENCOUNTER (OUTPATIENT)
Dept: LAB | Age: 62
Discharge: HOME | End: 2022-06-08
Payer: COMMERCIAL

## 2022-06-08 ENCOUNTER — APPOINTMENT (OUTPATIENT)
Dept: INTERNAL MEDICINE CLINIC | Age: 62
End: 2022-06-08

## 2022-06-08 ENCOUNTER — OFFICE VISIT (OUTPATIENT)
Dept: INTERNAL MEDICINE CLINIC | Age: 62
End: 2022-06-08
Payer: COMMERCIAL

## 2022-06-08 VITALS
RESPIRATION RATE: 18 BRPM | HEART RATE: 69 BPM | BODY MASS INDEX: 30.86 KG/M2 | DIASTOLIC BLOOD PRESSURE: 88 MMHG | HEIGHT: 66 IN | SYSTOLIC BLOOD PRESSURE: 103 MMHG | TEMPERATURE: 97.8 F | WEIGHT: 192 LBS | OXYGEN SATURATION: 97 %

## 2022-06-08 DIAGNOSIS — I50.22 CHRONIC SYSTOLIC CONGESTIVE HEART FAILURE (HCC): ICD-10-CM

## 2022-06-08 DIAGNOSIS — Z87.448 HISTORY OF HEMATURIA: ICD-10-CM

## 2022-06-08 DIAGNOSIS — E03.9 ACQUIRED HYPOTHYROIDISM: ICD-10-CM

## 2022-06-08 DIAGNOSIS — N39.0 URINARY TRACT INFECTION WITHOUT HEMATURIA, SITE UNSPECIFIED: ICD-10-CM

## 2022-06-08 DIAGNOSIS — E11.22 TYPE 2 DIABETES MELLITUS WITH STAGE 4 CHRONIC KIDNEY DISEASE, WITHOUT LONG-TERM CURRENT USE OF INSULIN (HCC): ICD-10-CM

## 2022-06-08 DIAGNOSIS — C64.9 RENAL CELL CARCINOMA, UNSPECIFIED LATERALITY (HCC): ICD-10-CM

## 2022-06-08 DIAGNOSIS — R74.8 ELEVATED ALKALINE PHOSPHATASE LEVEL: Primary | ICD-10-CM

## 2022-06-08 DIAGNOSIS — N18.4 TYPE 2 DIABETES MELLITUS WITH STAGE 4 CHRONIC KIDNEY DISEASE, WITHOUT LONG-TERM CURRENT USE OF INSULIN (HCC): ICD-10-CM

## 2022-06-08 DIAGNOSIS — R74.8 ELEVATED ALKALINE PHOSPHATASE LEVEL: ICD-10-CM

## 2022-06-08 DIAGNOSIS — M54.50 LOW BACK PAIN, UNSPECIFIED BACK PAIN LATERALITY, UNSPECIFIED CHRONICITY, UNSPECIFIED WHETHER SCIATICA PRESENT: ICD-10-CM

## 2022-06-08 DIAGNOSIS — G47.33 OSA (OBSTRUCTIVE SLEEP APNEA): ICD-10-CM

## 2022-06-08 LAB
BILIRUB UR QL STRIP: NEGATIVE
GLUCOSE UR-MCNC: NEGATIVE MG/DL
KETONES P FAST UR STRIP-MCNC: NEGATIVE MG/DL
PH UR STRIP: 6 [PH] (ref 4.6–8)
PROT UR QL STRIP: NORMAL
SP GR UR STRIP: 1.01 (ref 1–1.03)
T4 FREE SERPL-MCNC: 1.7 NG/DL (ref 0.7–1.5)
TSH SERPL DL<=0.05 MIU/L-ACNC: 5.7 UIU/ML (ref 0.36–3.74)
UA UROBILINOGEN AMB POC: NORMAL (ref 0.2–1)
URINALYSIS CLARITY POC: CLEAR
URINALYSIS COLOR POC: YELLOW
URINE BLOOD POC: NEGATIVE
URINE LEUKOCYTES POC: NORMAL
URINE NITRITES POC: NEGATIVE

## 2022-06-08 PROCEDURE — 87086 URINE CULTURE/COLONY COUNT: CPT

## 2022-06-08 PROCEDURE — 81001 URINALYSIS AUTO W/SCOPE: CPT | Performed by: INTERNAL MEDICINE

## 2022-06-08 PROCEDURE — 99214 OFFICE O/P EST MOD 30 MIN: CPT | Performed by: INTERNAL MEDICINE

## 2022-06-08 PROCEDURE — 84080 ASSAY ALKALINE PHOSPHATASES: CPT

## 2022-06-08 PROCEDURE — 3044F HG A1C LEVEL LT 7.0%: CPT | Performed by: INTERNAL MEDICINE

## 2022-06-08 PROCEDURE — 84439 ASSAY OF FREE THYROXINE: CPT

## 2022-06-08 PROCEDURE — 36415 COLL VENOUS BLD VENIPUNCTURE: CPT

## 2022-06-08 RX ORDER — CIPROFLOXACIN 250 MG/1
250 TABLET, FILM COATED ORAL 2 TIMES DAILY
Qty: 14 TABLET | Refills: 0 | Status: SHIPPED | OUTPATIENT
Start: 2022-06-08 | End: 2022-06-15

## 2022-06-08 RX ORDER — LEVOTHYROXINE SODIUM 125 UG/1
125 TABLET ORAL
Qty: 30 TABLET | Refills: 5 | Status: SHIPPED | OUTPATIENT
Start: 2022-06-08 | End: 2022-07-12

## 2022-06-08 RX ORDER — CALCITRIOL 0.25 UG/1
CAPSULE ORAL
Qty: 90 CAPSULE | Refills: 3 | Status: SHIPPED | OUTPATIENT
Start: 2022-06-08

## 2022-06-08 RX ORDER — LANSOPRAZOLE 30 MG/1
CAPSULE, DELAYED RELEASE ORAL
Qty: 90 CAPSULE | Refills: 3 | Status: SHIPPED | OUTPATIENT
Start: 2022-06-08

## 2022-06-08 RX ORDER — LOSARTAN POTASSIUM 25 MG/1
TABLET ORAL DAILY
COMMUNITY

## 2022-06-08 RX ORDER — FUROSEMIDE 40 MG/1
TABLET ORAL DAILY
COMMUNITY

## 2022-06-08 NOTE — PATIENT INSTRUCTIONS
Results for Pallavi Prabhakar (MRN [id number]) as of 6/8/2022 11:48   Ref. Range 6/1/2022 10:45   WBC Latest Ref Range: 4.6 - 13.2 K/uL 7.7   NRBC Latest Ref Range: 0  WBC 0.0   RBC Latest Ref Range: 4.20 - 5.30 M/uL 4.27   HGB Latest Ref Range: 12.0 - 16.0 g/dL 13.5   HCT Latest Ref Range: 35.0 - 45.0 % 41.8   MCV Latest Ref Range: 78.0 - 100.0 FL 97.9   MCH Latest Ref Range: 24.0 - 34.0 PG 31.6   MCHC Latest Ref Range: 31.0 - 37.0 g/dL 32.3   RDW Latest Ref Range: 11.6 - 14.5 % 15.1 (H)   PLATELET Latest Ref Range: 135 - 420 K/uL 202   MPV Latest Ref Range: 9.2 - 11.8 FL 10.8   NEUTROPHILS Latest Ref Range: 40 - 73 % 72   LYMPHOCYTES Latest Ref Range: 21 - 52 % 19 (L)   MONOCYTES Latest Ref Range: 3 - 10 % 6   EOSINOPHILS Latest Ref Range: 0 - 5 % 1   BASOPHILS Latest Ref Range: 0 - 2 % 1   IMMATURE GRANULOCYTES Latest Ref Range: 0.0 - 0.5 % 0   DF Latest Units:   AUTOMATED   ABSOLUTE NRBC Latest Ref Range: 0.00 - 0.01 K/uL 0.00   ABS. NEUTROPHILS Latest Ref Range: 1.8 - 8.0 K/UL 5.5   ABS. IMM. GRANS. Latest Ref Range: 0.00 - 0.04 K/UL 0.0   ABS. LYMPHOCYTES Latest Ref Range: 0.9 - 3.6 K/UL 1.5   ABS. MONOCYTES Latest Ref Range: 0.05 - 1.2 K/UL 0.5   ABS. EOSINOPHILS Latest Ref Range: 0.0 - 0.4 K/UL 0.1   ABS.  BASOPHILS Latest Ref Range: 0.0 - 0.1 K/UL 0.1   Sodium Latest Ref Range: 136 - 145 mmol/L 142   Potassium Latest Ref Range: 3.5 - 5.5 mmol/L 3.8   Chloride Latest Ref Range: 100 - 111 mmol/L 108   CO2 Latest Ref Range: 21 - 32 mmol/L 25   Anion gap Latest Ref Range: 3.0 - 18 mmol/L 9   Glucose Latest Ref Range: 74 - 99 mg/dL 127 (H)   BUN Latest Ref Range: 7.0 - 18 MG/DL 42 (H)   Creatinine Latest Ref Range: 0.6 - 1.3 MG/DL 2.63 (H)   BUN/Creatinine ratio Latest Ref Range: 12 - 20   16   Calcium Latest Ref Range: 8.5 - 10.1 MG/DL 9.5   GFR est non-AA Latest Ref Range: >60 ml/min/1.73m2 18 (L)   GFR est AA Latest Ref Range: >60 ml/min/1.73m2 22 (L)   Bilirubin, total Latest Ref Range: 0.2 - 1.0 MG/DL 0.8   Protein, total Latest Ref Range: 6.4 - 8.2 g/dL 6.5   Albumin Latest Ref Range: 3.4 - 5.0 g/dL 3.7   Globulin Latest Ref Range: 2.0 - 4.0 g/dL 2.8   A-G Ratio Latest Ref Range: 0.8 - 1.7   1.3   ALT Latest Ref Range: 13 - 56 U/L 66 (H)   AST Latest Ref Range: 10 - 38 U/L 46 (H)   Alk. phosphatase Latest Ref Range: 45 - 117 U/L 408 (H)   Uric acid Latest Ref Range: 2.6 - 7.2 MG/DL 9.4 (H)   Triglyceride Latest Ref Range: <150 MG/ (H)   Cholesterol, total Latest Ref Range: <200 MG/   HDL Cholesterol Latest Ref Range: 40 - 60 MG/DL 36 (L)   CHOL/HDL Ratio Latest Ref Range: 0 - 5.0   5.4 (H)   VLDL, calculated Latest Units: MG/DL 42.8   LDL, calculated Latest Ref Range: 0 - 100 MG/.2 (H)   Hemoglobin A1c, (calculated) Latest Ref Range: 4.2 - 5.6 % 6.5 (H)   Est. average glucose Latest Units: mg/dL 140   Iron Latest Ref Range: 50 - 175 ug/dL 71   TIBC Latest Ref Range: 250 - 450 ug/dL 311   Iron % saturation Latest Ref Range: 20 - 50 % 23   Ferritin Latest Ref Range: 8 - 388 NG/   Creatinine, urine Latest Ref Range: 30 - 125 mg/dL 130.00 (H)   Microalbumin,urine random Latest Ref Range: 0 - 3.0 MG/DL 3.39 (H)   Microalbumin/Creat. Ratio Latest Ref Range: 0 - 30 mg/g 26   T4, Free Latest Ref Range: 0.7 - 1.5 NG/DL 1.5   TSH Latest Ref Range: 0.36 - 3.74 uIU/mL 7.91 (H)   PTH INTACT Unknown    Vitamin D 25-Hydroxy Latest Ref Range: 30 - 100 ng/mL 32.9        Urinary Tract Infection (UTI) in Women: Care Instructions  Overview     A urinary tract infection, or UTI, is a general term for an infection anywhere between the kidneys and the urethra (where urine comes out). Most UTIs are bladder infections. They often cause pain or burning when you urinate. UTIs are caused by bacteria and can be cured with antibiotics. Be sure to complete your treatment so that the infection does not get worse. Follow-up care is a key part of your treatment and safety.  Be sure to make and go to all appointments, and call your doctor if you are having problems. It's also a good idea to know your test results and keep a list of the medicines you take. How can you care for yourself at home? · Take your antibiotics as directed. Do not stop taking them just because you feel better. You need to take the full course of antibiotics. · Drink extra water and other fluids for the next day or two. This will help make the urine less concentrated and help wash out the bacteria that are causing the infection. (If you have kidney, heart, or liver disease and have to limit fluids, talk with your doctor before you increase the amount of fluids you drink.)  · Avoid drinks that are carbonated or have caffeine. They can irritate the bladder. · Urinate often. Try to empty your bladder each time. · To relieve pain, take a hot bath or lay a heating pad set on low over your lower belly or genital area. Never go to sleep with a heating pad in place. To prevent UTIs  · Drink plenty of water each day. This helps you urinate often, which clears bacteria from your system. (If you have kidney, heart, or liver disease and have to limit fluids, talk with your doctor before you increase the amount of fluids you drink.)  · Urinate when you need to. · If you are sexually active, urinate right after you have sex. · Change sanitary pads often. · Avoid douches, bubble baths, feminine hygiene sprays, and other feminine hygiene products that have deodorants. · After going to the bathroom, wipe from front to back. When should you call for help? Call your doctor now or seek immediate medical care if:    · Symptoms such as fever, chills, nausea, or vomiting get worse or appear for the first time.     · You have new pain in your back just below your rib cage. This is called flank pain.     · There is new blood or pus in your urine.     · You have any problems with your antibiotic medicine.    Watch closely for changes in your health, and be sure to contact your doctor if:    · You are not getting better after taking an antibiotic for 2 days.     · Your symptoms go away but then come back. Where can you learn more? Go to http://www.gray.com/  Enter X155 in the search box to learn more about \"Urinary Tract Infection (UTI) in Women: Care Instructions. \"  Current as of: October 18, 2021               Content Version: 13.2  © 2006-2022 [employer]. Care instructions adapted under license by [employer] (which disclaims liability or warranty for this information). If you have questions about a medical condition or this instruction, always ask your healthcare professional. [place] [address] any warranty or liability for your use of this information. Gout: Care Instructions  Overview     Gout is a form of arthritis caused by a buildup of uric acid crystals in a joint. It causes sudden attacks of pain, swelling, redness, and stiffness, usually in one joint, especially the big toe. Gout usually comes on without a cause. But it can be brought on by drinking alcohol (especially beer), eating or drinking things made with high-fructose corn syrup, or eating seafood or red meat. Taking certain medicines, such as diuretics, can also trigger an attack of gout. Taking your medicines as prescribed and following up with your doctor regularly can help you avoid gout attacks in the future. Follow-up care is a key part of your treatment and safety. Be sure to make and go to all appointments, and call your doctor if you are having problems. It's also a good idea to know your test results and keep a list of the medicines you take. How can you care for yourself at home? · If the joint is swollen, put ice or a cold pack on the area for 10 to 20 minutes at a time. Put a thin cloth between the ice and your skin.   · Prop up the sore limb on a pillow when you ice it or anytime you sit or lie down during the next 3 days. Try to keep it above the level of your heart. This can help reduce swelling. · Rest sore joints. Avoid activities that put weight or strain on the joints for a few days. Take short rest breaks from your regular activities during the day. · Take your medicines exactly as prescribed. Call your doctor if you think you are having a problem with your medicine. · Take pain medicines exactly as directed. ? If the doctor gave you a prescription medicine for pain, take it as prescribed. ? If you are not taking a prescription pain medicine, ask your doctor if you can take an over-the-counter medicine. · Eat less seafood and red meat. · Avoid foods or drinks that are made with high-fructose corn syrup. · Check with your doctor before drinking alcohol. · Losing weight, if you are overweight, may help reduce attacks of gout. But do not go on a diet that causes rapid weight loss. Losing a lot of weight in a short amount of time can cause a gout attack. When should you call for help? Call your doctor now or seek immediate medical care if:    · You have a fever.     · The joint is so painful you cannot use it.     · You have sudden, unexplained swelling, redness, warmth, or severe pain in one or more joints. Watch closely for changes in your health, and be sure to contact your doctor if:    · You have joint pain.     · Your symptoms get worse or are not improving after 2 or 3 days. Where can you learn more? Go to http://www.gray.com/  Enter E531 in the search box to learn more about \"Gout: Care Instructions. \"  Current as of: December 20, 2021               Content Version: 13.2  © 3202-3437 [employer]. Care instructions adapted under license by [employer] (which disclaims liability or warranty for this information).  If you have questions about a medical condition or this instruction, always ask your healthcare professional. Aliene Najjar disclaims any warranty or liability for your use of this information. Purine-Restricted Diet: Care Instructions  Your Care Instructions     Purines are substances that are found in some foods. Your body turns purines into uric acid. High levels of uric acid can cause gout, which is a form of arthritis that causes pain and inflammation in joints. You may be able to help control the amount of uric acid in your body by limiting high-purine foods in your diet. Follow-up care is a key part of your treatment and safety. Be sure to make and go to all appointments, and call your doctor if you are having problems. It's also a good idea to know your test results and keep a list of the medicines you take. How can you care for yourself at home? · Plan your meals and snacks around foods that are low in purines and are safe for you to eat. These foods include:  ? Green vegetables and tomatoes. ? Fruits. ? Whole-grain breads, rice, and cereals. ? Eggs, peanut butter, and nuts. ? Low-fat milk, cheese, and other milk products. ? Popcorn. ? Gelatin desserts, chocolate, cocoa, and cakes and sweets, in small amounts. · You can eat certain foods that are medium-high in purines, but eat them only once in a while. These foods include:  ? Legumes, such as dried beans and dried peas. You can have 1 cup cooked legumes each day. ? Asparagus, cauliflower, spinach, mushrooms, and green peas. ? Fish and seafood (other than very high-purine seafood). ? Oatmeal, wheat bran, and wheat germ. · Limit very high-purine foods, including:  ? Organ meats, such as liver, kidneys, sweetbreads, and brains. ? Meats, including proctor, beef, pork, and lamb. ? Game meats and any other meats in large amounts. ? Anchovies, sardines, herring, mackerel, and scallops. ? Gravy. ? Beer. Where can you learn more?   Go to http://www.gray.com/  Enter F448 in the search box to learn more about \"Purine-Restricted Diet: Care Instructions. \"  Current as of: September 8, 2021               Content Version: 13.2  © 5885-9511 [employer]. Care instructions adapted under license by [employer] (which disclaims liability or warranty for this information). If you have questions about a medical condition or this instruction, always ask your healthcare professional. [place] [address] any warranty or liability for your use of this information.

## 2022-06-08 NOTE — PROGRESS NOTES
Rei Shaw presents today for   Chief Complaint   Patient presents with    Follow-up    Labs     6-1-22    LOW BACK PAIN     Patient previously had UTI on 5-24-22    Fatigue         1. \"Have you been to the ER, urgent care clinic since your last visit? Hospitalized since your last visit? \" no    2. \"Have you seen or consulted any other health care providers outside of the [address] [place] [address] since your last visit? \" yes     3. For patients aged 39-70: Has the patient had a colonoscopy / FIT/ Cologuard? Yes - no Care Gap present      If the patient is female:    4. For patients aged 41-77: Has the patient had a mammogram within the past 2 years? Yes - no Care Gap present  See top three    5. For patients aged 21-65: Has the patient had a pap smear?  No

## 2022-06-08 NOTE — PROGRESS NOTES
[facility]:  6/8/2022, MRN: [id number]      Davida Greco is a 60 y.o. female and presents to clinic for Follow-up, Labs (6-1-22), LOW BACK PAIN (Patient previously had UTI on 5-24-22), and Fatigue      Subjective: The patient is a 21-year-old female with history of right sided breast cancer s/p right mastectomy, colon cancer (2019) s/p surgery, b/l clear cell carcinoma s/p b/l partial nephrectomies, pulmonary nodule, chemotherapy induced CMO/CHF, CKD (followed by ), type 2 DM, CLARENCE, multinodular goiter treated with a thyroidectomy, Covid-19 infection,  pacemaker placement (2021), postoperative hypothyroidism, CLARENCE, HLD, GERD, vitamin D deficiency, and HTN.       1. UTI: S/p rx (ciprofloxacin 250mg bid x 5 days) since her last apt. she still has lower back pain and is concerned that her urinary tract infection is still present. She had frequency sx that resolved with abx. Her UA today shows trace leukocyte esterase. Hematuria: none since her last apt. She developed lower back pain since her last apt. Pain is similar to when she's had UTI related pain in the past. She has midline back pain. She still has some urinary frequency. No dysuria. No urgency. No fever. She has a f/u apt with her Urologist.     2. CKD, H/o Kidney Cancer, and CHF: She was put on losartan and bumex (in place of lasix) per Nephrology. Although she is taking losartan, she is taking her leftover Lasix before starting Bumex in place of Lasix. She is in remission for her kidney cancer. Her most recent labs show: Her creatinine is at her baseline of 2.63. BUN is 42. Her total cholesterol is 196, down from 204 six months ago. Her triglycerides are 214. HDL was 36. LDL is down to 117  from 136. Her calcium is normal at 9.4. Her PTH is elevated at 251. Her vitamin D level is normal at 32. +Taking xarelto, coreg, imdur, lasix (but will start bumex once she runs out of this rx - as mentioned above), and losartan. +Lipitor. She is followed by Dr. Mirian Hassan with Urology. In remission for kidney cancer. She has a h/o recurrent UTIs. She is reporting lowering back pain similar to when she has had UTIs in the past.     1/13/22 Echocardiogram: Mildly enlarged left ventricular cavity size with mild concentric left  ventricular hypertrophy. Left ventricular systolic function is mildly reduced with an ejection  fraction of 45 % by Perez's biplane. Mild global hypokinesis. Normal right ventricular cavity size with visually normal systolic  function. There is mild mitral valve regurgitation. Trace tricuspid and pulmonic regurgitation. Mild pulmonary hypertension, estimated pulmonary arterial systolic pressure is 40 mmHg. Comparison: Compared to prior study from 1/14/2021. Ejection fraction increased from 15% to 45%. Pulmonary artery pressure increased from 17 mmHg to 40 mmHg. 3/4/22 PET Scan: No hypermetabolic abnormality to suggest local tumor recurrence of colon cancer. No hypermetabolic abdominal lymph node. Improved metabolic activity at the hilum and mediastinum, now with mild residual hilar metabolic activity. Finding likely represents resolving prior infectious or inflammatory process. 3.  Type 2 diabetes: Her most recent labs show that her A1c is 6.5. She is diet controlled. 4. Hypothyroidism: Taking Synthroid 112 mcg daily. Her most recent labs show: Her TSH is elevated at 7.91. It was 17.6 in November. Her free T4 is high normal at 1.5.    5. Elevated Alkaline Phosphatase: She has a history of an elevated alkaline phosphatase level. Her level is 408. It has been elevated for years. 6. CLARENCE: CPAP: She is compliant with her CPAP. No issues with her machine at this time. 7. Gout: She had taken a course of prednisone earlier this yr for this condition. Her uric acid per her recent labs was in the 9 range. +Allopurinol.            Patient Active Problem List    Diagnosis Date Noted    Anemia in chronic kidney disease 06/27/2020    Breast cancer, right ([facility] 06/27/2020    Gout 06/27/2020    Secondary hyperparathyroidism of renal origin ([facility] 06/27/2020    Hypercholesteremia 11/23/2019    GERD (gastroesophageal reflux disease) 11/23/2019    Vitamin D deficiency 11/23/2019    Acquired hypothyroidism 11/23/2019    HTN (hypertension) 11/23/2019    Pulmonary nodule 11/23/2019    Malignant tumor of descending colon ([facility] 07/22/2019    Chronic systolic congestive heart failure ([facility] 11/15/2017    Chronic kidney disease (CKD) stage G4/A1, severely decreased glomerular filtration rate (GFR) between 15-29 mL/min/1.73 square meter and albuminuria creatinine ratio less than 30 mg/g (HCC) 04/13/2017    Malignant neoplasm of left kidney ([facility] 04/04/2016    Clear cell carcinoma of kidney ([facility] 03/22/2016    Toxic multinodular goiter 12/04/2015    CLARENCE (obstructive sleep apnea) 11/09/2015    Chemotherapy induced cardiomyopathy ([facility] 08/11/2015    Type 2 diabetes mellitus with stage 4 chronic kidney disease, without long-term current use of insulin ([facility] 08/03/2015       Current Outpatient Medications   Medication Sig Dispense Refill    furosemide (LASIX) 40 mg tablet Take  by mouth daily.  losartan (COZAAR) 25 mg tablet Take  by mouth daily.  Xarelto 20 mg tab tablet       levothyroxine (SYNTHROID) 112 mcg tablet TAKE 1 TABLET BY MOUTH EVERY DAY BEFORE BREAKFAST 90 Tablet 1    atorvastatin (LIPITOR) 40 mg tablet TAKE 1 TABLET BY MOUTH EVERY DAY 90 Tablet 1    bumetanide (BUMEX) 1 mg tablet Take 1 mg by mouth daily.  calcitRIOL (ROCALTROL) 0.25 mcg capsule TAKE 1 CAPSULE BY MOUTH EVERY DAY 90 Capsule 3    lansoprazole (PREVACID) 30 mg capsule TAKE 1 CAPSULE BY MOUTH EVERY DAY 90 Capsule 3    isosorbide mononitrate ER (IMDUR) 30 mg tablet Take 30 mg by mouth daily.       DULoxetine (CYMBALTA) 60 mg capsule       allopurinoL (ZYLOPRIM) 100 mg tablet TAKE 1 TABLET BY MOUTH EVERY DAY      COREG 25 mg tablet Take 25 mg by mouth two (2) times a day.  0    ondansetron (ZOFRAN ODT) 8 mg disintegrating tablet Take 1 Tab by mouth every eight (8) hours as needed for Nausea or Vomiting. (Patient not taking: Reported on 6/8/2022) 20 Tab 0       Allergies   Allergen Reactions    Codeine Swelling, Other (comments) and Not Reported This Time     Throat Swelling    Penicillins Other (comments) and Angioedema     N/V, swelling    Tramadol Swelling    Sulfa (Sulfonamide Antibiotics) Other (comments)     itching    Sulfur Hives       Past Medical History:   Diagnosis Date    [facility]-fib [facility]     Cancer ([facility] 2000    Right breast ca     Colon cancer ([facility]     Congestive heart failure, unspecified     Gout     H/O total mastectomy of right breast     Heart disease     Hx: UTI (urinary tract infection)     Hypercholesterolemia     Iron deficiency anemia     Nausea & vomiting     Renal cell carcinoma of left kidney ([facility] 12/17/15    Renal mass, left     Thyroid cancer ([facility]     2017       Past Surgical History:   Procedure Laterality Date    HX CHOLECYSTECTOMY      HX MASTECTOMY Right     HX NEPHRECTOMY Right 3/22/16    Partial x2, Dr. Delos Goldberg, [facility]    HX ORTHOPAEDIC  04/19/2017    Trigger finger release R hand    HX RENAL BIOPSY Right 12/17/15    Ct guided bx, Dr. Florinda Haines, [facility]    HX THYROIDECTOMY  6/20/2018    IR INSERT TUNL CVC W PORT OVER 5 YEARS  10/15/2019    IR REMOVE TUNL CVAD W PORT/PUMP  3/5/2021       Family History   Problem Relation Age of Onset    Hypertension Father     Heart Attack Father     Heart Failure Father     Diabetes Father        Social History     Tobacco Use    Smoking status: Never Smoker    Smokeless tobacco: Never Used   Substance Use Topics    Alcohol use: No       ROS   Review of Systems   Constitutional: Negative for chills and fever. HENT: Negative for ear pain and sore throat. Eyes: Negative for blurred vision and pain.    Respiratory: Negative for cough and shortness of breath. Cardiovascular: Negative for chest pain. Gastrointestinal: Negative for abdominal pain, blood in stool and melena. Genitourinary: Negative for dysuria and hematuria. Musculoskeletal: Positive for back pain. Negative for joint pain and myalgias. Skin: Negative for rash. Neurological: Negative for headaches. Endo/Heme/Allergies: Does not bruise/bleed easily. Psychiatric/Behavioral: Negative for substance abuse. Objective     Vitals:    06/08/22 1120   BP: 103/88   Pulse: 69   Resp: 18   Temp: 97.8 °F (36.6 °C)   TempSrc: Temporal   SpO2: 97%   Weight: 192 lb (87.1 kg)   Height: 5' 6\" (1.676 m)   PainSc:   4   PainLoc: Back       Physical Exam  Vitals and nursing note reviewed. HENT:      Head: Normocephalic and atraumatic. Right Ear: External ear normal.      Left Ear: External ear normal.   Eyes:      General: No scleral icterus. Right eye: No discharge. Left eye: No discharge. Conjunctiva/sclera: Conjunctivae normal.   Cardiovascular:      Rate and Rhythm: Normal rate and regular rhythm. Heart sounds: Normal heart sounds. No murmur heard. No friction rub. No gallop. Pulmonary:      Effort: Pulmonary effort is normal. No respiratory distress. Breath sounds: Normal breath sounds. No wheezing or rales. Abdominal:      General: Bowel sounds are normal. There is no distension. Palpations: Abdomen is soft. There is no mass. Tenderness: There is abdominal tenderness (suprapubic area is mildly tender to palpation). There is no guarding or rebound. Musculoskeletal:         General: No swelling (BUE) or tenderness (BUE). Cervical back: Neck supple. Lymphadenopathy:      Cervical: No cervical adenopathy. Skin:     General: Skin is warm and dry. Findings: No erythema or rash. Neurological:      Mental Status: She is alert. Motor: No abnormal muscle tone.       Gait: Gait normal.   Psychiatric: Mood and Affect: Mood normal.         LABS   Data Review:   Lab Results   Component Value Date/Time    WBC 7.7 06/01/2022 10:45 AM    HGB 13.5 06/01/2022 10:45 AM    HCT 41.8 06/01/2022 10:45 AM    PLATELET 157 25/11/6222 10:45 AM    MCV 97.9 06/01/2022 10:45 AM       Lab Results   Component Value Date/Time    Sodium 142 06/01/2022 10:45 AM    Potassium 3.8 06/01/2022 10:45 AM    Chloride 108 06/01/2022 10:45 AM    CO2 25 06/01/2022 10:45 AM    Anion gap 9 06/01/2022 10:45 AM    Glucose 127 (H) 06/01/2022 10:45 AM    BUN 42 (H) 06/01/2022 10:45 AM    Creatinine 2.63 (H) 06/01/2022 10:45 AM    BUN/Creatinine ratio 16 06/01/2022 10:45 AM    GFR est AA 22 (L) 06/01/2022 10:45 AM    GFR est non-AA 18 (L) 06/01/2022 10:45 AM    Calcium 9.5 06/01/2022 10:45 AM    Calcium 9.4 06/01/2022 10:45 AM       Lab Results   Component Value Date/Time    Cholesterol, total 196 06/01/2022 10:45 AM    HDL Cholesterol 36 (L) 06/01/2022 10:45 AM    LDL, calculated 117.2 (H) 06/01/2022 10:45 AM    VLDL, calculated 42.8 06/01/2022 10:45 AM    Triglyceride 214 (H) 06/01/2022 10:45 AM    CHOL/HDL Ratio 5.4 (H) 06/01/2022 10:45 AM       Lab Results   Component Value Date/Time    Hemoglobin A1c 6.5 (H) 06/01/2022 10:45 AM    Hemoglobin A1c (POC) 6.2 07/31/2015 12:27 PM       Assessment/Plan:   1. Low back pain: Given her UA findings and sx/PE findings, I will rx for presumed UTI.   - UA results today are c/w a UTI  - Urine cx ordered  - Ciprofloxacin course prescribed. ORDERS:  - AMB POC URINALYSIS DIP STICK AUTO W/ MICRO  - CULTURE, URINE; Future  - ciprofloxacin HCl (CIPRO) 250 mg tablet; Take 1 Tablet by mouth two (2) times a day for 7 days. Dispense: 14 Tablet; Refill: 0    2. Acquired hypothyroidism Her TSH is still elevated. - Increasing her synthroid from 112mcg daily to 125mcg daily. - We will recheck her TFTs    ORDERS:  - levothyroxine (SYNTHROID) 125 mcg tablet; Take 1 Tablet by mouth Daily (before breakfast).   Dispense: 30 Tablet; Refill: 5  - TSH AND FREE T4; Future    3. Elevated alkaline phosphatase level: Etiology is unknown. +H/o hematuria and recurrent UTIs. - Checking isoenzymes  - Checking a DEXA scan  - Checking an abdominal ultrasound    ORDERS:  - ALK PHOS ISOENZYMES; Future  - DEXA BONE DENSITY STUDY AXIAL; Future  - US ABD COMP; Future    4. Kidney Cancer Hx: In remission.  - I encouraged her to f/u with Urology for continued cancer surveillance studies. 5. CHF: Stable. - C/w rx as prescribed. 6. Gout: Her uric acid level is very high. She admits that she's been eating foods to trigger her gout. - C/w allopurinol, dosed per her renal function. - I encouraged her to avoid foods that trigger gout flare ups    7. Type 2 DM: Stable. - C/w dietary control measures    8. CLARENCE: Stable. - C/w CPAP         Health Maintenance Due   Topic Date Due    COVID-19 Vaccine (1) Never done    Cervical cancer screen  Never done    Shingrix Vaccine Age 50> (1 of 2) Never done    Eye Exam Retinal or Dilated  12/19/2018    Foot Exam Q1  06/06/2020    Breast Cancer Screen Mammogram  10/30/2021     Lab review: labs are reviewed in the EHR and ordered as mentioned above    I have discussed the diagnosis with the patient and the intended plan as seen in the above orders. The patient has received an after-visit summary and questions were answered concerning future plans. I have discussed medication side effects and warnings with the patient as well. I have reviewed the plan of care with the patient, accepted their input and they are in agreement with the treatment goals. All questions were answered. The patient understands the plan of care. Handouts provided today with above information. Pt instructed if symptoms worsen to call the office or report to the ED for continued care. Greater than 50% of the visit time was spent in counseling and/or coordination of care.      Voice recognition was used to generate this report, which may have resulted in some phonetic based errors in grammar and contents. Even though attempts were made to correct all the mistakes, some may have been missed, and remained in the body of the document.           Froy Whitten MD

## 2022-06-09 LAB
BACTERIA SPEC CULT: NORMAL
SERVICE CMNT-IMP: NORMAL

## 2022-06-10 LAB
ALP BONE CFR SERPL: 24 % (ref 14–68)
ALP INTEST CFR SERPL: 3 % (ref 0–18)
ALP LIVER CFR SERPL: 73 % (ref 18–85)
ALP SERPL-CCNC: 438 IU/L (ref 44–121)

## 2022-06-21 ENCOUNTER — HOSPITAL ENCOUNTER (OUTPATIENT)
Dept: MAMMOGRAPHY | Age: 62
Discharge: HOME | End: 2022-06-21
Attending: PHYSICIAN ASSISTANT
Payer: COMMERCIAL

## 2022-06-21 ENCOUNTER — HOSPITAL ENCOUNTER (OUTPATIENT)
Dept: ULTRASOUND IMAGING | Age: 62
Discharge: HOME | End: 2022-06-21
Attending: INTERNAL MEDICINE
Payer: COMMERCIAL

## 2022-06-21 ENCOUNTER — HOSPITAL ENCOUNTER (OUTPATIENT)
Dept: BONE DENSITY | Age: 62
Discharge: HOME | End: 2022-06-21
Attending: INTERNAL MEDICINE
Payer: COMMERCIAL

## 2022-06-21 DIAGNOSIS — Z12.31 VISIT FOR SCREENING MAMMOGRAM: ICD-10-CM

## 2022-06-21 PROCEDURE — 77080 DXA BONE DENSITY AXIAL: CPT

## 2022-06-21 PROCEDURE — 77063 BREAST TOMOSYNTHESIS BI: CPT

## 2022-06-21 PROCEDURE — 76700 US EXAM ABDOM COMPLETE: CPT

## 2022-06-27 NOTE — PROGRESS NOTES
Her abdominal ultrasound shows no findings consistent with cancer recurrence. Please let her know. The ultrasound shows findings consistent with underlying mild liver disease. From chemotherapy in the past? NAFLD/fatty liver disease? I am not sure but please have her schedule to see GI for evaluation of her elevated alkaline phosphatase level.     Dr. Catherine Briceño  [facility], [address] [place] [address], [address] [place] [address] Str.  Phone: [phone]  Fax: [phone]

## 2022-07-12 DIAGNOSIS — R79.89 ELEVATED TSH: Primary | ICD-10-CM

## 2022-07-12 DIAGNOSIS — E03.9 ACQUIRED HYPOTHYROIDISM: ICD-10-CM

## 2022-07-12 DIAGNOSIS — R79.89 ELEVATED TSH: ICD-10-CM

## 2022-07-12 DIAGNOSIS — R74.8 ELEVATED ALKALINE PHOSPHATASE LEVEL: ICD-10-CM

## 2022-07-12 RX ORDER — LEVOTHYROXINE SODIUM 112 UG/1
TABLET ORAL
Qty: 90 TABLET | Refills: 3 | Status: SHIPPED | OUTPATIENT
Start: 2022-07-12

## 2022-07-12 RX ORDER — LEVOTHYROXINE SODIUM 125 UG/1
TABLET ORAL
Qty: 90 TABLET | Refills: 3 | Status: SHIPPED | OUTPATIENT
Start: 2022-07-12

## 2022-07-12 NOTE — PROGRESS NOTES
I discussed her results with her. I will have her take alternating doses of Synthroid 125 mcg and 112 mcg daily. We will recheck her TFTs. Note that the 125 mcg dose was too high in the 112 mcg dose was too low. Recent results show that her TSH and free T4 were elevated. At result, I am also ordering an MRI of her brain to rule out a pituitary adenoma as a cause of her TFT results. I am also placing a referral to see her GI team for a checkup given her elevated alkaline phosphatase.       Dr. Bia Alfonso  [facility], [address] [place] [address], [address] [place] [address] Str.  Phone: [phone]  Fax: [phone]

## 2022-07-21 ENCOUNTER — APPOINTMENT (OUTPATIENT)
Dept: INTERNAL MEDICINE CLINIC | Age: 62
End: 2022-07-21

## 2022-07-22 LAB
POTASSIUM SERPL-SCNC: 4.4 MMOL/L (ref 3.5–5.2)
T4 FREE SERPL-MCNC: 1.69 NG/DL (ref 0.82–1.77)
TSH SERPL DL<=0.005 MIU/L-ACNC: 2.65 UIU/ML (ref 0.45–4.5)

## 2022-07-28 ENCOUNTER — OFFICE VISIT (OUTPATIENT)
Dept: INTERNAL MEDICINE CLINIC | Age: 62
End: 2022-07-28
Payer: COMMERCIAL

## 2022-07-28 VITALS
TEMPERATURE: 98.6 F | WEIGHT: 193 LBS | BODY MASS INDEX: 31.02 KG/M2 | SYSTOLIC BLOOD PRESSURE: 125 MMHG | DIASTOLIC BLOOD PRESSURE: 75 MMHG | HEIGHT: 66 IN | RESPIRATION RATE: 14 BRPM | HEART RATE: 87 BPM | OXYGEN SATURATION: 96 %

## 2022-07-28 DIAGNOSIS — R51.9 NONINTRACTABLE HEADACHE, UNSPECIFIED CHRONICITY PATTERN, UNSPECIFIED HEADACHE TYPE: ICD-10-CM

## 2022-07-28 DIAGNOSIS — E03.9 ACQUIRED HYPOTHYROIDISM: ICD-10-CM

## 2022-07-28 DIAGNOSIS — R19.00 ABDOMINAL MASS, UNSPECIFIED ABDOMINAL LOCATION: ICD-10-CM

## 2022-07-28 DIAGNOSIS — N18.4 TYPE 2 DIABETES MELLITUS WITH STAGE 4 CHRONIC KIDNEY DISEASE, WITHOUT LONG-TERM CURRENT USE OF INSULIN (HCC): ICD-10-CM

## 2022-07-28 DIAGNOSIS — R06.83 SNORING: ICD-10-CM

## 2022-07-28 DIAGNOSIS — R19.00 ABDOMINAL MASS, UNSPECIFIED ABDOMINAL LOCATION: Primary | ICD-10-CM

## 2022-07-28 DIAGNOSIS — R74.8 ELEVATED ALKALINE PHOSPHATASE LEVEL: ICD-10-CM

## 2022-07-28 DIAGNOSIS — E11.22 TYPE 2 DIABETES MELLITUS WITH STAGE 4 CHRONIC KIDNEY DISEASE, WITHOUT LONG-TERM CURRENT USE OF INSULIN (HCC): ICD-10-CM

## 2022-07-28 PROCEDURE — 99214 OFFICE O/P EST MOD 30 MIN: CPT | Performed by: INTERNAL MEDICINE

## 2022-07-28 PROCEDURE — 3044F HG A1C LEVEL LT 7.0%: CPT | Performed by: INTERNAL MEDICINE

## 2022-07-28 NOTE — PROGRESS NOTES
INTERNISTS OF [facility]:  7/28/2022, MRN: [id number]      Licha Varner is a 60 y.o. female and presents to clinic for Follow-up and Labs (7-21-22)      Subjective: The patient is a 58-year-old female with history of right sided breast cancer s/p right mastectomy, colon cancer (2019) s/p surgery, b/l clear cell carcinoma s/p b/l partial nephrectomies, pulmonary nodule, chemotherapy induced CMO/CHF, CKD (followed by Sherie Viramontes), type 2 DM, suspected CLARENCE, multinodular goiter treated with a thyroidectomy, Covid-19 infection,  pacemaker placement (2021), postoperative hypothyroidism, HLD, GERD, vitamin D deficiency, and HTN. 1.  Hypothyroidism: Her Synthroid dose was increased from 112 mcg daily to 125 mcg daily at her last visit due to her recent TFTs. Follow-up TFTs were normal. A pitutary MRI was ordered but since her labs look good, we will hold off on this study. 2. Elevated Alkaline Phosphatase: Present for over a year. Etiology is unknown. Her level has been as high as the 400s range. +H/o transaminitis. She is scheduled for a check up with GI.     6/21/22 RUQ Ultrasound: Cholecystectomy without biliary duct dilatation. Left nephrectomy with bowel in the postsurgical bed. Evaluation of the abdominal aorta likely limited by bowel gas. Distal abdominal aorta tapering not evident. May benefit from continued ultrasound surveillance for aneurysm development. Mildly echogenic liver. Correlate for underlying liver disease. 6/21/22 DEXA:  BMD measures consistent with osteopenia/low bone density. 3. Fatigue: She has increased fatigue. +CKD. No CP or palpitations. No SOB. She is scheduled with Cardiology in September. +Suspected CLARENCE is untreated. +Snoring. +HAs - occur with her fatigue. 1/13/22 Echocardiogram: Mildly enlarged left ventricular cavity size with mild concentric left  ventricular hypertrophy.   Left ventricular systolic function is mildly reduced with an ejection  fraction of 45 % by Perez's biplane. Mild global hypokinesis. Normal right ventricular cavity size with visually normal systolic  function. There is mild mitral valve regurgitation. Trace tricuspid and pulmonic regurgitation. Mild pulmonary hypertension, estimated pulmonary arterial systolic pressure is 40 mmHg. Comparison: Compared to prior study from 1/14/2021. Ejection fraction increased from 15% to 45%. Pulmonary artery pressure increased from 17 mmHg to 40 mmHg. 4. Suspected CLARENCE: Untreated. +Snoring. She has never had a sleep study per her hx.     5. Abdominal Mass: She has an area along her abdomen that she felt in between apts. It's above her umbilicus. It's getting larger. 6. Type 2 DM: Present for yrs. Diet controlled. Her last A1C was 6.5.     7. General:  - She wants a handicap parking pass   - She has b/l breast implants.        Patient Active Problem List    Diagnosis Date Noted    Anemia in chronic kidney disease 06/27/2020    Breast cancer, right ([facility] 06/27/2020    Gout 06/27/2020    Secondary hyperparathyroidism of renal origin ([address] 06/27/2020    Hypercholesteremia 11/23/2019    GERD (gastroesophageal reflux disease) 11/23/2019    Vitamin D deficiency 11/23/2019    Acquired hypothyroidism 11/23/2019    HTN (hypertension) 11/23/2019    Pulmonary nodule 11/23/2019    Malignant tumor of descending colon ([address] 07/22/2019    Chronic systolic congestive heart failure ([address] 11/15/2017    Chronic kidney disease (CKD) stage G4/A1, severely decreased glomerular filtration rate (GFR) between 15-29 mL/min/1.73 square meter and albuminuria creatinine ratio less than 30 mg/g ([address] 04/13/2017    Malignant neoplasm of left kidney ([address] 04/04/2016    Clear cell carcinoma of kidney ([address] 03/22/2016    Toxic multinodular goiter 12/04/2015    CLARENCE (obstructive sleep apnea) 11/09/2015    Chemotherapy induced cardiomyopathy ([address] 08/11/2015    Type 2 diabetes mellitus with stage 4 chronic kidney disease, without long-term current use of insulin ([facility] 08/03/2015       Current Outpatient Medications   Medication Sig Dispense Refill    levothyroxine (SYNTHROID) 125 mcg tablet Take 125mcg po q am before breakfast on Tuesdays, Thursdays, and Saturdays 90 Tablet 3    levothyroxine (SYNTHROID) 112 mcg tablet Take 112mcg po q am before breakfast on Mondays, Wednesdays, Fridays, and Sundays. 90 Tablet 3    losartan (COZAAR) 25 mg tablet Take  by mouth daily. calcitRIOL (ROCALTROL) 0.25 mcg capsule TAKE 1 CAPSULE BY MOUTH EVERY DAY 90 Capsule 3    lansoprazole (PREVACID) 30 mg capsule TAKE 1 CAPSULE BY MOUTH EVERY DAY 90 Capsule 3    Xarelto 20 mg tab tablet       atorvastatin (LIPITOR) 40 mg tablet TAKE 1 TABLET BY MOUTH EVERY DAY 90 Tablet 1    bumetanide (BUMEX) 1 mg tablet Take 1 mg by mouth daily. isosorbide mononitrate ER (IMDUR) 30 mg tablet Take 30 mg by mouth daily. DULoxetine (CYMBALTA) 60 mg capsule       allopurinoL (ZYLOPRIM) 100 mg tablet TAKE 1 TABLET BY MOUTH EVERY DAY      COREG 25 mg tablet Take 25 mg by mouth two (2) times a day. 0    furosemide (LASIX) 40 mg tablet Take  by mouth daily. (Patient not taking: Reported on 7/28/2022)      ondansetron (ZOFRAN ODT) 8 mg disintegrating tablet Take 1 Tab by mouth every eight (8) hours as needed for Nausea or Vomiting.  (Patient not taking: No sig reported) 20 Tab 0       Allergies   Allergen Reactions    Codeine Swelling, Other (comments) and Not Reported This Time     Throat Swelling    Penicillins Other (comments) and Angioedema     N/V, swelling    Tramadol Swelling    Sulfa (Sulfonamide Antibiotics) Other (comments)     itching    Sulfur Hives       Past Medical History:   Diagnosis Date    A-fib ([facility]     Cancer ([facility] 2000    Right breast ca     Colon cancer ([facility]     Congestive heart failure, unspecified     Gout     H/O total mastectomy of right breast     Heart disease     Hx: UTI (urinary tract infection)     Hypercholesterolemia     Iron deficiency anemia Nausea & vomiting     Renal cell carcinoma of left kidney (HCC) 12/17/15    Renal mass, left     Thyroid cancer ([facility]     2017       Past Surgical History:   Procedure Laterality Date    HX CHOLECYSTECTOMY      HX MASTECTOMY Right     HX NEPHRECTOMY Right 3/22/16    Partial x2, Dr. Margarita Lopez, [facility]    HX ORTHOPAEDIC  04/19/2017    Trigger finger release R hand    HX RENAL BIOPSY Right 12/17/15    Ct guided bx, Dr. Yuridia Baker, [facility]    HX THYROIDECTOMY  6/20/2018    IR INSERT TUNL CVC W PORT OVER 5 YEARS  10/15/2019    IR [de-identified] TUNL CVAD W PORT/PUMP  3/5/2021       Family History   Problem Relation Age of Onset    Hypertension Father     Heart Attack Father     Heart Failure Father     Diabetes Father        Social History     Tobacco Use    Smoking status: Never    Smokeless tobacco: Never   Substance Use Topics    Alcohol use: No       ROS   Review of Systems   Constitutional:  Positive for malaise/fatigue. Negative for chills and fever. HENT:  Negative for ear pain and sore throat. Eyes:  Negative for blurred vision and pain. Respiratory:  Negative for cough and shortness of breath. Cardiovascular:  Negative for chest pain. Gastrointestinal:  Negative for abdominal pain, blood in stool and melena. Genitourinary:  Negative for dysuria and hematuria. Musculoskeletal:  Negative for joint pain and myalgias. Skin:         +Abdominal mass. No rash   Neurological:  Negative for headaches. Endo/Heme/Allergies:  Does not bruise/bleed easily. Psychiatric/Behavioral:  Negative for substance abuse. Objective     Vitals:    07/28/22 1122   BP: 125/75   Pulse: 87   Resp: 14   Temp: 98.6 °F (37 °C)   TempSrc: Temporal   SpO2: 96%   Weight: 193 lb (87.5 kg)   Height: 5' 6\" (1.676 m)   PainSc:   0 - No pain       Physical Exam  Vitals and nursing note reviewed. HENT:      Head: Normocephalic and atraumatic.       Right Ear: External ear normal.      Left Ear: External ear normal.   Eyes:      General: No scleral icterus. Right eye: No discharge. Left eye: No discharge. Conjunctiva/sclera: Conjunctivae normal.   Cardiovascular:      Rate and Rhythm: Normal rate and regular rhythm. Heart sounds: Normal heart sounds. No murmur heard. No friction rub. No gallop. Pulmonary:      Effort: Pulmonary effort is normal. No respiratory distress. Breath sounds: Normal breath sounds. No wheezing or rales. Abdominal:      General: Bowel sounds are normal. There is no distension. Palpations: Abdomen is soft. There is no mass. Tenderness: There is no abdominal tenderness. There is no guarding or rebound. Musculoskeletal:         General: No swelling (BUE) or tenderness (BUE). Cervical back: Neck supple. Lymphadenopathy:      Cervical: No cervical adenopathy. Skin:     General: Skin is warm and dry. Findings: No erythema or rash. Comments: Abdominal wall soft tissue nodule present, NTTP   Neurological:      Mental Status: She is alert. Motor: No abnormal muscle tone.       Gait: Gait normal.   Psychiatric:         Mood and Affect: Mood normal.       LABS   Data Review:   Lab Results   Component Value Date/Time    WBC 7.7 06/01/2022 10:45 AM    HGB 13.5 06/01/2022 10:45 AM    HCT 41.8 06/01/2022 10:45 AM    PLATELET 751 54/24/4352 10:45 AM    MCV 97.9 06/01/2022 10:45 AM       Lab Results   Component Value Date/Time    Sodium 142 06/01/2022 10:45 AM    Potassium 4.4 07/21/2022 03:33 AM    Chloride 108 06/01/2022 10:45 AM    CO2 25 06/01/2022 10:45 AM    Anion gap 9 06/01/2022 10:45 AM    Glucose 127 (H) 06/01/2022 10:45 AM    BUN 42 (H) 06/01/2022 10:45 AM    Creatinine 2.63 (H) 06/01/2022 10:45 AM    BUN/Creatinine ratio 16 06/01/2022 10:45 AM    GFR est AA 22 (L) 06/01/2022 10:45 AM    GFR est non-AA 18 (L) 06/01/2022 10:45 AM    Calcium 9.5 06/01/2022 10:45 AM    Calcium 9.4 06/01/2022 10:45 AM       Lab Results   Component Value Date/Time    Cholesterol, total 196 06/01/2022 10:45 AM    HDL Cholesterol 36 (L) 06/01/2022 10:45 AM    LDL, calculated 117.2 (H) 06/01/2022 10:45 AM    VLDL, calculated 42.8 06/01/2022 10:45 AM    Triglyceride 214 (H) 06/01/2022 10:45 AM    CHOL/HDL Ratio 5.4 (H) 06/01/2022 10:45 AM       Lab Results   Component Value Date/Time    Hemoglobin A1c 6.5 (H) 06/01/2022 10:45 AM    Hemoglobin A1c (POC) 6.2 07/31/2015 12:27 PM       Assessment/Plan:   1. Abdominal Mass:   - CT of the abdomen ordered. ORDERS:  - CT ABD WO CONT; Future    2. Nonintractable headache: From CLARENCE? +Snoring. +Fatigue.   - Referral to Sleep Medicine. ORDERS:  - SLEEP MEDICINE REFERRAL    3. Hypothyroidism: TFTs are WNL  - C/w rx as prescribed. 4. Elevated Alkaline Phosphatase:  From her liver? - I encouraged her to follow up with her GI team for a check up. 5. General:  - Handicap forms completed. 6. Type 2 DM: Her A1C is 6.5  - Low carb diet recommended. Health Maintenance Due   Topic Date Due    COVID-19 Vaccine (1) Never done    Shingrix Vaccine Age 50> (1 of 2) Never done    Cervical cancer screen  Never done    Eye Exam Retinal or Dilated  12/19/2018    Foot Exam Q1  06/06/2020     Lab review: labs are reviewed in the EHR and ordered as mentioned above    I have discussed the diagnosis with the patient and the intended plan as seen in the above orders. The patient has received an after-visit summary and questions were answered concerning future plans. I have discussed medication side effects and warnings with the patient as well. I have reviewed the plan of care with the patient, accepted their input and they are in agreement with the treatment goals. All questions were answered. The patient understands the plan of care. Handouts provided today with above information. Pt instructed if symptoms worsen to call the office or report to the ED for continued care.   Greater than 50% of the visit time was spent in counseling and/or coordination of care.      Voice recognition was used to generate this report, which may have resulted in some phonetic based errors in grammar and contents. Even though attempts were made to correct all the mistakes, some may have been missed, and remained in the body of the document.           Penelope Allen MD

## 2022-07-28 NOTE — PROGRESS NOTES
Hamilton Villarreal presents today for   Chief Complaint   Patient presents with    Follow-up    Labs     7-21-22         1. \"Have you been to the ER, urgent care clinic since your last visit? Hospitalized since your last visit? \" no    2. \"Have you seen or consulted any other health care providers outside of the [address] [place] [address] since your last visit? \" yes     3. For patients aged 39-70: Has the patient had a colonoscopy / FIT/ Cologuard? Yes - no Care Gap present      If the patient is female:    4. For patients aged 41-77: Has the patient had a mammogram within the past 2 years? Yes - no Care Gap present  See top three    5. For patients aged 21-65: Has the patient had a pap smear?  No

## 2022-09-01 ENCOUNTER — TELEPHONE (OUTPATIENT)
Dept: INTERNAL MEDICINE CLINIC | Age: 62
End: 2022-09-01

## 2022-09-01 NOTE — TELEPHONE ENCOUNTER
Meg Miller with [facility] called and stated they need a most recent copy of  Dona james.     UT#[phone]    Please and Thank you

## 2022-12-17 DIAGNOSIS — E03.9 ACQUIRED HYPOTHYROIDISM: ICD-10-CM

## 2022-12-17 DIAGNOSIS — R79.89 ELEVATED TSH: ICD-10-CM

## 2022-12-17 DIAGNOSIS — E78.00 HYPERCHOLESTEREMIA: ICD-10-CM

## 2022-12-19 ENCOUNTER — TELEPHONE (OUTPATIENT)
Dept: INTERNAL MEDICINE CLINIC | Age: 62
End: 2022-12-19

## 2022-12-19 NOTE — TELEPHONE ENCOUNTER
Pt calling asking to see Dr. Vinita Brown for lower back pain for about 3 weeks. Says she isn't sure if it is from sitting at her job or what. Says it just isn't going away. She only has 1 kidney so pain in that area worries her. She doesn't think it is a kidney infection.

## 2022-12-19 NOTE — TELEPHONE ENCOUNTER
Patient reached and advised to go to urgent care for evaluation due to schedule. Patient verbalizes understanding.

## 2022-12-20 RX ORDER — LEVOTHYROXINE SODIUM 125 UG/1
TABLET ORAL
Qty: 90 TABLET | Refills: 0 | Status: SHIPPED | OUTPATIENT
Start: 2022-12-20

## 2022-12-20 RX ORDER — LEVOTHYROXINE SODIUM 112 UG/1
TABLET ORAL
Qty: 90 TABLET | Refills: 0 | Status: SHIPPED | OUTPATIENT
Start: 2022-12-20

## 2022-12-20 RX ORDER — ATORVASTATIN CALCIUM 40 MG/1
TABLET, FILM COATED ORAL
Qty: 90 TABLET | Refills: 0 | Status: SHIPPED | OUTPATIENT
Start: 2022-12-20

## 2023-01-06 ENCOUNTER — OFFICE VISIT (OUTPATIENT)
Dept: INTERNAL MEDICINE CLINIC | Age: 63
End: 2023-01-06
Payer: COMMERCIAL

## 2023-01-06 ENCOUNTER — HOSPITAL ENCOUNTER (OUTPATIENT)
Dept: GENERAL RADIOLOGY | Age: 63
End: 2023-01-06
Payer: COMMERCIAL

## 2023-01-06 ENCOUNTER — HOSPITAL ENCOUNTER (OUTPATIENT)
Dept: LAB | Age: 63
End: 2023-01-06
Payer: COMMERCIAL

## 2023-01-06 ENCOUNTER — APPOINTMENT (OUTPATIENT)
Dept: INTERNAL MEDICINE CLINIC | Age: 63
End: 2023-01-06

## 2023-01-06 VITALS
BODY MASS INDEX: 31.5 KG/M2 | OXYGEN SATURATION: 96 % | DIASTOLIC BLOOD PRESSURE: 75 MMHG | SYSTOLIC BLOOD PRESSURE: 125 MMHG | WEIGHT: 196 LBS | HEIGHT: 66 IN | TEMPERATURE: 97.6 F | RESPIRATION RATE: 14 BRPM | HEART RATE: 80 BPM

## 2023-01-06 DIAGNOSIS — N25.81 SECONDARY HYPERPARATHYROIDISM OF RENAL ORIGIN (HCC): ICD-10-CM

## 2023-01-06 DIAGNOSIS — C64.9 RENAL CELL CARCINOMA, UNSPECIFIED LATERALITY (HCC): ICD-10-CM

## 2023-01-06 DIAGNOSIS — M25.50 ARTHRALGIA, UNSPECIFIED JOINT: ICD-10-CM

## 2023-01-06 DIAGNOSIS — N18.4 TYPE 2 DIABETES MELLITUS WITH STAGE 4 CHRONIC KIDNEY DISEASE, WITHOUT LONG-TERM CURRENT USE OF INSULIN (HCC): ICD-10-CM

## 2023-01-06 DIAGNOSIS — S39.012A STRAIN OF LUMBAR REGION, INITIAL ENCOUNTER: ICD-10-CM

## 2023-01-06 DIAGNOSIS — I50.22 CHRONIC SYSTOLIC CONGESTIVE HEART FAILURE (HCC): ICD-10-CM

## 2023-01-06 DIAGNOSIS — M25.50 ARTHRALGIA, UNSPECIFIED JOINT: Primary | ICD-10-CM

## 2023-01-06 DIAGNOSIS — M54.31 SCIATICA OF RIGHT SIDE: ICD-10-CM

## 2023-01-06 DIAGNOSIS — E11.22 TYPE 2 DIABETES MELLITUS WITH STAGE 4 CHRONIC KIDNEY DISEASE, WITHOUT LONG-TERM CURRENT USE OF INSULIN (HCC): ICD-10-CM

## 2023-01-06 LAB
ALBUMIN SERPL-MCNC: 3.6 G/DL (ref 3.4–5)
ALBUMIN/GLOB SERPL: 1.2 (ref 0.8–1.7)
ALP SERPL-CCNC: 370 U/L (ref 45–117)
ALT SERPL-CCNC: 69 U/L (ref 13–56)
ANION GAP SERPL CALC-SCNC: 7 MMOL/L (ref 3–18)
AST SERPL-CCNC: 49 U/L (ref 10–38)
BASOPHILS # BLD: 0.1 K/UL (ref 0–0.1)
BASOPHILS NFR BLD: 1 % (ref 0–2)
BILIRUB SERPL-MCNC: 0.7 MG/DL (ref 0.2–1)
BUN SERPL-MCNC: 31 MG/DL (ref 7–18)
BUN/CREAT SERPL: 14 (ref 12–20)
CALCIUM SERPL-MCNC: 9.1 MG/DL (ref 8.5–10.1)
CHLORIDE SERPL-SCNC: 108 MMOL/L (ref 100–111)
CHOLEST SERPL-MCNC: 248 MG/DL
CO2 SERPL-SCNC: 24 MMOL/L (ref 21–32)
CREAT SERPL-MCNC: 2.25 MG/DL (ref 0.6–1.3)
CREAT UR-MCNC: 107 MG/DL (ref 30–125)
CRP SERPL-MCNC: 1.2 MG/DL (ref 0–0.3)
DIFFERENTIAL METHOD BLD: ABNORMAL
EOSINOPHIL # BLD: 0.2 K/UL (ref 0–0.4)
EOSINOPHIL NFR BLD: 2 % (ref 0–5)
ERYTHROCYTE [DISTWIDTH] IN BLOOD BY AUTOMATED COUNT: 15.2 % (ref 11.6–14.5)
EST. AVERAGE GLUCOSE BLD GHB EST-MCNC: 151 MG/DL
GLOBULIN SER CALC-MCNC: 2.9 G/DL (ref 2–4)
GLUCOSE SERPL-MCNC: 187 MG/DL (ref 74–99)
HBA1C MFR BLD: 6.9 % (ref 4.2–5.6)
HCT VFR BLD AUTO: 41.9 % (ref 35–45)
HDLC SERPL-MCNC: 32 MG/DL (ref 40–60)
HDLC SERPL: 7.8 (ref 0–5)
HGB BLD-MCNC: 13.4 G/DL (ref 12–16)
IMM GRANULOCYTES # BLD AUTO: 0 K/UL (ref 0–0.04)
IMM GRANULOCYTES NFR BLD AUTO: 1 % (ref 0–0.5)
LDLC SERPL CALC-MCNC: 149.2 MG/DL (ref 0–100)
LIPID PROFILE,FLP: ABNORMAL
LYMPHOCYTES # BLD: 1.4 K/UL (ref 0.9–3.6)
LYMPHOCYTES NFR BLD: 16 % (ref 21–52)
MCH RBC QN AUTO: 31.3 PG (ref 24–34)
MCHC RBC AUTO-ENTMCNC: 32 G/DL (ref 31–37)
MCV RBC AUTO: 97.9 FL (ref 78–100)
MICROALBUMIN UR-MCNC: 10.5 MG/DL (ref 0–3)
MICROALBUMIN/CREAT UR-RTO: 98 MG/G (ref 0–30)
MONOCYTES # BLD: 0.3 K/UL (ref 0.05–1.2)
MONOCYTES NFR BLD: 4 % (ref 3–10)
NEUTS SEG # BLD: 6.3 K/UL (ref 1.8–8)
NEUTS SEG NFR BLD: 76 % (ref 40–73)
NRBC # BLD: 0 K/UL (ref 0–0.01)
NRBC BLD-RTO: 0 PER 100 WBC
PLATELET # BLD AUTO: 183 K/UL (ref 135–420)
PMV BLD AUTO: 10.3 FL (ref 9.2–11.8)
POTASSIUM SERPL-SCNC: 4 MMOL/L (ref 3.5–5.5)
PROT SERPL-MCNC: 6.5 G/DL (ref 6.4–8.2)
RBC # BLD AUTO: 4.28 M/UL (ref 4.2–5.3)
SODIUM SERPL-SCNC: 139 MMOL/L (ref 136–145)
T4 FREE SERPL-MCNC: 0.5 NG/DL (ref 0.7–1.5)
TRIGL SERPL-MCNC: 334 MG/DL (ref ?–150)
TSH SERPL DL<=0.05 MIU/L-ACNC: 126 UIU/ML (ref 0.36–3.74)
URATE SERPL-MCNC: 8.8 MG/DL (ref 2.6–7.2)
VLDLC SERPL CALC-MCNC: 66.8 MG/DL
WBC # BLD AUTO: 8.3 K/UL (ref 4.6–13.2)

## 2023-01-06 PROCEDURE — 86140 C-REACTIVE PROTEIN: CPT

## 2023-01-06 PROCEDURE — 83036 HEMOGLOBIN GLYCOSYLATED A1C: CPT

## 2023-01-06 PROCEDURE — 84550 ASSAY OF BLOOD/URIC ACID: CPT

## 2023-01-06 PROCEDURE — 85025 COMPLETE CBC W/AUTO DIFF WBC: CPT

## 2023-01-06 PROCEDURE — 84439 ASSAY OF FREE THYROXINE: CPT

## 2023-01-06 PROCEDURE — 80061 LIPID PANEL: CPT

## 2023-01-06 PROCEDURE — 82043 UR ALBUMIN QUANTITATIVE: CPT

## 2023-01-06 PROCEDURE — 83520 IMMUNOASSAY QUANT NOS NONAB: CPT

## 2023-01-06 PROCEDURE — 36415 COLL VENOUS BLD VENIPUNCTURE: CPT

## 2023-01-06 PROCEDURE — 72114 X-RAY EXAM L-S SPINE BENDING: CPT

## 2023-01-06 PROCEDURE — 86225 DNA ANTIBODY NATIVE: CPT

## 2023-01-06 PROCEDURE — 80053 COMPREHEN METABOLIC PANEL: CPT

## 2023-01-06 RX ORDER — METHYLPREDNISOLONE 4 MG/1
TABLET ORAL
Qty: 1 DOSE PACK | Refills: 0 | Status: SHIPPED | OUTPATIENT
Start: 2023-01-06

## 2023-01-06 RX ORDER — CALCITRIOL 0.5 UG/1
CAPSULE ORAL
COMMUNITY
Start: 2022-10-08

## 2023-01-06 RX ORDER — CYCLOBENZAPRINE HCL 5 MG
5 TABLET ORAL
Qty: 40 TABLET | Refills: 2 | Status: SHIPPED | OUTPATIENT
Start: 2023-01-06 | End: 2023-01-11

## 2023-01-06 NOTE — PROGRESS NOTES
[facility]:  1/6/2023, MRN: [id number]      Silvia Pringle is a 60 y.o. female and presents to clinic for Joint Pain (Patient reports lower back, arm, and leg pain. Patient reports the pain feels like its all over. Patient reports intermittent swelling. )      Subjective: The patient is a 39-year-old female with history of right sided breast cancer s/p right mastectomy, colon cancer (2019) s/p surgery, b/l clear cell carcinoma s/p b/l partial nephrectomies, pulmonary nodule, chemotherapy induced CMO/CHF, CKD (followed by Juanita Angela), type 2 DM, suspected CLARENCE, multinodular goiter treated with a thyroidectomy, Covid-19 infection,  pacemaker placement (2021), postoperative hypothyroidism, HLD, GERD, vitamin D deficiency, and HTN. 1. Arthralgias and Back Pain: Today she reports: a 2 month h/o lower back pain b/l. She has no h/o trauma. Pain will sometimes radiate down her legs. Pain \"is like someone stabbing me in the back. \" No relief with tylenol, heating pads, and use of topical rx. She also reports a 1 month h/o \"everywhere pain. \" No triggers are known for \"everywhere pain. \" Legs and wrists can become swollen. She had swelling along her right foot/ankle from what she thought was gout. She was given 5 days of prednisone. It resolved after taking this rx. There was pain present but it resolved with prednisone. No paresthesias. 2. Renal Cancer Hx and CKD: Today she reports: no urinary sx. In remission. No new urinary sx. +H/o secondary hyperparathyroidism. Overdue for labs. Last apt with Nephrology: 2-3 months ago. 3. CHF: BP today: 125/75 on lasix, bumex, losartan, coreg, and imdur. No CP. 4. Type 2 DM: Diet controlled. Overdue for labs to assess her A1C. Her last A1C was >6 months ago and 6.5. +Statin.         ***1. Hypothyroidism: Her Synthroid dose was increased from 112 mcg daily to 125 mcg daily at her last visit due to her recent TFTs.   Follow-up TFTs were normal. A pitutary MRI was ordered but since her labs look good, we will hold off on this study. 2. Elevated Alkaline Phosphatase: Present for over a year. Etiology is unknown. Her level has been as high as the 400s range. +H/o transaminitis. She is scheduled for a check up with GI.      6/21/22 RUQ Ultrasound: Cholecystectomy without biliary duct dilatation. Left nephrectomy with bowel in the postsurgical bed. Evaluation of the abdominal aorta likely limited by bowel gas. Distal abdominal aorta tapering not evident. May benefit from continued ultrasound surveillance for aneurysm development. Mildly echogenic liver. Correlate for underlying liver disease. 6/21/22 DEXA:  BMD measures consistent with osteopenia/low bone density. 3. Fatigue: She has increased fatigue. +CKD. No CP or palpitations. No SOB. She is scheduled with Cardiology in September. +Suspected CLARENCE is untreated. +Snoring. +HAs - occur with her fatigue. 1/13/22 Echocardiogram: Mildly enlarged left ventricular cavity size with mild concentric left  ventricular hypertrophy. Left ventricular systolic function is mildly reduced with an ejection  fraction of 45 % by Perez's biplane. Mild global hypokinesis. Normal right ventricular cavity size with visually normal systolic  function. There is mild mitral valve regurgitation. Trace tricuspid and pulmonic regurgitation. Mild pulmonary hypertension, estimated pulmonary arterial systolic pressure is 40 mmHg. Comparison: Compared to prior study from 1/14/2021. Ejection fraction increased from 15% to 45%. Pulmonary artery pressure increased from 17 mmHg to 40 mmHg. 4. Suspected CLARENCE: Untreated. +Snoring. She has never had a sleep study per her hx.      5. Abdominal Mass: She has an area along her abdomen that she felt in between apts. It's above her umbilicus. It's getting larger. 6. Type 2 DM: Present for yrs. Diet controlled.  Her last A1C was 6.5.      7. General:  - She wants a handicap parking pass - She has b/l breast implants. Patient Active Problem List    Diagnosis Date Noted    Anemia in chronic kidney disease 06/27/2020    Breast cancer, right ([facility] 06/27/2020    Gout 06/27/2020    Secondary hyperparathyroidism of renal origin ([facility] 06/27/2020    Hypercholesteremia 11/23/2019    GERD (gastroesophageal reflux disease) 11/23/2019    Vitamin D deficiency 11/23/2019    Acquired hypothyroidism 11/23/2019    HTN (hypertension) 11/23/2019    Pulmonary nodule 11/23/2019    Malignant tumor of descending colon ([facility] 07/22/2019    Chronic systolic congestive heart failure ([facility] 11/15/2017    Chronic kidney disease (CKD) stage G4/A1, severely decreased glomerular filtration rate (GFR) between 15-29 mL/min/1.73 square meter and albuminuria creatinine ratio less than 30 mg/g ([facility]) 04/13/2017    Malignant neoplasm of left kidney ([facility] 04/04/2016    Clear cell carcinoma of kidney ([facility] 03/22/2016    Toxic multinodular goiter 12/04/2015    CLARENCE (obstructive sleep apnea) 11/09/2015    Chemotherapy induced cardiomyopathy ([facility] 08/11/2015    Type 2 diabetes mellitus with stage 4 chronic kidney disease, without long-term current use of insulin ([facility]) 08/03/2015       Current Outpatient Medications   Medication Sig Dispense Refill    calcitRIOL (ROCALTROL) 0.5 mcg capsule       atorvastatin (LIPITOR) 40 mg tablet TAKE 1 TABLET BY MOUTH EVERY DAY 90 Tablet 0    levothyroxine (SYNTHROID) 125 mcg tablet TAKE 1 TABLET BY MOUTH EVERY DAY BEFORE BREAKFAST on Tuesdays, Thursdays, and Saturdays 90 Tablet 0    levothyroxine (SYNTHROID) 112 mcg tablet Take 112mcg po q am before breakfast on Mondays, Wednesdays, Fridays, and Sundays. 90 Tablet 0    losartan (COZAAR) 25 mg tablet Take  by mouth daily.  lansoprazole (PREVACID) 30 mg capsule TAKE 1 CAPSULE BY MOUTH EVERY DAY 90 Capsule 3    Xarelto 20 mg tab tablet       bumetanide (BUMEX) 1 mg tablet Take 1 mg by mouth daily.       isosorbide mononitrate ER (IMDUR) 30 mg tablet Take 30 mg by mouth daily.  DULoxetine (CYMBALTA) 60 mg capsule       allopurinoL (ZYLOPRIM) 100 mg tablet TAKE 1 TABLET BY MOUTH EVERY DAY      COREG 25 mg tablet Take 25 mg by mouth two (2) times a day.  0    furosemide (LASIX) 40 mg tablet Take  by mouth daily.  ondansetron (ZOFRAN ODT) 8 mg disintegrating tablet Take 1 Tab by mouth every eight (8) hours as needed for Nausea or Vomiting.  (Patient not taking: No sig reported) 20 Tab 0       Allergies   Allergen Reactions    Codeine Swelling, Other (comments) and Not Reported This Time     Throat Swelling    Penicillins Other (comments) and Angioedema     N/V, swelling    Tramadol Swelling    Sulfa (Sulfonamide Antibiotics) Other (comments)     itching    Sulfur Hives       Past Medical History:   Diagnosis Date    A-fib [facility]     Cancer ([facility] 2000    Right breast ca     Colon cancer ([facility]     Congestive heart failure, unspecified     Gout     H/O total mastectomy of right breast     Heart disease     Hx: UTI (urinary tract infection)     Hypercholesterolemia     Iron deficiency anemia     Nausea & vomiting     Renal cell carcinoma of left kidney ([address] 12/17/15    Renal mass, left     Thyroid cancer ([address]     2017       Past Surgical History:   Procedure Laterality Date    HX CHOLECYSTECTOMY      HX MASTECTOMY Right     HX NEPHRECTOMY Right 3/22/16    Partial x2, Dr. James Mensah, [facility]    HX ORTHOPAEDIC  04/19/2017    Trigger finger release R hand    HX RENAL BIOPSY Right 12/17/15    Ct guided bx, Dr. Leo Gaitan, [facility]    HX THYROIDECTOMY  6/20/2018    IR INSERT TUNL CVC W PORT OVER 5 YEARS  10/15/2019    IR REMOVE TUNL CVAD W PORT/PUMP  3/5/2021       Family History   Problem Relation Age of Onset    Hypertension Father     Heart Attack Father     Heart Failure Father     Diabetes Father        Social History     Tobacco Use    Smoking status: Never    Smokeless tobacco: Never   Substance Use Topics    Alcohol use: No       ROS   Review of Systems   Constitutional:  Negative for chills and fever. HENT:  Negative for ear pain and sore throat. Eyes:  Negative for blurred vision and pain. Respiratory:  Negative for cough and shortness of breath. Cardiovascular:  Negative for chest pain. Gastrointestinal:  Positive for heartburn. Negative for abdominal pain, blood in stool and melena. Genitourinary:  Negative for dysuria and hematuria. Musculoskeletal:  Positive for back pain and joint pain. Negative for myalgias. Skin:  Negative for rash. Neurological:  Negative for tingling and headaches. Endo/Heme/Allergies:  Does not bruise/bleed easily. Psychiatric/Behavioral:  Negative for substance abuse. Objective     Vitals:    01/06/23 0845   BP: 125/75   Pulse: 80   Resp: 14   Temp: 97.6 °F (36.4 °C)   TempSrc: Temporal   SpO2: 96%   Weight: 196 lb (88.9 kg)   Height: 5' 6\" (1.676 m)   PainSc:   5   PainLoc: Generalized       Physical Exam  Chest:      Chest wall: No tenderness. Musculoskeletal:         General: No swelling (BUE) or tenderness (BUE). Comments: Positive right straight leg raise test. Lumbar paraspinal muscles are TTP b/l. She has TTP along her lumbar spinous processes.  The remainder of her back exam is unremarkable     LABS   Data Review:   Lab Results   Component Value Date/Time    WBC 7.7 06/01/2022 10:45 AM    HGB 13.5 06/01/2022 10:45 AM    HCT 41.8 06/01/2022 10:45 AM    PLATELET 971 05/55/0213 10:45 AM    MCV 97.9 06/01/2022 10:45 AM       Lab Results   Component Value Date/Time    Sodium 142 06/01/2022 10:45 AM    Potassium 4.4 07/21/2022 03:33 AM    Chloride 108 06/01/2022 10:45 AM    CO2 25 06/01/2022 10:45 AM    Anion gap 9 06/01/2022 10:45 AM    Glucose 127 (H) 06/01/2022 10:45 AM    BUN 42 (H) 06/01/2022 10:45 AM    Creatinine 2.63 (H) 06/01/2022 10:45 AM    BUN/Creatinine ratio 16 06/01/2022 10:45 AM    GFR est AA 22 (L) 06/01/2022 10:45 AM    GFR est non-AA 18 (L) 2022 10:45 AM    Calcium 9.5 2022 10:45 AM    Calcium 9.4 2022 10:45 AM       Lab Results   Component Value Date/Time    Cholesterol, total 196 2022 10:45 AM    HDL Cholesterol 36 (L) 2022 10:45 AM    LDL, calculated 117.2 (H) 2022 10:45 AM    VLDL, calculated 42.8 2022 10:45 AM    Triglyceride 214 (H) 2022 10:45 AM    CHOL/HDL Ratio 5.4 (H) 2022 10:45 AM       Lab Results   Component Value Date/Time    Hemoglobin A1c 6.5 (H) 2022 10:45 AM    Hemoglobin A1c (POC) 6.2 2015 12:27 PM       Assessment/Plan:   There are no diagnoses linked to this encounter. Health Maintenance Due   Topic Date Due    Shingles Vaccine (1 of 2) Never done    Cervical cancer screen  Never done    Eye Exam Retinal or Dilated  2018    Foot Exam Q1  2020    COVID-19 Vaccine (4 - Booster for Pfizer series) 2022    Flu Vaccine (1) 2022         Lab review: {lab reviewed:682162}    I have discussed the diagnosis with the patient and the intended plan as seen in the above orders. The patient has received an after-visit summary and questions were answered concerning future plans. I have discussed medication side effects and warnings with the patient as well. I have reviewed the plan of care with the patient, accepted their input and they are in agreement with the treatment goals. All questions were answered. The patient understands the plan of care. Handouts provided today with above information. ***Pt instructed if symptoms worsen to call the office or report to the ED for continued care. ***Greater than 50% of the visit time was spent in counseling and/or coordination of care. ***The patient was counseled on the dangers of tobacco use, and was {MU SMOKING CESSATION COUNSELIN::\"advised to quit\"}. Reviewed strategies to maximize success, including {techniques:}. 3-10 minutes were spent on smoking/tobacco cessation    Voice recognition was used to generate this report, which may have resulted in some phonetic based errors in grammar and contents. Even though attempts were made to correct all the mistakes, some may have been missed, and remained in the body of the document.           Laura Duong MD discussed medication side effects and warnings with the patient as well. I have reviewed the plan of care with the patient, accepted their input and they are in agreement with the treatment goals. All questions were answered. The patient understands the plan of care. Handouts provided today with above information. Pt instructed if symptoms worsen to call the office or report to the ED for continued care. Greater than 50% of the visit time was spent in counseling and/or coordination of care. Voice recognition was used to generate this report, which may have resulted in some phonetic based errors in grammar and contents. Even though attempts were made to correct all the mistakes, some may have been missed, and remained in the body of the document.           Jean Xiao MD

## 2023-01-06 NOTE — PROGRESS NOTES
Miguel Wong presents today for   Chief Complaint   Patient presents with    Joint Pain     Patient reports lower back, arm, and leg pain. Patient reports the pain feels like its all over. Patient reports intermittent swelling. 1. \"Have you been to the ER, urgent care clinic since your last visit? Hospitalized since your last visit? \" no    2. \"Have you seen or consulted any other health care providers outside of the [address] [place] [address] since your last visit? \" yes     3. For patients aged 39-70: Has the patient had a colonoscopy / FIT/ Cologuard? Yes - no Care Gap present      If the patient is female:    4. For patients aged 41-77: Has the patient had a mammogram within the past 2 years? Yes - no Care Gap present  See top three    5. For patients aged 21-65: Has the patient had a pap smear?  No

## 2023-01-08 DIAGNOSIS — E03.9 ACQUIRED HYPOTHYROIDISM: Primary | ICD-10-CM

## 2023-01-08 LAB
CENTROMERE B AB SER-ACNC: <0.2 AI (ref 0–0.9)
CHROMATIN AB SERPL-ACNC: <0.2 AI (ref 0–0.9)
DSDNA AB SER-ACNC: <1 IU/ML (ref 0–9)
ENA JO1 AB SER-ACNC: <0.2 AI (ref 0–0.9)
ENA RNP AB SER-ACNC: 0.2 AI (ref 0–0.9)
ENA SCL70 AB SER-ACNC: <0.2 AI (ref 0–0.9)
ENA SM AB SER-ACNC: <0.2 AI (ref 0–0.9)
ENA SS-A AB SER-ACNC: <0.2 AI (ref 0–0.9)
ENA SS-B AB SER-ACNC: <0.2 AI (ref 0–0.9)
SEE BELOW, 164869: NORMAL

## 2023-01-08 RX ORDER — LEVOTHYROXINE SODIUM 137 UG/1
137 TABLET ORAL
Qty: 30 TABLET | Refills: 5 | Status: SHIPPED | OUTPATIENT
Start: 2023-01-08

## 2023-01-08 NOTE — PROGRESS NOTES
Please let her know that her labs show improvement in her renal function with her creatinine measuring 2.25. BUN is 31. LFTs are still elevated with her ALT measuring 69 and her AST measuring 49. Her alkaline phosphatase level is lower measuring 370, down from 408. His total cholesterol is up to 248 from 196. Her triglycerides are 334, up from 214. HDL Is 32. LDL Is up to 149, from 117 seven months ago. Her cholesterol worsened. Her TSH Is very high measuring 126. fT4 is low at 0. 5! Her uric acid level is very high at 8.8. This is c/w gout. Her inflammatory marker is up to 1.2 indicating active inflammation - likely from gout. There is no significant changes to her blood counts. No anemia. Her A1C is up to 6.9 from 6.5. She needs to reduce her carb intake or she will need medication to control her diabetes. Please let her know that I am increasing her Synthroid to 137mcg daily from alternating doses of 125mcg and 112mcg per her recent labs. Please have her scheduled for labs 1 wk before her follow up apt to recheck her TFTs.      Dr. Ch Apo  [facility], [address] [place] [address], [address] [place] [address] Str.  Phone: [phone]  Fax: [phone]

## 2023-01-10 ENCOUNTER — TELEPHONE (OUTPATIENT)
Dept: INTERNAL MEDICINE CLINIC | Age: 63
End: 2023-01-10

## 2023-01-10 DIAGNOSIS — G89.29 CHRONIC LOW BACK PAIN, UNSPECIFIED BACK PAIN LATERALITY, UNSPECIFIED WHETHER SCIATICA PRESENT: ICD-10-CM

## 2023-01-10 DIAGNOSIS — M43.10 ANTEROLISTHESIS: Primary | ICD-10-CM

## 2023-01-10 DIAGNOSIS — M54.50 CHRONIC LOW BACK PAIN, UNSPECIFIED BACK PAIN LATERALITY, UNSPECIFIED WHETHER SCIATICA PRESENT: ICD-10-CM

## 2023-01-10 NOTE — TELEPHONE ENCOUNTER
----- Message from Severiano Hughes MD sent at 1/8/2023  2:34 PM EST -----  Please let her know that her labs show improvement in her renal function with her creatinine measuring 2.25. BUN is 31. LFTs are still elevated with her ALT measuring 69 and her AST measuring 49. Her alkaline phosphatase level is lower measuring 370, down from 408. His total cholesterol is up to 248 from 196. Her triglycerides are 334, up from 214. HDL Is 32. LDL Is up to 149, from 117 seven months ago. Her cholesterol worsened. Her TSH Is very high measuring 126. fT4 is low at 0. 5! Her uric acid level is very high at 8.8. This is c/w gout. Her inflammatory marker is up to 1.2 indicating active inflammation - likely from gout. There is no significant changes to her blood counts. No anemia. Her A1C is up to 6.9 from 6.5. She needs to reduce her carb intake or she will need medication to control her diabetes. Please let her know that I am increasing her Synthroid to 137mcg daily from alternating doses of 125mcg and 112mcg per her recent labs. Please have her scheduled for labs 1 wk before her follow up apt to recheck her TFTs.      Dr. Carvajal [facility]  [facility] [address], [facility] [address]  Phone: [phone]  Fax: [phone]

## 2023-01-11 DIAGNOSIS — G89.29 CHRONIC LOW BACK PAIN, UNSPECIFIED BACK PAIN LATERALITY, UNSPECIFIED WHETHER SCIATICA PRESENT: Primary | ICD-10-CM

## 2023-01-11 DIAGNOSIS — M54.50 CHRONIC LOW BACK PAIN, UNSPECIFIED BACK PAIN LATERALITY, UNSPECIFIED WHETHER SCIATICA PRESENT: Primary | ICD-10-CM

## 2023-01-11 RX ORDER — TIZANIDINE 2 MG/1
2 TABLET ORAL
Qty: 30 TABLET | Refills: 3 | Status: SHIPPED | OUTPATIENT
Start: 2023-01-11

## 2023-01-11 NOTE — TELEPHONE ENCOUNTER
Patient contacted, patient identified with two identifiers (Name & ). Patient aware of results per DR. Dennis and verbalizes understanding.

## 2023-01-11 NOTE — PROGRESS NOTES
Please let her know that her lumbar x-ray series shows findings consistent with arthritis and slippage of her L5 vertebral body bone along her S1 joint. This is likely contributing to her lower back pain sx. Please have her see Orthopedics.     Dr. Katty Johns  [facility], [address] [place] [address], [address] [place] [address] Str.  Phone: [phone]  Fax: [phone]

## 2023-01-11 NOTE — TELEPHONE ENCOUNTER
----- Message from Shannon Castro MD sent at 1/11/2023  8:47 AM EST -----  Please let her know that her lumbar x-ray series shows findings consistent with arthritis and slippage of her L5 vertebral body bone along her S1 joint. This is likely contributing to her lower back pain sx. Please have her see Orthopedics.     Dr. Katty Johns  [facility] of [address], [address] [place] [address] Str.  Phone: [phone]  Fax: [phone]

## 2023-01-11 NOTE — TELEPHONE ENCOUNTER
Patient states she was unable to  the flexeril from the pharmacy. Pharmacy reached and they stated that the flexeril was contraindicated due to patients history of heart failure, and hyperthyroidism. I verified with pharmacist that there were no medication interactions. Please advise if okay to fill.

## 2023-01-15 LAB
14.3.3 ETA, RHEUM. ARTHRITIS: ABNORMAL NG/ML
CCP IGA+IGG SERPL IA-ACNC: ABNORMAL UNITS
RHEUMATOID FACT SERPL-ACNC: >650 UNITS/ML

## 2023-01-16 DIAGNOSIS — M54.50 CHRONIC LOW BACK PAIN, UNSPECIFIED BACK PAIN LATERALITY, UNSPECIFIED WHETHER SCIATICA PRESENT: ICD-10-CM

## 2023-01-16 DIAGNOSIS — G89.29 CHRONIC LOW BACK PAIN, UNSPECIFIED BACK PAIN LATERALITY, UNSPECIFIED WHETHER SCIATICA PRESENT: ICD-10-CM

## 2023-01-16 RX ORDER — TIZANIDINE 2 MG/1
2 TABLET ORAL
Qty: 30 TABLET | Refills: 3 | Status: SHIPPED | OUTPATIENT
Start: 2023-01-16

## 2023-01-18 DIAGNOSIS — M05.80 POLYARTHRITIS WITH POSITIVE RHEUMATOID FACTOR (HCC): Primary | ICD-10-CM

## 2023-01-30 DIAGNOSIS — Z12.31 OTHER SCREENING MAMMOGRAM: Primary | ICD-10-CM

## 2023-02-03 DIAGNOSIS — Z12.31 OTHER SCREENING MAMMOGRAM: Primary | ICD-10-CM

## 2023-02-13 ENCOUNTER — TELEPHONE (OUTPATIENT)
Age: 63
End: 2023-02-13

## 2023-02-13 NOTE — TELEPHONE ENCOUNTER
Pharmacy, [facility] [address], states prescription for lansoprazole (PREVACID) 30 mg capsule needs a prior auth, but the insurance won't send them the form they usually will fax us.     Please advise, thank you

## 2023-02-14 ENCOUNTER — HOSPITAL ENCOUNTER (OUTPATIENT)
Facility: HOSPITAL | Age: 63
Setting detail: SPECIMEN
Discharge: HOME | End: 2023-02-17
Payer: COMMERCIAL

## 2023-02-14 LAB
T4 FREE SERPL-MCNC: 2 NG/DL (ref 0.7–1.5)
TSH SERPL DL<=0.05 MIU/L-ACNC: 6.62 UIU/ML (ref 0.36–3.74)

## 2023-02-14 PROCEDURE — 36415 COLL VENOUS BLD VENIPUNCTURE: CPT

## 2023-02-14 PROCEDURE — 84443 ASSAY THYROID STIM HORMONE: CPT

## 2023-02-17 ENCOUNTER — OFFICE VISIT (OUTPATIENT)
Age: 63
End: 2023-02-17
Payer: COMMERCIAL

## 2023-02-17 VITALS
HEART RATE: 81 BPM | TEMPERATURE: 97.3 F | OXYGEN SATURATION: 93 % | WEIGHT: 189 LBS | BODY MASS INDEX: 30.51 KG/M2 | DIASTOLIC BLOOD PRESSURE: 97 MMHG | SYSTOLIC BLOOD PRESSURE: 145 MMHG

## 2023-02-17 DIAGNOSIS — C64.2 MALIGNANT NEOPLASM OF LEFT KIDNEY (HCC): ICD-10-CM

## 2023-02-17 DIAGNOSIS — I10 HYPERTENSION, UNSPECIFIED TYPE: ICD-10-CM

## 2023-02-17 DIAGNOSIS — E11.22 TYPE 2 DIABETES MELLITUS WITH CHRONIC KIDNEY DISEASE, WITHOUT LONG-TERM CURRENT USE OF INSULIN, UNSPECIFIED CKD STAGE (HCC): ICD-10-CM

## 2023-02-17 DIAGNOSIS — I50.22 CHRONIC SYSTOLIC CONGESTIVE HEART FAILURE (HCC): ICD-10-CM

## 2023-02-17 DIAGNOSIS — E03.9 ACQUIRED HYPOTHYROIDISM: Primary | ICD-10-CM

## 2023-02-17 DIAGNOSIS — R76.8 RHEUMATOID FACTOR POSITIVE: ICD-10-CM

## 2023-02-17 DIAGNOSIS — E78.5 HYPERLIPIDEMIA, UNSPECIFIED HYPERLIPIDEMIA TYPE: ICD-10-CM

## 2023-02-17 DIAGNOSIS — R74.01 TRANSAMINITIS: ICD-10-CM

## 2023-02-17 DIAGNOSIS — N18.4 CHRONIC KIDNEY DISEASE, STAGE 4 (SEVERE) (HCC): ICD-10-CM

## 2023-02-17 DIAGNOSIS — C18.6 MALIGNANT TUMOR OF DESCENDING COLON (HCC): ICD-10-CM

## 2023-02-17 DIAGNOSIS — R79.89 HIGH SERUM THYROID STIMULATING HORMONE (TSH): ICD-10-CM

## 2023-02-17 DIAGNOSIS — M25.50 ARTHRALGIA, UNSPECIFIED JOINT: ICD-10-CM

## 2023-02-17 DIAGNOSIS — R51.9 NONINTRACTABLE HEADACHE, UNSPECIFIED CHRONICITY PATTERN, UNSPECIFIED HEADACHE TYPE: ICD-10-CM

## 2023-02-17 DIAGNOSIS — M54.50 LOW BACK PAIN, UNSPECIFIED BACK PAIN LATERALITY, UNSPECIFIED CHRONICITY, UNSPECIFIED WHETHER SCIATICA PRESENT: ICD-10-CM

## 2023-02-17 PROCEDURE — 3080F DIAST BP >= 90 MM HG: CPT | Performed by: INTERNAL MEDICINE

## 2023-02-17 PROCEDURE — 99214 OFFICE O/P EST MOD 30 MIN: CPT | Performed by: INTERNAL MEDICINE

## 2023-02-17 PROCEDURE — 3077F SYST BP >= 140 MM HG: CPT | Performed by: INTERNAL MEDICINE

## 2023-02-17 PROCEDURE — 3044F HG A1C LEVEL LT 7.0%: CPT | Performed by: INTERNAL MEDICINE

## 2023-02-17 RX ORDER — LANSOPRAZOLE 30 MG/1
CAPSULE, DELAYED RELEASE ORAL
Qty: 30 CAPSULE | Refills: 11 | Status: SHIPPED | OUTPATIENT
Start: 2023-02-17

## 2023-02-17 RX ORDER — EZETIMIBE 10 MG/1
10 TABLET ORAL DAILY
Qty: 30 TABLET | Refills: 3 | Status: SHIPPED | OUTPATIENT
Start: 2023-02-17

## 2023-02-17 RX ORDER — LEVOTHYROXINE SODIUM 0.12 MG/1
125 TABLET ORAL DAILY
Qty: 90 TABLET | Refills: 1 | Status: SHIPPED | OUTPATIENT
Start: 2023-02-17

## 2023-02-17 ASSESSMENT — PATIENT HEALTH QUESTIONNAIRE - PHQ9
2. FEELING DOWN, DEPRESSED OR HOPELESS: 0
1. LITTLE INTEREST OR PLEASURE IN DOING THINGS: 0
SUM OF ALL RESPONSES TO PHQ QUESTIONS 1-9: 0
SUM OF ALL RESPONSES TO PHQ9 QUESTIONS 1 & 2: 0
SUM OF ALL RESPONSES TO PHQ QUESTIONS 1-9: 0

## 2023-02-17 ASSESSMENT — ENCOUNTER SYMPTOMS
SHORTNESS OF BREATH: 0
EYE PAIN: 0
SORE THROAT: 0
BLOOD IN STOOL: 0
ABDOMINAL PAIN: 0
BACK PAIN: 1
COUGH: 0
ANAL BLEEDING: 0

## 2023-02-17 NOTE — PROGRESS NOTES
Pt here to follow up back pain which has gotten worse over time. Pt is scheduled to see Dr. Caty Walker on 2/22/23.

## 2023-02-17 NOTE — PROGRESS NOTES
INTERNISTS OF [facility]:  2/17/2023, MRN: [id number]      Clint Avendaño is a 60 y.o. female and presents to clinic for Back Pain      Subjective: The patient is a 58-year-old female with history of right sided breast cancer s/p right mastectomy, colon cancer (2019) s/p surgery, b/l clear cell carcinoma s/p b/l partial nephrectomies, pulmonary nodule, chemotherapy induced CMO/CHF, osteopenia, CKD (followed by Milly Schwab), type 2 DM, suspected GALLO, multinodular goiter treated with a thyroidectomy, Covid-19 infection,  pacemaker placement (2021), postoperative hypothyroidism, HLD, GERD, vitamin D deficiency, and HTN. 1.  Arthralgia History and Sciatica: At her last appointment, she reported a 2-month history of lower back pain bilaterally despite no history of trauma. Pain that radiates sometimes down her legs. Pain is sharp and stabbing in quality. There is no relief with use of Tylenol, heating pads, or topical Rx. At that time, she also reported a 1 month history of generalized aches and pains. No triggers are known. She reported leg and wrist swelling off and on that responded well to 5 days of prednisone. At her last appointment, I ordered a Medrol Dosepak and lumbar x-ray series. Labs sent in between appointments showed a normal Sjogren panel. Her RNP antibody was unremarkable. Her rheumatoid factor was very high at 650. She was negative for CCP antibodies. Her uric acid level was elevated at 8.8. Her CRP is elevated at 1.2. Today she reports: she has not had improvement in her lower back burning pain since her last apt despite taking a medrol dose pack. She is scheduled with Orthopedics next wk. Lumbar Xray Series: 1/6/23:   No acute osseous findings. Grade 1 anterolisthesis of L5 with respect S1. No subluxation on dynamic   imaging. 2.  Postoperative hypothyroidism: Status post a thyroidectomy for a multinodular goiter.   At her last appointment, her TSH measured 126 and her free T4 measure low at 0.5. At that time she was taking: Synthroid 112 mcg daily on Mondays, Wednesdays, Fridays, and Sundays and 125 mcg on Tuesdays, Thursdays, and Saturdays. As result, I discontinued his regimen and place her on 137 mcg daily of Synthroid. Her recent follow-up labs show: Her TSH is 6.62 and elevated. Her free T4 is elevated at 2. She had a 2-3 day h/o confusion. She's been getting Has over the past 2 wks. They can last hours and occur randomly. No triggers. 3.  Type 2 diabetes: Her most recent labs show that her A1c is 6.9. She is diet controlled. Her A1C a year ago was 6.5. Her creatinine was 2.25, below her baseline. 4. HLD: Her most recent labs show that her total cholesterol is 248. Her HDL is 32. Her LDL is 149. Her triglycerides are 334. On lipitor. 5. Transaminitis: AST and ALT were elevated per her January labs, measuring 49 and 69 respectively. Her alkaline phosphatase level was also elevated at 370. Her alk phos isoenzymes show that her level is 73% from the liver. +Taking lipitor. 6/21/22 RUQ Ultrasound: Cholecystectomy without biliary duct dilatation. Left nephrectomy with bowel in the postsurgical bed. Evaluation of the abdominal aorta likely limited by bowel gas. Distal abdominal aorta tapering not evident. May benefit from continued ultrasound surveillance for aneurysm development. Mildly echogenic liver. Correlate for underlying liver disease. 6. HTN/CHF and CKD: Her creatinine is at her lower baseline measuring 2.25. Although her blood pressure is falsely elevated today at 145/97, it was 125/75 last month. She takes Bumex, carvedilol, Imdur, losartan, and Xarelto. 1/13/22 Echocardiogram: Mildly enlarged left ventricular cavity size with mild concentric left  ventricular hypertrophy. Left ventricular systolic function is mildly reduced with an ejection  fraction of 45 % by Garcia's biplane. Mild global hypokinesis.   Normal right ventricular cavity size with visually normal systolic  function. There is mild mitral valve regurgitation. Trace tricuspid and pulmonic regurgitation. Mild pulmonary hypertension, estimated pulmonary arterial systolic pressure is 40 mmHg. Comparison: Compared to prior study from 1/14/2021. Ejection fraction increased from 15% to 45%. Pulmonary artery pressure increased from 17 mmHg to 40 mmHg.        Patient Active Problem List    Diagnosis Date Noted    Gout 06/27/2020    Secondary hyperparathyroidism of renal origin ([facility] 06/27/2020    Anemia in chronic kidney disease 06/27/2020    Breast cancer, right ([facility] 06/27/2020    Vitamin D deficiency 11/23/2019    GERD (gastroesophageal reflux disease) 11/23/2019    HTN (hypertension) 11/23/2019    Acquired hypothyroidism 11/23/2019    Hypercholesteremia 11/23/2019    Pulmonary nodule 11/23/2019    Malignant tumor of descending colon ([facility] 07/22/2019    Chronic systolic congestive heart failure ([facility] 11/15/2017    Chronic kidney disease (CKD) stage G4/A1, severely decreased glomerular filtration rate (GFR) between 15-29 mL/min/1.73 square meter and albuminuria creatinine ratio less than 30 mg/g ([facility] 04/13/2017    Malignant neoplasm of left kidney ([facility] 04/04/2016    Clear cell carcinoma of kidney ([facility] 03/22/2016    Toxic multinodular goiter 12/04/2015    GALLO (obstructive sleep apnea) 11/09/2015    Chemotherapy induced cardiomyopathy ([facility] 08/11/2015    Type 2 diabetes mellitus with stage 4 chronic kidney disease, without long-term current use of insulin (HCC) 08/03/2015       Current Outpatient Medications   Medication Sig Dispense Refill    levothyroxine (SYNTHROID) 125 MCG tablet Take 1 tablet by mouth daily 90 tablet 1    ezetimibe (ZETIA) 10 MG tablet Take 1 tablet by mouth daily 30 tablet 3    lansoprazole (PREVACID) 30 MG delayed release capsule TAKE 1 CAPSULE BY MOUTH EVERY DAY 30 capsule 11    allopurinol (ZYLOPRIM) 100 MG tablet TAKE 1 TABLET BY MOUTH EVERY DAY atorvastatin (LIPITOR) 40 MG tablet TAKE 1 TABLET BY MOUTH EVERY DAY      bumetanide (BUMEX) 1 MG tablet Take 1 mg by mouth daily      calcitRIOL (ROCALTROL) 0.5 MCG capsule ceived the following from [facility]: Outside name: calcitRIOL (ROCALTROL) 0.5 mcg capsule      carvedilol (COREG) 25 MG tablet Take 25 mg by mouth 2 times daily      DULoxetine (CYMBALTA) 60 MG extended release capsule ceived the following from [facility]: Outside name: DULoxetine (CYMBALTA) 60 mg capsule      isosorbide mononitrate (IMDUR) 30 MG extended release tablet Take 30 mg by mouth daily      losartan (COZAAR) 25 MG tablet Take by mouth daily      rivaroxaban (XARELTO) 20 MG TABS tablet ceived the following from [facility]: Outside name: Xarelto 20 mg tab tablet      tiZANidine (ZANAFLEX) 2 MG tablet Take 2 mg by mouth daily as needed       No current facility-administered medications for this visit.        Allergies   Allergen Reactions    Codeine Other (See Comments) and Swelling     Other reaction(s): Not Reported This Time  Throat Swelling    Penicillins Angioedema and Other (See Comments)     N/V, swelling    Tramadol Swelling    Sulfa Antibiotics Other (See Comments)     itching    Sulfur Hives       Past Medical History:   Diagnosis Date    A-fib ([facility]     Cancer ([facility] 2000    Right breast ca     Colon cancer ([facility]     Congestive heart failure, unspecified     Gout     H/O total mastectomy of right breast     Heart disease     Hx: UTI (urinary tract infection)     Hypercholesterolemia     Iron deficiency anemia     Nausea & vomiting     Renal cell carcinoma of left kidney ([facility] 12/17/15    Renal mass, left     Thyroid cancer ([facility]     2017       Past Surgical History:   Procedure Laterality Date    CHOLECYSTECTOMY      IR PORT PLACEMENT EQUAL OR GREATER THAN 5 YEARS  10/15/2019    IR PORT PLACEMENT EQUAL OR GREATER THAN 5 YEARS 10/15/2019 [facility] RAD ANGIO IR    IR PORT PLACEMENT EQUAL OR GREATER THAN 5 YEARS  10/15/2019    IR REMOVE TUNNELED VAD W PORT  3/5/2021    KIDNEY BIOPSY Right 12/17/15    Ct guided bx, Dr. Willi Lott, [facility] Right 3/22/16    Partial x2, Dr. Naomi Thayer, [address] [facility] Right     ORTHOPEDIC SURGERY  04/19/2017    Trigger finger release R hand    THYROIDECTOMY  6/20/2018       Family History   Problem Relation Age of Onset    Hypertension Father     Diabetes Father     Heart Failure Father     Heart Attack Father        Social History     Tobacco Use    Smoking status: Never    Smokeless tobacco: Never   Substance Use Topics    Alcohol use: No       ROS   Review of Systems   Constitutional:  Negative for fever. HENT:  Negative for ear pain and sore throat. Eyes:  Negative for pain and visual disturbance. Respiratory:  Negative for cough and shortness of breath. Cardiovascular:  Negative for chest pain. Gastrointestinal:  Negative for abdominal pain, anal bleeding and blood in stool. Genitourinary:  Negative for dysuria and hematuria. Musculoskeletal:  Positive for arthralgias and back pain. Negative for myalgias. Skin:  Negative for rash. Neurological:  Positive for headaches. Hematological:  Does not bruise/bleed easily. Psychiatric/Behavioral:  Negative for suicidal ideas. Objective     BP (!) 145/97 (Site: Left Upper Arm, Position: Sitting)   Pulse 81   Temp 97.3 °F (36.3 °C) (Tympanic)   Wt 189 lb (85.7 kg)   SpO2 93%   BMI 30.51 kg/m²      Physical Exam  Constitutional:       Appearance: Normal appearance. HENT:      Head: Normocephalic and atraumatic. Right Ear: External ear normal.      Left Ear: External ear normal.   Eyes:      General: No scleral icterus. Right eye: No discharge. Left eye: No discharge. Conjunctiva/sclera: Conjunctivae normal.   Cardiovascular:      Rate and Rhythm: Normal rate and regular rhythm. Pulses: Normal pulses. Heart sounds: Normal heart sounds. Pulmonary:      Effort: Pulmonary effort is normal.      Breath sounds: Normal breath sounds. Abdominal:      General: Abdomen is flat. Bowel sounds are normal. There is no distension. Palpations: Abdomen is soft. Tenderness: There is no abdominal tenderness. Musculoskeletal:         General: No swelling (BUE) or tenderness (BUE). Cervical back: Neck supple. Comments: Her lumbar spinous processes are TTP   Lymphadenopathy:      Cervical: No cervical adenopathy. Skin:     General: Skin is warm and dry. Findings: No erythema. Neurological:      Mental Status: She is alert. Mental status is at baseline. Gait: Gait normal.   Psychiatric:         Mood and Affect: Mood normal.         LABS   Data Review:   Lab Results   Component Value Date/Time    WBC 8.3 01/06/2023 09:35 AM    HGB 13.4 01/06/2023 09:35 AM    HCT 41.9 01/06/2023 09:35 AM     01/06/2023 09:35 AM    MCV 97.9 01/06/2023 09:35 AM       Lab Results   Component Value Date/Time     01/06/2023 09:35 AM    K 4.0 01/06/2023 09:35 AM     01/06/2023 09:35 AM    CO2 24 01/06/2023 09:35 AM    CO2 20 09/17/2019 11:48 AM    BUN 31 01/06/2023 09:35 AM    GFRAA 22 06/01/2022 10:45 AM       Lab Results   Component Value Date/Time    CHOL 248 01/06/2023 09:35 AM    HDL 32 01/06/2023 09:35 AM       No results found for: HBA1C, [id number]    Assessment/Plan:   1. Type 2 diabetes mellitus with chronic kidney disease: Worsening. Her A1c is up to 6.9.  -I stressed the importance of maintaining a diabetic renal diet. - We will need to recheck her labs in 4 months. 2. HTN/CHF and CKD: Her blood pressure is falsely elevated today. Her most recent labs show that her creatinine is below her baseline.  -Continue medication as prescribed. - She will return to clinic for another BP check.     3. Arthralgia: +Positive RF and elevated CRP  -She was given the contact information to  and encouraged to schedule an appointment. 4. Acquired hypothyroidism: It is unusual for her TSH and free T4 to be elevated. +Having HAs.+H/o colon and renal cancers - in remission.  -I will reduce her Synthroid dose to 125 mcg daily versus 137 mcg daily. We will check follow-up TFTs in 6 weeks. - I am placing a referral to an endocrinologist for assistance.  -Ordering an MRI without contrast    5. Hyperlipidemia: Her LDL is not at goal despite taking Lipitor 40 mg nightly.  -Ordering Zetia. - Checking a follow-up lipid panel in 6 weeks. 6. Transaminitis: Etiology is unknown. NAFLD? She is very negative for hepatitis B and C in the past per review of previous lab work. Her elevated alkaline phosphatase level is from her liver per her isoenzyme lab results.  -Ordering a FibroSure test and anti-smooth muscle antibody test.  - Ordering an ultrasound of her liver. 7. Sciatica: Persistent and unchanged.  -Activity as tolerated. - I encouraged her to go to her scheduled appointment with Orthopedics next wk    **Refilling her prevacid**      ICD-10-CM    1. Type 2 diabetes mellitus with chronic kidney disease, without long-term current use of insulin, unspecified CKD stage (HCC)  E11.22       2. Chronic kidney disease, stage 4 (severe) (HCC)  N18.4       3. Arthralgia, unspecified joint  M25.50       4. Acquired hypothyroidism  E03.9 External Referral To Endocrinology     levothyroxine (SYNTHROID) 125 MCG tablet     TSH + Free T4 Panel      5. Hyperlipidemia, unspecified hyperlipidemia type  E78.5 ezetimibe (ZETIA) 10 MG tablet      6. Transaminitis  R74.01 US ABDOMEN LIMITED     ARIAS FibroSURE     Anti-smooth muscle antibody     CANCELED: Anti-smooth muscle antibody     CANCELED: ARIAS FibroSURE      7. Rheumatoid factor positive  R76.8       8. High serum thyroid stimulating hormone (TSH)  R79.89 MRI BRAIN WO CONTRAST      9.  Nonintractable headache, unspecified chronicity pattern, unspecified headache type  R51.9 MRI BRAIN WO CONTRAST 10. Hypertension, unspecified type  I10       11. Chronic systolic congestive heart failure (HCC)  I50.22             Health Maintenance Due   Topic Date Due    Shingles vaccine (1 of 2) Never done    Cervical cancer screen  Never done    Diabetic retinal exam  12/19/2017    Diabetic foot exam  06/06/2020    COVID-19 Vaccine (4 - Booster for Pfizer series) 01/06/2022    Flu vaccine (1) 08/01/2022         Lab review: labs are reviewed in the EHR and ordered as mentioned above    I have discussed the diagnosis with the patient and the intended plan as seen in the above orders. The patient has received an after-visit summary and questions were answered concerning future plans. I have discussed medication side effects and warnings with the patient as well. I have reviewed the plan of care with the patient, accepted their input and they are in agreement with the treatment goals. All questions were answered. The patient understands the plan of care. Handouts provided today with above information. Pt instructed if symptoms worsen to call the office or report to the ED for continued care. Greater than 50% of the visit time was spent in counseling and/or coordination of care. Voice recognition was used to generate this report, which may have resulted in some phonetic based errors in grammar and contents. Even though attempts were made to correct all the mistakes, some may have been missed, and remained in the body of the document. No follow-up provider specified.     Caren Carrion MD

## 2023-02-22 ENCOUNTER — OFFICE VISIT (OUTPATIENT)
Age: 63
End: 2023-02-22
Payer: COMMERCIAL

## 2023-02-22 VITALS
HEART RATE: 76 BPM | RESPIRATION RATE: 18 BRPM | BODY MASS INDEX: 30.82 KG/M2 | WEIGHT: 185 LBS | OXYGEN SATURATION: 95 % | HEIGHT: 65 IN | SYSTOLIC BLOOD PRESSURE: 117 MMHG | TEMPERATURE: 97.8 F | DIASTOLIC BLOOD PRESSURE: 85 MMHG

## 2023-02-22 DIAGNOSIS — M54.41 CHRONIC BILATERAL LOW BACK PAIN WITH RIGHT-SIDED SCIATICA: Primary | ICD-10-CM

## 2023-02-22 DIAGNOSIS — G89.29 CHRONIC BILATERAL LOW BACK PAIN WITH RIGHT-SIDED SCIATICA: Primary | ICD-10-CM

## 2023-02-22 PROCEDURE — 3074F SYST BP LT 130 MM HG: CPT | Performed by: PHYSICAL MEDICINE & REHABILITATION

## 2023-02-22 PROCEDURE — 3079F DIAST BP 80-89 MM HG: CPT | Performed by: PHYSICAL MEDICINE & REHABILITATION

## 2023-02-22 PROCEDURE — 99204 OFFICE O/P NEW MOD 45 MIN: CPT | Performed by: PHYSICAL MEDICINE & REHABILITATION

## 2023-02-22 RX ORDER — METOCLOPRAMIDE 10 MG/1
TABLET ORAL
COMMUNITY
End: 2023-02-22

## 2023-02-22 RX ORDER — FUROSEMIDE 40 MG/1
TABLET ORAL
COMMUNITY
End: 2023-02-22

## 2023-02-22 RX ORDER — PANTOPRAZOLE SODIUM 20 MG/1
TABLET, DELAYED RELEASE ORAL
COMMUNITY

## 2023-02-22 RX ORDER — ATORVASTATIN CALCIUM 20 MG/1
TABLET, FILM COATED ORAL
COMMUNITY

## 2023-02-22 RX ORDER — CEFUROXIME AXETIL 250 MG/1
TABLET ORAL
COMMUNITY
End: 2023-02-22

## 2023-02-22 RX ORDER — LEVOTHYROXINE SODIUM 112 UG/1
TABLET ORAL
COMMUNITY
Start: 2021-01-19 | End: 2023-02-22

## 2023-02-22 RX ORDER — AMIODARONE HYDROCHLORIDE 200 MG/1
TABLET ORAL
COMMUNITY

## 2023-02-22 RX ORDER — LIDOCAINE 50 MG/G
1 PATCH TOPICAL DAILY
Qty: 30 PATCH | Refills: 3 | Status: SHIPPED | OUTPATIENT
Start: 2023-02-22 | End: 2023-03-24

## 2023-02-22 RX ORDER — ALLOPURINOL 100 MG/1
TABLET ORAL
COMMUNITY
Start: 2021-01-19 | End: 2023-02-22

## 2023-02-22 RX ORDER — LANSOPRAZOLE 30 MG/1
30 CAPSULE, DELAYED RELEASE ORAL
COMMUNITY
Start: 2020-11-16 | End: 2023-02-22

## 2023-02-22 RX ORDER — DULOXETIN HYDROCHLORIDE 60 MG/1
CAPSULE, DELAYED RELEASE ORAL
COMMUNITY
Start: 2021-01-22 | End: 2023-02-22

## 2023-02-22 RX ORDER — CALCITRIOL 0.25 UG/1
CAPSULE, LIQUID FILLED ORAL
COMMUNITY
Start: 2020-11-19

## 2023-02-22 RX ORDER — LISINOPRIL 2.5 MG/1
TABLET ORAL
COMMUNITY

## 2023-02-22 RX ORDER — NEOMYCIN SULFATE 500 MG/1
TABLET ORAL
COMMUNITY
End: 2023-02-22

## 2023-02-22 NOTE — PROGRESS NOTES
[facility]  [address], [address]  [place], [address] [place] [address] Street  Phone: [phone]  Fax: [phone]      Patient: Ibrahima Mueller                                                                              MRN: [id number]        YOB: 1960          AGE: 60 y.o. PCP: Tae Maldonado MD  Date:  02/22/23    Reason for Consultation: Back Pain      HPI:  Ibrahima Mueller is a 60 y.o. female with relevant PMH of     of right sided breast cancer s/p right mastectomy, colon cancer (2019) s/p surgery, thyroid ca b/l clear cell carcinoma s/p b/l partial nephrectomies, pulmonary nodule, chemotherapy induced CMO/CHF, osteopenia, CKD (followed by Lamont Baumgarten), type 2 DM who presents with low back pain radiating down the right leg laterally  towards the knee. She has a prior history of intermittent low back pain but after a long flight 10/2022 to [facility] she developed sharp constant pain in her low back down her right leg that has worsened over time. Neurologic symptoms: No numbness, tingling, weakness, bowel or bladder changes. No recent falls      Location: The pain is located in the low back    Radiation: The pain does radiate down the right leg laterally to the knee. Pain Score: Currently: 10/10    Quality: Pain is of a burning, stabbing, tingling quality. Aggravating: Pain is exacerbated by walking, sit to stand   Alleviating: The pain is alleviated by nothing    Prior Treatments:  Physical therapy: No  Injections:No  Surgery:No  Previous Medications:   Current Medications:tylenol 500mg two tablets, pain creams , tizanadine 2mg prn   Previous work-up has included:   X-ray lumbar spine 2/22/23  No acute osseous findings. Grade 1 anterolisthesis of L5 with respect S1. No subluxation on dynamic   imaging.      Past Medical History:   Past Medical History:   Diagnosis Date    [facility]     Cancer ([facility] 2000    Right breast ca     Colon cancer ([facility]     Congestive heart failure, unspecified     Gout     H/O total mastectomy of right breast     Heart disease     Hx: UTI (urinary tract infection)     Hypercholesterolemia     Iron deficiency anemia     Nausea & vomiting     Renal cell carcinoma of left kidney ([facility] 12/17/15    Renal mass, left     Thyroid cancer ([facility]     2017      Past Surgical History:   Past Surgical History:   Procedure Laterality Date    CHOLECYSTECTOMY      IR PORT PLACEMENT EQUAL OR GREATER THAN 5 YEARS  10/15/2019    IR PORT PLACEMENT EQUAL OR GREATER THAN 5 YEARS 10/15/2019 [facility] RAD ANGIO IR    IR PORT PLACEMENT EQUAL OR GREATER THAN 5 YEARS  10/15/2019    IR REMOVE TUNNELED VAD W PORT  3/5/2021    KIDNEY BIOPSY Right 12/17/15    Ct guided bx, Dr. Clem Montes, [address] [facility] Right 3/22/16    Partial x2, Dr. Rayray Brewer, [facility] Right     ORTHOPEDIC SURGERY  04/19/2017    Trigger finger release R hand    THYROIDECTOMY  6/20/2018      SocHx:   Social History     Tobacco Use    Smoking status: Never    Smokeless tobacco: Never   Substance Use Topics    Alcohol use: No      FamHx:? Family History   Problem Relation Age of Onset    Hypertension Father     Diabetes Father     Heart Failure Father     Heart Attack Father        Current Medications:   Current Outpatient Medications   Medication Sig Dispense Refill    atorvastatin (LIPITOR) 20 MG tablet Atorvastatin Oral    once daily    active      calcitRIOL (ROCALTROL) 0.25 MCG capsule Calcitriol Oral    1 PO daily     active      COVID-19 mRNA Vacc, PFIZER, 30 MCG/0.3ML SUSP injection Inject 0.3 mLs into the muscle      lisinopril (PRINIVIL;ZESTRIL) 2.5 MG tablet Lisinopril Oral    4 tabs po once daily    inactive      pantoprazole (PROTONIX) 20 MG tablet Take by mouth      lidocaine (LIDODERM) 5 % Place 1 patch onto the skin daily 12 hours on, 12 hours off.  30 patch 3    levothyroxine (SYNTHROID) 125 MCG tablet Take 1 tablet by mouth daily 90 tablet 1    ezetimibe (ZETIA) 10 MG tablet Take 1 tablet by mouth daily 30 tablet 3    lansoprazole (PREVACID) 30 MG delayed release capsule TAKE 1 CAPSULE BY MOUTH EVERY DAY 30 capsule 11    allopurinol (ZYLOPRIM) 100 MG tablet TAKE 1 TABLET BY MOUTH EVERY DAY      bumetanide (BUMEX) 1 MG tablet Take 1 mg by mouth daily      carvedilol (COREG) 25 MG tablet Take 25 mg by mouth 2 times daily      DULoxetine (CYMBALTA) 60 MG extended release capsule ceived the following from [facility]: Outside name: DULoxetine (CYMBALTA) 60 mg capsule      losartan (COZAAR) 25 MG tablet Take by mouth daily      rivaroxaban (XARELTO) 20 MG TABS tablet ceived the following from [facility]: Outside name: Xarelto 20 mg tab tablet      tiZANidine (ZANAFLEX) 2 MG tablet Take 2 mg by mouth daily as needed      amiodarone (CORDARONE) 200 MG tablet Amiodarone Oral    once daily    active      atorvastatin (LIPITOR) 40 MG tablet TAKE 1 TABLET BY MOUTH EVERY DAY (Patient not taking: Reported on 2/22/2023)      isosorbide mononitrate (IMDUR) 30 MG extended release tablet Take 30 mg by mouth daily       No current facility-administered medications for this visit. Allergies: Allergies   Allergen Reactions    Codeine Other (See Comments) and Swelling     Other reaction(s): Not Reported This Time  Throat Swelling    Penicillins Angioedema and Other (See Comments)     N/V, swelling    Tramadol Swelling    Sulfa Antibiotics Other (See Comments)     itching    Sulfur Hives        Review of Systems         Physical Exam     Vital Signs: /85 (Site: Right Upper Arm, Position: Sitting, Cuff Size: Large Adult)   Pulse 76   Temp 97.8 °F (36.6 °C) (Temporal)   Resp 18   Ht 5' 5\" (1.651 m)   Wt 185 lb (83.9 kg)   SpO2 95% Comment: RA  BMI 30.79 kg/m²    General: ???????  female Body mass index is 30.79 kg/m². without any acute distress   Psychiatric: ?  Alert and oriented x 3 with normal mood    HEENT: ????????   Atraumatic Respiratory:   Breathing non-labored and non dyspneic   CV: ???????????????? Peripheral pulses intact, no peripheral edema   Skin: ????????????? No rashes       Neurologic: ?? Sensation: normal and grossly intact thebilateral, lower extremity(s) decreased sensation bilateral feet   Strength: 5/5 in the bilateral, lower extremity(s)   Reflexes: reveals 2+ symmetric DTRs throughout   Gait: normal     Musculoskeletal: Lumbar Exam     Inspection:   Alignment: Normal  Atrophy: None       Tenderness to Palpation:   Lumbar paraspinals Positive  Lumbar spinous processes Negative  SI Joint:  Positive  Gluteal:Positive   Greater trochanter: Positive  IT Band:Negative    ROM:   Lumbar ROM: Abnormal very restricted ROM pain with flexion and extension  Lumbar facet loading: Positive  Hip ROM: No reproduction of pain with movement     Special Tests      Slump test: Positive right   SLR: Positive right   LISA: Positive low back pain  FADIR: Negative  Log Roll: Negative      Medical Decision Making:    Images: The imaging results as well as the actual images of the studies below were reviewed, visualized and interpreted by me. Labs: The results below were reviewed. X-ray lumbar spine 2/22/23  L5/S1 DDD     Assessment:   1. Chronic bilateral low back pain with right-sided sciatica  -     MRI LUMBAR SPINE WO CONTRAST; Future     Plan:      -Physical therapy -  gentle lumbar spine exercises provided   -Medications - consider low dose gabapentin 100mg- patient would like to avoid. Limited medications due to CKD stage 4  . Counseled regarding side effects and appropriate administration of medications.    -Diagnostics/Imaging - MRI lumbar spine given cancer history, chronic severe back pain   -Injections - NA   -Lifestyle - Discussed activities to continue and those to avoid    -Education - The patient's diagnosis, prognosis and treatment options were discussed today. All questions were answered.     F/U - after MRI  or sooner if needed.           [address] [facility] [address] [facility]

## 2023-02-22 NOTE — PROGRESS NOTES
Jose E Cantrell presents today for   Chief Complaint   Patient presents with    Back Pain       Is someone accompanying this pt? yes    Is the patient using any DME equipment during OV? no    Depression Screening:  No flowsheet data found. Learning Assessment:  No flowsheet data found. Abuse Screening:  No flowsheet data found. Fall Risk  No flowsheet data found. OPIOID RISK TOOL  No flowsheet data found. Coordination of Care:  1. Have you been to the ER, urgent care clinic since your last visit? no  Hospitalized since your last visit? no    2. Have you seen or consulted any other health care providers outside of the [address] [place] [address] since your last visit? on Include any pap smears or colon screening.  no

## 2023-04-21 ENCOUNTER — TELEPHONE (OUTPATIENT)
Age: 63
End: 2023-04-21

## 2023-04-21 ENCOUNTER — OFFICE VISIT (OUTPATIENT)
Age: 63
End: 2023-04-21
Payer: COMMERCIAL

## 2023-04-21 ENCOUNTER — HOSPITAL ENCOUNTER (OUTPATIENT)
Facility: HOSPITAL | Age: 63
Setting detail: SPECIMEN
End: 2023-04-21
Payer: COMMERCIAL

## 2023-04-21 VITALS
HEART RATE: 90 BPM | OXYGEN SATURATION: 94 % | HEIGHT: 66 IN | SYSTOLIC BLOOD PRESSURE: 128 MMHG | DIASTOLIC BLOOD PRESSURE: 86 MMHG | TEMPERATURE: 98.2 F | BODY MASS INDEX: 29.54 KG/M2 | WEIGHT: 183.8 LBS

## 2023-04-21 DIAGNOSIS — N30.00 ACUTE CYSTITIS WITHOUT HEMATURIA: Primary | ICD-10-CM

## 2023-04-21 DIAGNOSIS — E78.5 HYPERLIPIDEMIA, UNSPECIFIED HYPERLIPIDEMIA TYPE: ICD-10-CM

## 2023-04-21 DIAGNOSIS — R30.0 DYSURIA: ICD-10-CM

## 2023-04-21 DIAGNOSIS — R74.01 TRANSAMINITIS: ICD-10-CM

## 2023-04-21 DIAGNOSIS — M25.50 ARTHRALGIA, UNSPECIFIED JOINT: ICD-10-CM

## 2023-04-21 DIAGNOSIS — R51.9 NONINTRACTABLE HEADACHE, UNSPECIFIED CHRONICITY PATTERN, UNSPECIFIED HEADACHE TYPE: ICD-10-CM

## 2023-04-21 DIAGNOSIS — E03.9 ACQUIRED HYPOTHYROIDISM: ICD-10-CM

## 2023-04-21 LAB
BILIRUBIN, URINE, POC: NEGATIVE
BLOOD URINE, POC: ABNORMAL
GLUCOSE URINE, POC: ABNORMAL
KETONES, URINE, POC: NEGATIVE
LEUKOCYTE ESTERASE, URINE, POC: ABNORMAL
NITRITE, URINE, POC: POSITIVE
PH, URINE, POC: 5 (ref 4.6–8)
PROTEIN,URINE, POC: ABNORMAL
SPECIFIC GRAVITY, URINE, POC: 1.02 (ref 1–1.03)
URINALYSIS CLARITY, POC: ABNORMAL
URINALYSIS COLOR, POC: ABNORMAL
UROBILINOGEN, POC: ABNORMAL

## 2023-04-21 PROCEDURE — 87077 CULTURE AEROBIC IDENTIFY: CPT

## 2023-04-21 PROCEDURE — 3074F SYST BP LT 130 MM HG: CPT | Performed by: INTERNAL MEDICINE

## 2023-04-21 PROCEDURE — 3078F DIAST BP <80 MM HG: CPT | Performed by: INTERNAL MEDICINE

## 2023-04-21 PROCEDURE — 87186 SC STD MICRODIL/AGAR DIL: CPT

## 2023-04-21 PROCEDURE — 87086 URINE CULTURE/COLONY COUNT: CPT

## 2023-04-21 PROCEDURE — 99214 OFFICE O/P EST MOD 30 MIN: CPT | Performed by: INTERNAL MEDICINE

## 2023-04-21 PROCEDURE — 81003 URINALYSIS AUTO W/O SCOPE: CPT | Performed by: INTERNAL MEDICINE

## 2023-04-21 RX ORDER — RIVAROXABAN 15 MG/1
15 TABLET, FILM COATED ORAL DAILY
COMMUNITY
Start: 2023-04-01

## 2023-04-21 RX ORDER — CIPROFLOXACIN 500 MG/1
500 TABLET, FILM COATED ORAL DAILY
Qty: 5 TABLET | Refills: 0 | Status: SHIPPED | OUTPATIENT
Start: 2023-04-21 | End: 2023-04-26

## 2023-04-21 NOTE — TELEPHONE ENCOUNTER
Pt says she can be here by 12:45 to do the ua. I added her to your schedule right after. Let us know if this does not work for you since it is your lunch hour.

## 2023-04-21 NOTE — TELEPHONE ENCOUNTER
[facility]    Per Dr Michael Caldwell ask pt to come in soon (she will work her in for OV and urinalysis    f she can't come in per Dr Michael Caldwell she will meet with her in a virtual appt at [facility]

## 2023-04-21 NOTE — TELEPHONE ENCOUNTER
This time works for me. Placing orders.     Dr. [place] Cardona  [facility], [address] [place], [place] [address]s, [address]  Phone: [phone]  Fax: [phone]

## 2023-04-21 NOTE — TELEPHONE ENCOUNTER
Pt request medication for UTI sx since yesterday and worse today. Sx of urgency, frequency, burning, cloudy and has odor. Stated she has stage 4 kidney disease and she gets these frequently. She tried Azo yesterday, but it did not help    She is hopeful you will send something in early this am. Her daughter is getting  and she is heading today out of town for that. Pharmacy request for [facility] [employer]

## 2023-04-23 LAB
BACTERIA SPEC CULT: ABNORMAL
CC UR VC: ABNORMAL
SERVICE CMNT-IMP: ABNORMAL

## 2023-04-24 LAB
BACTERIA SPEC CULT: ABNORMAL
BACTERIA SPEC CULT: ABNORMAL
CC UR VC: ABNORMAL
SERVICE CMNT-IMP: ABNORMAL

## 2023-04-27 ASSESSMENT — ENCOUNTER SYMPTOMS
EYE PAIN: 0
ANAL BLEEDING: 0
SORE THROAT: 0
SHORTNESS OF BREATH: 0
ABDOMINAL PAIN: 0
COUGH: 0
BLOOD IN STOOL: 0

## 2023-05-30 DIAGNOSIS — E78.5 HYPERLIPIDEMIA, UNSPECIFIED HYPERLIPIDEMIA TYPE: ICD-10-CM

## 2023-05-30 DIAGNOSIS — E03.9 HYPOTHYROIDISM, UNSPECIFIED: ICD-10-CM

## 2023-05-31 RX ORDER — EZETIMIBE 10 MG/1
TABLET ORAL
Qty: 90 TABLET | Refills: 1 | Status: SHIPPED | OUTPATIENT
Start: 2023-05-31

## 2023-05-31 RX ORDER — LEVOTHYROXINE SODIUM 137 UG/1
TABLET ORAL
Qty: 90 TABLET | Refills: 1 | Status: SHIPPED | OUTPATIENT
Start: 2023-05-31

## 2023-05-31 NOTE — TELEPHONE ENCOUNTER
Please have her scheduled for a follow-up 40 min visit with me in August.    Dr. Xander Mueller  [facility], [address] [place] [address] [facility], [address] [place] [address] Str.  Phone: [phone]  Fax: [phone]

## 2023-05-31 NOTE — TELEPHONE ENCOUNTER
PCP: Kati Gonzalez MD    Last appt: [unfilled]  No future appointments.     Requested Prescriptions     Pending Prescriptions Disp Refills    ezetimibe (ZETIA) 10 MG tablet [Pharmacy Med Name: EZETIMIBE 10 MG TABLET] 90 tablet 1     Sig: TAKE 1 TABLET BY MOUTH EVERY DAY    levothyroxine (SYNTHROID) 137 MCG tablet [Pharmacy Med Name: LEVOTHYROXINE 137 MCG TABLET] 90 tablet 1     Sig: TAKE 1 TABLET BY MOUTH EVERY DAY BEFORE BREAKFAST

## 2023-08-08 ENCOUNTER — TELEPHONE (OUTPATIENT)
Age: 63
End: 2023-08-08

## 2023-08-08 DIAGNOSIS — E11.22 TYPE 2 DIABETES MELLITUS WITH CHRONIC KIDNEY DISEASE, WITHOUT LONG-TERM CURRENT USE OF INSULIN, UNSPECIFIED CKD STAGE (HCC): ICD-10-CM

## 2023-08-08 DIAGNOSIS — R76.8 RHEUMATOID FACTOR POSITIVE: ICD-10-CM

## 2023-08-08 DIAGNOSIS — E78.5 HYPERLIPIDEMIA, UNSPECIFIED HYPERLIPIDEMIA TYPE: ICD-10-CM

## 2023-08-08 DIAGNOSIS — N18.4 CHRONIC KIDNEY DISEASE, STAGE 4 (SEVERE) (HCC): ICD-10-CM

## 2023-08-08 DIAGNOSIS — E03.9 HYPOTHYROIDISM, UNSPECIFIED TYPE: Primary | ICD-10-CM

## 2023-08-08 DIAGNOSIS — M10.9 GOUT, UNSPECIFIED CAUSE, UNSPECIFIED CHRONICITY, UNSPECIFIED SITE: ICD-10-CM

## 2023-08-08 NOTE — TELEPHONE ENCOUNTER
----- Message from Mark Castano sent at 8/8/2023  9:40 AM EDT -----  Subject: Referral Request    Reason for referral request? Patient would like to get labs drawn ahead of   her next visit. I am not able to reach the . When can she come   in for this? Please advise. Provider patient wants to be referred to(if known):     Provider Phone Number(if known):     Additional Information for Provider?   ---------------------------------------------------------------------------  --------------  [address]    [phone]; OK to leave message on voicemail  ---------------------------------------------------------------------------  --------------

## 2023-08-09 DIAGNOSIS — E03.9 ACQUIRED HYPOTHYROIDISM: Primary | ICD-10-CM

## 2023-08-09 DIAGNOSIS — E11.22 TYPE 2 DIABETES MELLITUS WITH DIABETIC CHRONIC KIDNEY DISEASE, UNSPECIFIED CKD STAGE, UNSPECIFIED WHETHER LONG TERM INSULIN USE (HCC): ICD-10-CM

## 2023-08-09 DIAGNOSIS — E78.5 HYPERLIPIDEMIA, UNSPECIFIED HYPERLIPIDEMIA TYPE: ICD-10-CM

## 2023-08-09 DIAGNOSIS — E11.22 TYPE 2 DIABETES MELLITUS WITH CHRONIC KIDNEY DISEASE, WITHOUT LONG-TERM CURRENT USE OF INSULIN, UNSPECIFIED CKD STAGE (HCC): ICD-10-CM

## 2023-08-09 NOTE — TELEPHONE ENCOUNTER
Fasting labs ordered. Please let her know.     Dr. Megha Nicole  [facility] [address]  Phone: [phone]  Fax: [phone]

## 2023-08-21 ENCOUNTER — HOSPITAL ENCOUNTER (OUTPATIENT)
Facility: HOSPITAL | Age: 63
Discharge: HOME | End: 2023-08-24
Payer: COMMERCIAL

## 2023-08-21 DIAGNOSIS — N18.4 CHRONIC KIDNEY DISEASE, STAGE 4 (SEVERE) (HCC): ICD-10-CM

## 2023-08-21 DIAGNOSIS — E03.9 HYPOTHYROIDISM, UNSPECIFIED TYPE: ICD-10-CM

## 2023-08-21 DIAGNOSIS — R76.8 RHEUMATOID FACTOR POSITIVE: ICD-10-CM

## 2023-08-21 DIAGNOSIS — E11.22 TYPE 2 DIABETES MELLITUS WITH CHRONIC KIDNEY DISEASE, WITHOUT LONG-TERM CURRENT USE OF INSULIN, UNSPECIFIED CKD STAGE (HCC): ICD-10-CM

## 2023-08-21 DIAGNOSIS — M10.9 GOUT, UNSPECIFIED CAUSE, UNSPECIFIED CHRONICITY, UNSPECIFIED SITE: ICD-10-CM

## 2023-08-21 LAB
ALBUMIN SERPL-MCNC: 3.7 G/DL (ref 3.4–5)
ALBUMIN/GLOB SERPL: 1.3 (ref 0.8–1.7)
ALP SERPL-CCNC: 663 U/L (ref 45–117)
ALT SERPL-CCNC: 76 U/L (ref 13–56)
ANION GAP SERPL CALC-SCNC: 7 MMOL/L (ref 3–18)
AST SERPL-CCNC: 47 U/L (ref 10–38)
BASOPHILS # BLD: 0.1 K/UL (ref 0–0.1)
BASOPHILS NFR BLD: 2 % (ref 0–2)
BILIRUB SERPL-MCNC: 1 MG/DL (ref 0.2–1)
BUN SERPL-MCNC: 48 MG/DL (ref 7–18)
BUN/CREAT SERPL: 20 (ref 12–20)
CALCIUM SERPL-MCNC: 9.4 MG/DL (ref 8.5–10.1)
CHLORIDE SERPL-SCNC: 106 MMOL/L (ref 100–111)
CHOLEST SERPL-MCNC: 175 MG/DL
CO2 SERPL-SCNC: 25 MMOL/L (ref 21–32)
CREAT SERPL-MCNC: 2.42 MG/DL (ref 0.6–1.3)
CRP SERPL-MCNC: 0.9 MG/DL (ref 0–0.3)
DIFFERENTIAL METHOD BLD: NORMAL
EOSINOPHIL # BLD: 0.2 K/UL (ref 0–0.4)
EOSINOPHIL NFR BLD: 3 % (ref 0–5)
ERYTHROCYTE [DISTWIDTH] IN BLOOD BY AUTOMATED COUNT: 13.4 % (ref 11.6–14.5)
EST. AVERAGE GLUCOSE BLD GHB EST-MCNC: 169 MG/DL
FERRITIN SERPL-MCNC: 162 NG/ML (ref 8–388)
GLOBULIN SER CALC-MCNC: 2.9 G/DL (ref 2–4)
GLUCOSE SERPL-MCNC: 154 MG/DL (ref 74–99)
HBA1C MFR BLD: 7.5 % (ref 4.2–5.6)
HCT VFR BLD AUTO: 42.3 % (ref 35–45)
HDLC SERPL-MCNC: 26 MG/DL (ref 40–60)
HDLC SERPL: 6.7 (ref 0–5)
HGB BLD-MCNC: 13.4 G/DL (ref 12–16)
IMM GRANULOCYTES # BLD AUTO: 0 K/UL (ref 0–0.04)
IMM GRANULOCYTES NFR BLD AUTO: 0 % (ref 0–0.5)
IRON SATN MFR SERPL: 21 % (ref 20–50)
IRON SERPL-MCNC: 73 UG/DL (ref 50–175)
LDLC SERPL CALC-MCNC: 102.2 MG/DL (ref 0–100)
LIPID PANEL: ABNORMAL
LYMPHOCYTES # BLD: 1.3 K/UL (ref 0.9–3.6)
LYMPHOCYTES NFR BLD: 21 % (ref 21–52)
MCH RBC QN AUTO: 30.6 PG (ref 24–34)
MCHC RBC AUTO-ENTMCNC: 31.7 G/DL (ref 31–37)
MCV RBC AUTO: 96.6 FL (ref 78–100)
MONOCYTES # BLD: 0.4 K/UL (ref 0.05–1.2)
MONOCYTES NFR BLD: 6 % (ref 3–10)
NEUTS SEG # BLD: 4.3 K/UL (ref 1.8–8)
NEUTS SEG NFR BLD: 68 % (ref 40–73)
NRBC # BLD: 0 K/UL (ref 0–0.01)
NRBC BLD-RTO: 0 PER 100 WBC
PLATELET # BLD AUTO: 207 K/UL (ref 135–420)
PMV BLD AUTO: 10.5 FL (ref 9.2–11.8)
POTASSIUM SERPL-SCNC: 3.9 MMOL/L (ref 3.5–5.5)
PROT SERPL-MCNC: 6.6 G/DL (ref 6.4–8.2)
RBC # BLD AUTO: 4.38 M/UL (ref 4.2–5.3)
SODIUM SERPL-SCNC: 138 MMOL/L (ref 136–145)
T4 FREE SERPL-MCNC: 1.4 NG/DL (ref 0.7–1.5)
TIBC SERPL-MCNC: 352 UG/DL (ref 250–450)
TRIGL SERPL-MCNC: 234 MG/DL
TSH SERPL DL<=0.05 MIU/L-ACNC: 1.49 UIU/ML (ref 0.36–3.74)
URATE SERPL-MCNC: 8.1 MG/DL (ref 2.6–7.2)
VLDLC SERPL CALC-MCNC: 46.8 MG/DL
WBC # BLD AUTO: 6.4 K/UL (ref 4.6–13.2)

## 2023-08-21 PROCEDURE — 80053 COMPREHEN METABOLIC PANEL: CPT

## 2023-08-21 PROCEDURE — 86140 C-REACTIVE PROTEIN: CPT

## 2023-08-21 PROCEDURE — 85025 COMPLETE CBC W/AUTO DIFF WBC: CPT

## 2023-08-21 PROCEDURE — 84443 ASSAY THYROID STIM HORMONE: CPT

## 2023-08-21 PROCEDURE — 83550 IRON BINDING TEST: CPT

## 2023-08-21 PROCEDURE — 80061 LIPID PANEL: CPT

## 2023-08-21 PROCEDURE — 82728 ASSAY OF FERRITIN: CPT

## 2023-08-21 PROCEDURE — 83036 HEMOGLOBIN GLYCOSYLATED A1C: CPT

## 2023-08-21 PROCEDURE — 84550 ASSAY OF BLOOD/URIC ACID: CPT

## 2023-08-21 PROCEDURE — 83540 ASSAY OF IRON: CPT

## 2023-08-21 PROCEDURE — 36415 COLL VENOUS BLD VENIPUNCTURE: CPT

## 2023-08-21 PROCEDURE — 84439 ASSAY OF FREE THYROXINE: CPT

## 2023-08-23 ENCOUNTER — HOSPITAL ENCOUNTER (OUTPATIENT)
Facility: HOSPITAL | Age: 63
Discharge: HOME | End: 2023-08-26
Payer: COMMERCIAL

## 2023-08-23 DIAGNOSIS — I10 HYPERTENSION, UNSPECIFIED TYPE: ICD-10-CM

## 2023-08-23 DIAGNOSIS — N18.4 CHRONIC KIDNEY DISEASE (CKD) STAGE G4/A2, SEVERELY DECREASED GLOMERULAR FILTRATION RATE (GFR) BETWEEN 15-29 ML/MIN/1.73 SQUARE METER AND ALBUMINURIA CREATININE RATIO BETWEEN 30-299 MG/G (HCC): ICD-10-CM

## 2023-08-23 LAB
APPEARANCE UR: ABNORMAL
BACTERIA URNS QL MICRO: ABNORMAL /HPF
BASOPHILS # BLD: 0.1 K/UL (ref 0–0.1)
BASOPHILS NFR BLD: 2 % (ref 0–2)
BILIRUB UR QL: NEGATIVE
COLOR UR: YELLOW
DIFFERENTIAL METHOD BLD: NORMAL
EOSINOPHIL # BLD: 0.2 K/UL (ref 0–0.4)
EOSINOPHIL NFR BLD: 3 % (ref 0–5)
EPITH CASTS URNS QL MICRO: ABNORMAL /LPF (ref 0–5)
ERYTHROCYTE [DISTWIDTH] IN BLOOD BY AUTOMATED COUNT: 13.6 % (ref 11.6–14.5)
GLUCOSE UR STRIP.AUTO-MCNC: NEGATIVE MG/DL
HCT VFR BLD AUTO: 43.5 % (ref 35–45)
HGB BLD-MCNC: 14.1 G/DL (ref 12–16)
HGB UR QL STRIP: ABNORMAL
IMM GRANULOCYTES # BLD AUTO: 0 K/UL (ref 0–0.04)
IMM GRANULOCYTES NFR BLD AUTO: 0 % (ref 0–0.5)
KETONES UR QL STRIP.AUTO: NEGATIVE MG/DL
LEUKOCYTE ESTERASE UR QL STRIP.AUTO: ABNORMAL
LYMPHOCYTES # BLD: 1.9 K/UL (ref 0.9–3.6)
LYMPHOCYTES NFR BLD: 23 % (ref 21–52)
MCH RBC QN AUTO: 31.2 PG (ref 24–34)
MCHC RBC AUTO-ENTMCNC: 32.4 G/DL (ref 31–37)
MCV RBC AUTO: 96.2 FL (ref 78–100)
MONOCYTES # BLD: 0.5 K/UL (ref 0.05–1.2)
MONOCYTES NFR BLD: 5 % (ref 3–10)
NEUTS SEG # BLD: 5.9 K/UL (ref 1.8–8)
NEUTS SEG NFR BLD: 68 % (ref 40–73)
NITRITE UR QL STRIP.AUTO: POSITIVE
NRBC # BLD: 0 K/UL (ref 0–0.01)
NRBC BLD-RTO: 0 PER 100 WBC
PH UR STRIP: 5.5 (ref 5–8)
PLATELET # BLD AUTO: 241 K/UL (ref 135–420)
PMV BLD AUTO: 10.2 FL (ref 9.2–11.8)
PROT UR STRIP-MCNC: NEGATIVE MG/DL
RBC # BLD AUTO: 4.52 M/UL (ref 4.2–5.3)
RBC #/AREA URNS HPF: ABNORMAL /HPF (ref 0–5)
SP GR UR REFRACTOMETRY: 1.01 (ref 1–1.03)
UROBILINOGEN UR QL STRIP.AUTO: 0.2 EU/DL (ref 0.2–1)
WBC # BLD AUTO: 8.6 K/UL (ref 4.6–13.2)
WBC URNS QL MICRO: ABNORMAL /HPF (ref 0–4)

## 2023-08-23 PROCEDURE — 81001 URINALYSIS AUTO W/SCOPE: CPT

## 2023-08-23 PROCEDURE — 85025 COMPLETE CBC W/AUTO DIFF WBC: CPT

## 2023-08-23 PROCEDURE — 87086 URINE CULTURE/COLONY COUNT: CPT

## 2023-08-23 PROCEDURE — 87186 SC STD MICRODIL/AGAR DIL: CPT

## 2023-08-23 PROCEDURE — 36415 COLL VENOUS BLD VENIPUNCTURE: CPT

## 2023-08-23 PROCEDURE — 87077 CULTURE AEROBIC IDENTIFY: CPT

## 2023-08-26 LAB
BACTERIA SPEC CULT: ABNORMAL
CC UR VC: ABNORMAL
SERVICE CMNT-IMP: ABNORMAL

## 2023-09-12 NOTE — TELEPHONE ENCOUNTER
Please confirm the dose of synthroid she is taking.     Dr. Chantal Narvaez  Internists of [address] [place] [address]  Phone: [phone]  Fax: [phone]

## 2023-09-14 RX ORDER — LEVOTHYROXINE SODIUM 112 UG/1
TABLET ORAL
Qty: 48 TABLET | Refills: 5 | Status: SHIPPED | OUTPATIENT
Start: 2023-09-14

## 2023-09-15 RX ORDER — ATORVASTATIN CALCIUM 40 MG/1
40 TABLET, FILM COATED ORAL DAILY
Qty: 90 TABLET | Refills: 1 | Status: ON HOLD | OUTPATIENT
Start: 2023-09-15 | End: 2023-11-08

## 2023-09-19 ENCOUNTER — OFFICE VISIT (OUTPATIENT)
Age: 63
End: 2023-09-19
Payer: COMMERCIAL

## 2023-09-19 ENCOUNTER — HOSPITAL ENCOUNTER (OUTPATIENT)
Facility: HOSPITAL | Age: 63
Setting detail: SPECIMEN
Discharge: HOME | End: 2023-09-22
Payer: COMMERCIAL

## 2023-09-19 ENCOUNTER — TELEPHONE (OUTPATIENT)
Age: 63
End: 2023-09-19

## 2023-09-19 ENCOUNTER — NURSE ONLY (OUTPATIENT)
Age: 63
End: 2023-09-19

## 2023-09-19 VITALS
RESPIRATION RATE: 20 BRPM | TEMPERATURE: 98 F | BODY MASS INDEX: 29.41 KG/M2 | DIASTOLIC BLOOD PRESSURE: 82 MMHG | HEIGHT: 66 IN | OXYGEN SATURATION: 99 % | HEART RATE: 95 BPM | WEIGHT: 183 LBS | SYSTOLIC BLOOD PRESSURE: 135 MMHG

## 2023-09-19 DIAGNOSIS — R74.8 ALKALINE PHOSPHATASE ELEVATION: ICD-10-CM

## 2023-09-19 DIAGNOSIS — R06.83 SNORING: ICD-10-CM

## 2023-09-19 DIAGNOSIS — M10.9 GOUT, UNSPECIFIED CAUSE, UNSPECIFIED CHRONICITY, UNSPECIFIED SITE: ICD-10-CM

## 2023-09-19 DIAGNOSIS — E03.9 ACQUIRED HYPOTHYROIDISM: Primary | ICD-10-CM

## 2023-09-19 DIAGNOSIS — M10.9 ACUTE GOUT, UNSPECIFIED CAUSE, UNSPECIFIED SITE: Primary | ICD-10-CM

## 2023-09-19 DIAGNOSIS — N18.4 CHRONIC KIDNEY DISEASE, STAGE 4 (SEVERE) (HCC): Primary | ICD-10-CM

## 2023-09-19 DIAGNOSIS — E03.9 HYPOTHYROIDISM, UNSPECIFIED TYPE: ICD-10-CM

## 2023-09-19 LAB
CRP SERPL-MCNC: 2.1 MG/DL (ref 0–0.3)
URATE SERPL-MCNC: 6.5 MG/DL (ref 2.6–7.2)

## 2023-09-19 PROCEDURE — 99214 OFFICE O/P EST MOD 30 MIN: CPT | Performed by: INTERNAL MEDICINE

## 2023-09-19 PROCEDURE — 36415 COLL VENOUS BLD VENIPUNCTURE: CPT

## 2023-09-19 PROCEDURE — 3074F SYST BP LT 130 MM HG: CPT | Performed by: INTERNAL MEDICINE

## 2023-09-19 PROCEDURE — 3078F DIAST BP <80 MM HG: CPT | Performed by: INTERNAL MEDICINE

## 2023-09-19 PROCEDURE — 84080 ASSAY ALKALINE PHOSPHATASES: CPT

## 2023-09-19 PROCEDURE — 86140 C-REACTIVE PROTEIN: CPT

## 2023-09-19 PROCEDURE — 84075 ASSAY ALKALINE PHOSPHATASE: CPT

## 2023-09-19 PROCEDURE — 84550 ASSAY OF BLOOD/URIC ACID: CPT

## 2023-09-19 RX ORDER — LEVOTHYROXINE SODIUM 112 UG/1
112 TABLET ORAL DAILY
Qty: 90 TABLET | Refills: 2 | Status: SHIPPED | OUTPATIENT
Start: 2023-09-19

## 2023-09-19 RX ORDER — METHYLPREDNISOLONE 4 MG/1
TABLET ORAL
Qty: 1 KIT | Refills: 0 | Status: SHIPPED | OUTPATIENT
Start: 2023-09-19 | End: 2023-09-25

## 2023-09-19 SDOH — ECONOMIC STABILITY: INCOME INSECURITY: HOW HARD IS IT FOR YOU TO PAY FOR THE VERY BASICS LIKE FOOD, HOUSING, MEDICAL CARE, AND HEATING?: NOT HARD AT ALL

## 2023-09-19 SDOH — ECONOMIC STABILITY: FOOD INSECURITY: WITHIN THE PAST 12 MONTHS, THE FOOD YOU BOUGHT JUST DIDN'T LAST AND YOU DIDN'T HAVE MONEY TO GET MORE.: NEVER TRUE

## 2023-09-19 SDOH — ECONOMIC STABILITY: HOUSING INSECURITY
IN THE LAST 12 MONTHS, WAS THERE A TIME WHEN YOU DID NOT HAVE A STEADY PLACE TO SLEEP OR SLEPT IN A SHELTER (INCLUDING NOW)?: NO

## 2023-09-19 SDOH — ECONOMIC STABILITY: FOOD INSECURITY: WITHIN THE PAST 12 MONTHS, YOU WORRIED THAT YOUR FOOD WOULD RUN OUT BEFORE YOU GOT MONEY TO BUY MORE.: NEVER TRUE

## 2023-09-19 NOTE — PROGRESS NOTES
[facility][note 3]:  10/17/2023, MRN: [id number]      Van Avila is a 58 y.o. female and presents to clinic for Thyroid Problem, Cholesterol Problem, and Arthritis      Subjective: The patient is a 59-year-old female with history of right sided breast cancer s/p right mastectomy, colon cancer (2019) s/p surgery, b/l clear cell carcinoma s/p b/l partial nephrectomies, pulmonary nodule, chemotherapy induced CMO/CHF, osteopenia, CKD (followed by Jessika Brambila), type 2 DM, suspected GALLO, multinodular goiter treated with a thyroidectomy, Covid-19 infection,  pacemaker placement (2021), postoperative hypothyroidism, HLD, GERD, vitamin D deficiency, and HTN    1. Fatigue: Worsening. +CKD - followed by . She is taking Synthroid 112mcg daily. There was confusion with the pharmacy and the pt about her dose. She was given a rx for 137mcg daily. Upon completing this prescription, she went back to taking 112 mcg daily. Her TFTs are WNL per August labs. 2. Alkaline Phosphatase:  Her level continues to rise. She has not had an apt with her GI or oncology team recently. Her most recent lab work shows that her alkaline phosphatase level is: 663. She has a history of osteopenia. No history of liver disease. 3. Gout: 2 wks ago, she was given a course of prednisone x 5 days for a presumed gout attack. Pain improved but returned when she stopped the medrol dose pack. She ate one chicken liver just prior to sx onset. No ETOH. No shellfish. She has right foot and ankle pain. 4. Suspected GALLO: She has insomnia. She has not seen Sleep Medicine. +Snoring.      Patient Active Problem List    Diagnosis Date Noted    Gout 06/27/2020    Secondary hyperparathyroidism of renal origin ([address]) 06/27/2020    Anemia in chronic kidney disease 06/27/2020    Breast cancer, right ([address]) 06/27/2020    Vitamin D deficiency 11/23/2019    GERD (gastroesophageal reflux disease) 11/23/2019    HTN (hypertension) 11/23/2019    Acquired

## 2023-09-19 NOTE — TELEPHONE ENCOUNTER
Pt says she is having a gout flare up in her foot  She Is getting labs done today and asking if Dr could check her levels and prescribe her prednisone for it

## 2023-09-19 NOTE — PROGRESS NOTES
Nitesh Corrales presents today for   Chief Complaint   Patient presents with    Thyroid Problem    Gout       1. \"Have you been to the ER, urgent care clinic since your last visit? Hospitalized since your last visit? \" No     2. \"Have you seen or consulted any other health care providers outside of the [address] [place] [address] since your last visit? \" No      3. For patients aged 43-73: Has the patient had a colonoscopy / FIT/ Cologuard? Yes - no Care Gap present      If the patient is female:    4. For patients aged 43-66: Has the patient had a mammogram within the past 2 years? Yes - no Care Gap present      5. For patients aged 21-65: Has the patient had a pap smear?  Yes - no Care Gap present

## 2023-09-22 LAB
ALP BONE CFR SERPL: 34 % (ref 14–68)
ALP INTEST CFR SERPL: 0 % (ref 0–18)
ALP LIVER CFR SERPL: 66 % (ref 18–85)
ALP SERPL-CCNC: 598 IU/L (ref 44–121)

## 2023-09-26 ENCOUNTER — OFFICE VISIT (OUTPATIENT)
Age: 63
End: 2023-09-26
Payer: COMMERCIAL

## 2023-09-26 VITALS
HEIGHT: 66 IN | HEART RATE: 82 BPM | WEIGHT: 185.13 LBS | TEMPERATURE: 98.1 F | OXYGEN SATURATION: 95 % | RESPIRATION RATE: 18 BRPM | DIASTOLIC BLOOD PRESSURE: 76 MMHG | BODY MASS INDEX: 29.75 KG/M2 | SYSTOLIC BLOOD PRESSURE: 119 MMHG

## 2023-09-26 DIAGNOSIS — E11.22 TYPE 2 DIABETES MELLITUS WITH CHRONIC KIDNEY DISEASE, WITHOUT LONG-TERM CURRENT USE OF INSULIN, UNSPECIFIED CKD STAGE (HCC): ICD-10-CM

## 2023-09-26 DIAGNOSIS — R06.83 SNORING: ICD-10-CM

## 2023-09-26 DIAGNOSIS — E03.9 HYPOTHYROIDISM, UNSPECIFIED TYPE: ICD-10-CM

## 2023-09-26 DIAGNOSIS — M10.9 ACUTE GOUT, UNSPECIFIED CAUSE, UNSPECIFIED SITE: ICD-10-CM

## 2023-09-26 DIAGNOSIS — R74.8 ALKALINE PHOSPHATASE ELEVATION: Primary | ICD-10-CM

## 2023-09-26 PROCEDURE — 3078F DIAST BP <80 MM HG: CPT | Performed by: INTERNAL MEDICINE

## 2023-09-26 PROCEDURE — 99214 OFFICE O/P EST MOD 30 MIN: CPT | Performed by: INTERNAL MEDICINE

## 2023-09-26 PROCEDURE — 3074F SYST BP LT 130 MM HG: CPT | Performed by: INTERNAL MEDICINE

## 2023-09-26 PROCEDURE — 3051F HG A1C>EQUAL 7.0%<8.0%: CPT | Performed by: INTERNAL MEDICINE

## 2023-09-26 NOTE — PROGRESS NOTES
[facility] OF [facility]:  10/2/2023, MRN: [id number]      Libertad Hayes is a 58 y.o. female and presents to clinic for Thyroid Problem      Subjective: The patient is a 79-year-old female with history of right sided breast cancer s/p right mastectomy, colon cancer (2019) s/p surgery, b/l clear cell carcinoma s/p b/l partial nephrectomies, pulmonary nodule, chemotherapy induced CMO/CHF, osteopenia, CKD (followed by Martina Cota), type 2 DM, suspected GALLO, multinodular goiter treated with a thyroidectomy, Covid-19 infection,  pacemaker placement (2021), postoperative hypothyroidism, HLD, GERD, vitamin D deficiency, and HTN. 1. Gout: At her last appointment, I ordered a Medrol Dosepak for presumed gout flareup. Prior to her appointment she was given a 5-day course of prednisone by her specialty team for presumed gout. Symptoms began after eating chicken livers. Pain was mostly localized to her right foot and ankle. Today she reports: Her symptoms resolve by today's visit. September labs show that her CRP is elevated at 2.1. Her uric acid level is 6.5, down from 8.1 in August.    2.  Alkaline phosphatase level: Recent lab work shows that most of this level is coming from her liver. It is in the 600s range. She has not seen her GI team or oncology team in several months at least.  Isoenzyme studies from September show that her total alkaline phosphatase level is down to 598 from 663 in August.  34% of her 598 alkaline phosphatase level in September is coming from her bones. 56% is coming from her liver. None is coming from her intestinal tract. She is on atorvastatin 20 mg daily given her history of hyperlipidemia. She is also on Zetia 10 mg daily. 3. Suspected GALLO: She was referred to sleep medicine for suspected GALLO, history of snoring, and insomnia. Today she reports: she has yet to schedule an apt with Sleep Medicine.         Patient Active Problem List    Diagnosis Date Noted    Gout

## 2023-10-02 ASSESSMENT — ENCOUNTER SYMPTOMS
BLOOD IN STOOL: 0
ANAL BLEEDING: 0
EYE PAIN: 0
SHORTNESS OF BREATH: 0
COUGH: 0
ABDOMINAL PAIN: 0
SORE THROAT: 0

## 2023-10-11 ENCOUNTER — HOSPITAL ENCOUNTER (OUTPATIENT)
Facility: HOSPITAL | Age: 63
Discharge: HOME | End: 2023-10-14
Attending: INTERNAL MEDICINE
Payer: COMMERCIAL

## 2023-10-11 DIAGNOSIS — R74.8 ALKALINE PHOSPHATASE ELEVATION: ICD-10-CM

## 2023-10-11 PROCEDURE — 76705 ECHO EXAM OF ABDOMEN: CPT

## 2023-10-17 ASSESSMENT — ENCOUNTER SYMPTOMS
SORE THROAT: 0
BLOOD IN STOOL: 0
SHORTNESS OF BREATH: 0
EYE PAIN: 0
COUGH: 0
ANAL BLEEDING: 0
ABDOMINAL PAIN: 0

## 2023-11-07 ENCOUNTER — TELEPHONE (OUTPATIENT)
Age: 63
End: 2023-11-07

## 2023-11-07 DIAGNOSIS — E11.22 TYPE 2 DIABETES MELLITUS WITH CHRONIC KIDNEY DISEASE, WITHOUT LONG-TERM CURRENT USE OF INSULIN, UNSPECIFIED CKD STAGE (HCC): Primary | ICD-10-CM

## 2023-11-07 NOTE — TELEPHONE ENCOUNTER
Pt came into office , after seeing oncologist ,she said they told her\"see  Dr Amanda LEDBETTER because he needs to be on diabetes  medicine , pt dropped off lab results from Dr Freya Delcid office

## 2023-11-08 ENCOUNTER — HOSPITAL ENCOUNTER (INPATIENT)
Facility: HOSPITAL | Age: 63
LOS: 2 days | Discharge: HOME | End: 2023-11-10
Attending: EMERGENCY MEDICINE | Admitting: STUDENT IN AN ORGANIZED HEALTH CARE EDUCATION/TRAINING PROGRAM
Payer: COMMERCIAL

## 2023-11-08 DIAGNOSIS — E11.00 HYPEROSMOLAR NON-KETOTIC STATE IN PATIENT WITH TYPE 2 DIABETES MELLITUS (HCC): Primary | ICD-10-CM

## 2023-11-08 PROBLEM — I73.89 HHS (HYPOTHENAR HAMMER SYNDROME) (HCC): Status: ACTIVE | Noted: 2023-11-08

## 2023-11-08 LAB
ALBUMIN SERPL-MCNC: 3.6 G/DL (ref 3.4–5)
ALBUMIN/GLOB SERPL: 1.2 (ref 0.8–1.7)
ALP SERPL-CCNC: 595 U/L (ref 45–117)
ALT SERPL-CCNC: 59 U/L (ref 13–56)
ANION GAP SERPL CALC-SCNC: 11 MMOL/L (ref 3–18)
ANION GAP SERPL CALC-SCNC: 11 MMOL/L (ref 3–18)
ANION GAP SERPL CALC-SCNC: 8 MMOL/L (ref 3–18)
APPEARANCE UR: CLEAR
AST SERPL-CCNC: 36 U/L (ref 10–38)
BASOPHILS # BLD: 0.1 K/UL (ref 0–0.1)
BASOPHILS NFR BLD: 2 % (ref 0–2)
BILIRUB SERPL-MCNC: 1 MG/DL (ref 0.2–1)
BILIRUB UR QL: NEGATIVE
BUN SERPL-MCNC: 42 MG/DL (ref 7–18)
BUN SERPL-MCNC: 42 MG/DL (ref 7–18)
BUN SERPL-MCNC: 44 MG/DL (ref 7–18)
BUN/CREAT SERPL: 15 (ref 12–20)
BUN/CREAT SERPL: 16 (ref 12–20)
BUN/CREAT SERPL: 18 (ref 12–20)
CALCIUM SERPL-MCNC: 8.6 MG/DL (ref 8.5–10.1)
CALCIUM SERPL-MCNC: 8.9 MG/DL (ref 8.5–10.1)
CALCIUM SERPL-MCNC: 9.2 MG/DL (ref 8.5–10.1)
CHLORIDE SERPL-SCNC: 104 MMOL/L (ref 100–111)
CHLORIDE SERPL-SCNC: 89 MMOL/L (ref 100–111)
CHLORIDE SERPL-SCNC: 99 MMOL/L (ref 100–111)
CO2 SERPL-SCNC: 21 MMOL/L (ref 21–32)
CO2 SERPL-SCNC: 22 MMOL/L (ref 21–32)
CO2 SERPL-SCNC: 23 MMOL/L (ref 21–32)
COLOR UR: YELLOW
CREAT SERPL-MCNC: 2.33 MG/DL (ref 0.6–1.3)
CREAT SERPL-MCNC: 2.65 MG/DL (ref 0.6–1.3)
CREAT SERPL-MCNC: 2.86 MG/DL (ref 0.6–1.3)
DIFFERENTIAL METHOD BLD: ABNORMAL
EOSINOPHIL # BLD: 0.1 K/UL (ref 0–0.4)
EOSINOPHIL NFR BLD: 2 % (ref 0–5)
ERYTHROCYTE [DISTWIDTH] IN BLOOD BY AUTOMATED COUNT: 13.7 % (ref 11.6–14.5)
EST. AVERAGE GLUCOSE BLD GHB EST-MCNC: 329 MG/DL
GLOBULIN SER CALC-MCNC: 3.1 G/DL (ref 2–4)
GLUCOSE BLD STRIP.AUTO-MCNC: 257 MG/DL (ref 70–110)
GLUCOSE BLD STRIP.AUTO-MCNC: 331 MG/DL (ref 70–110)
GLUCOSE BLD STRIP.AUTO-MCNC: 356 MG/DL (ref 70–110)
GLUCOSE BLD STRIP.AUTO-MCNC: 373 MG/DL (ref 70–110)
GLUCOSE BLD STRIP.AUTO-MCNC: 495 MG/DL (ref 70–110)
GLUCOSE BLD STRIP.AUTO-MCNC: >600 MG/DL (ref 70–110)
GLUCOSE SERPL-MCNC: 1014 MG/DL (ref 74–99)
GLUCOSE SERPL-MCNC: 317 MG/DL (ref 74–99)
GLUCOSE SERPL-MCNC: 530 MG/DL (ref 74–99)
GLUCOSE UR STRIP.AUTO-MCNC: >1000 MG/DL
HBA1C MFR BLD: 13.1 % (ref 4.2–5.6)
HCT VFR BLD AUTO: 39 % (ref 35–45)
HGB BLD-MCNC: 13.6 G/DL (ref 12–16)
HGB UR QL STRIP: NEGATIVE
IMM GRANULOCYTES # BLD AUTO: 0 K/UL (ref 0–0.04)
IMM GRANULOCYTES NFR BLD AUTO: 1 % (ref 0–0.5)
KETONES UR QL STRIP.AUTO: NEGATIVE MG/DL
LEUKOCYTE ESTERASE UR QL STRIP.AUTO: NEGATIVE
LYMPHOCYTES # BLD: 1.5 K/UL (ref 0.9–3.6)
LYMPHOCYTES NFR BLD: 22 % (ref 21–52)
MAGNESIUM SERPL-MCNC: 1.6 MG/DL (ref 1.6–2.6)
MAGNESIUM SERPL-MCNC: 2 MG/DL (ref 1.6–2.6)
MAGNESIUM SERPL-MCNC: 2.4 MG/DL (ref 1.6–2.6)
MCH RBC QN AUTO: 31.7 PG (ref 24–34)
MCHC RBC AUTO-ENTMCNC: 34.9 G/DL (ref 31–37)
MCV RBC AUTO: 90.9 FL (ref 78–100)
MONOCYTES # BLD: 0.4 K/UL (ref 0.05–1.2)
MONOCYTES NFR BLD: 5 % (ref 3–10)
NEUTS SEG # BLD: 4.9 K/UL (ref 1.8–8)
NEUTS SEG NFR BLD: 69 % (ref 40–73)
NITRITE UR QL STRIP.AUTO: NEGATIVE
NRBC # BLD: 0 K/UL (ref 0–0.01)
NRBC BLD-RTO: 0 PER 100 WBC
PH UR STRIP: 5.5 (ref 5–8)
PHOSPHATE SERPL-MCNC: 3.6 MG/DL (ref 2.5–4.9)
PLATELET # BLD AUTO: 175 K/UL (ref 135–420)
PMV BLD AUTO: 11.1 FL (ref 9.2–11.8)
POTASSIUM SERPL-SCNC: 3.2 MMOL/L (ref 3.5–5.5)
POTASSIUM SERPL-SCNC: 3.4 MMOL/L (ref 3.5–5.5)
POTASSIUM SERPL-SCNC: 3.8 MMOL/L (ref 3.5–5.5)
PROT SERPL-MCNC: 6.7 G/DL (ref 6.4–8.2)
PROT UR STRIP-MCNC: NEGATIVE MG/DL
RBC # BLD AUTO: 4.29 M/UL (ref 4.2–5.3)
SODIUM SERPL-SCNC: 123 MMOL/L (ref 136–145)
SODIUM SERPL-SCNC: 131 MMOL/L (ref 136–145)
SODIUM SERPL-SCNC: 134 MMOL/L (ref 136–145)
SP GR UR REFRACTOMETRY: 1.02 (ref 1–1.03)
UROBILINOGEN UR QL STRIP.AUTO: 0.2 EU/DL (ref 0.2–1)
WBC # BLD AUTO: 7.1 K/UL (ref 4.6–13.2)

## 2023-11-08 PROCEDURE — 6370000000 HC RX 637 (ALT 250 FOR IP): Performed by: HOSPITALIST

## 2023-11-08 PROCEDURE — 85025 COMPLETE CBC W/AUTO DIFF WBC: CPT

## 2023-11-08 PROCEDURE — 6360000002 HC RX W HCPCS

## 2023-11-08 PROCEDURE — 82962 GLUCOSE BLOOD TEST: CPT

## 2023-11-08 PROCEDURE — 99223 1ST HOSP IP/OBS HIGH 75: CPT | Performed by: STUDENT IN AN ORGANIZED HEALTH CARE EDUCATION/TRAINING PROGRAM

## 2023-11-08 PROCEDURE — 2140000001 HC CVICU INTERMEDIATE R&B

## 2023-11-08 PROCEDURE — 83036 HEMOGLOBIN GLYCOSYLATED A1C: CPT

## 2023-11-08 PROCEDURE — 6370000000 HC RX 637 (ALT 250 FOR IP): Performed by: STUDENT IN AN ORGANIZED HEALTH CARE EDUCATION/TRAINING PROGRAM

## 2023-11-08 PROCEDURE — 83735 ASSAY OF MAGNESIUM: CPT

## 2023-11-08 PROCEDURE — 99285 EMERGENCY DEPT VISIT HI MDM: CPT

## 2023-11-08 PROCEDURE — 2580000003 HC RX 258: Performed by: STUDENT IN AN ORGANIZED HEALTH CARE EDUCATION/TRAINING PROGRAM

## 2023-11-08 PROCEDURE — 6360000002 HC RX W HCPCS: Performed by: EMERGENCY MEDICINE

## 2023-11-08 PROCEDURE — 80053 COMPREHEN METABOLIC PANEL: CPT

## 2023-11-08 PROCEDURE — 2580000003 HC RX 258: Performed by: EMERGENCY MEDICINE

## 2023-11-08 PROCEDURE — 81003 URINALYSIS AUTO W/O SCOPE: CPT

## 2023-11-08 PROCEDURE — 36415 COLL VENOUS BLD VENIPUNCTURE: CPT

## 2023-11-08 PROCEDURE — 84100 ASSAY OF PHOSPHORUS: CPT

## 2023-11-08 PROCEDURE — 80048 BASIC METABOLIC PNL TOTAL CA: CPT

## 2023-11-08 RX ORDER — SODIUM CHLORIDE 9 MG/ML
INJECTION, SOLUTION INTRAVENOUS PRN
Status: DISCONTINUED | OUTPATIENT
Start: 2023-11-08 | End: 2023-11-10 | Stop reason: HOSPADM

## 2023-11-08 RX ORDER — ACETAMINOPHEN 650 MG/1
650 SUPPOSITORY RECTAL EVERY 6 HOURS PRN
Status: DISCONTINUED | OUTPATIENT
Start: 2023-11-08 | End: 2023-11-10 | Stop reason: HOSPADM

## 2023-11-08 RX ORDER — LANOLIN ALCOHOL/MO/W.PET/CERES
3 CREAM (GRAM) TOPICAL NIGHTLY PRN
Status: DISCONTINUED | OUTPATIENT
Start: 2023-11-08 | End: 2023-11-10 | Stop reason: HOSPADM

## 2023-11-08 RX ORDER — SODIUM CHLORIDE 0.9 % (FLUSH) 0.9 %
5-40 SYRINGE (ML) INJECTION PRN
Status: DISCONTINUED | OUTPATIENT
Start: 2023-11-08 | End: 2023-11-10 | Stop reason: HOSPADM

## 2023-11-08 RX ORDER — CARVEDILOL 25 MG/1
25 TABLET ORAL 2 TIMES DAILY
Status: DISCONTINUED | OUTPATIENT
Start: 2023-11-08 | End: 2023-11-10 | Stop reason: HOSPADM

## 2023-11-08 RX ORDER — ISOSORBIDE MONONITRATE 30 MG/1
30 TABLET, EXTENDED RELEASE ORAL DAILY
Status: DISCONTINUED | OUTPATIENT
Start: 2023-11-09 | End: 2023-11-10 | Stop reason: HOSPADM

## 2023-11-08 RX ORDER — SODIUM CHLORIDE, SODIUM LACTATE, POTASSIUM CHLORIDE, CALCIUM CHLORIDE 600; 310; 30; 20 MG/100ML; MG/100ML; MG/100ML; MG/100ML
INJECTION, SOLUTION INTRAVENOUS CONTINUOUS
Status: DISCONTINUED | OUTPATIENT
Start: 2023-11-08 | End: 2023-11-09

## 2023-11-08 RX ORDER — POLYETHYLENE GLYCOL 3350 17 G/17G
17 POWDER, FOR SOLUTION ORAL DAILY PRN
Status: DISCONTINUED | OUTPATIENT
Start: 2023-11-08 | End: 2023-11-10 | Stop reason: HOSPADM

## 2023-11-08 RX ORDER — MAGNESIUM SULFATE IN WATER 40 MG/ML
2000 INJECTION, SOLUTION INTRAVENOUS PRN
Status: DISCONTINUED | OUTPATIENT
Start: 2023-11-08 | End: 2023-11-10 | Stop reason: HOSPADM

## 2023-11-08 RX ORDER — BUMETANIDE 1 MG/1
1 TABLET ORAL DAILY
Status: DISCONTINUED | OUTPATIENT
Start: 2023-11-09 | End: 2023-11-10 | Stop reason: HOSPADM

## 2023-11-08 RX ORDER — ONDANSETRON 2 MG/ML
4 INJECTION INTRAMUSCULAR; INTRAVENOUS
Status: COMPLETED | OUTPATIENT
Start: 2023-11-08 | End: 2023-11-08

## 2023-11-08 RX ORDER — ALLOPURINOL 100 MG/1
100 TABLET ORAL DAILY
Status: DISCONTINUED | OUTPATIENT
Start: 2023-11-09 | End: 2023-11-10 | Stop reason: HOSPADM

## 2023-11-08 RX ORDER — 0.9 % SODIUM CHLORIDE 0.9 %
15 INTRAVENOUS SOLUTION INTRAVENOUS ONCE
Status: COMPLETED | OUTPATIENT
Start: 2023-11-08 | End: 2023-11-08

## 2023-11-08 RX ORDER — ONDANSETRON 2 MG/ML
INJECTION INTRAMUSCULAR; INTRAVENOUS
Status: COMPLETED
Start: 2023-11-08 | End: 2023-11-08

## 2023-11-08 RX ORDER — SODIUM CHLORIDE 0.9 % (FLUSH) 0.9 %
5-40 SYRINGE (ML) INJECTION EVERY 12 HOURS SCHEDULED
Status: DISCONTINUED | OUTPATIENT
Start: 2023-11-08 | End: 2023-11-10 | Stop reason: HOSPADM

## 2023-11-08 RX ORDER — PANTOPRAZOLE SODIUM 40 MG/1
40 TABLET, DELAYED RELEASE ORAL
Status: DISCONTINUED | OUTPATIENT
Start: 2023-11-09 | End: 2023-11-10 | Stop reason: HOSPADM

## 2023-11-08 RX ORDER — POTASSIUM CHLORIDE 20 MEQ/1
40 TABLET, EXTENDED RELEASE ORAL PRN
Status: DISCONTINUED | OUTPATIENT
Start: 2023-11-08 | End: 2023-11-10 | Stop reason: HOSPADM

## 2023-11-08 RX ORDER — FUROSEMIDE 40 MG/1
TABLET ORAL
COMMUNITY

## 2023-11-08 RX ORDER — ENOXAPARIN SODIUM 100 MG/ML
30 INJECTION SUBCUTANEOUS DAILY
Status: DISCONTINUED | OUTPATIENT
Start: 2023-11-09 | End: 2023-11-09 | Stop reason: ALTCHOICE

## 2023-11-08 RX ORDER — ONDANSETRON 2 MG/ML
4 INJECTION INTRAMUSCULAR; INTRAVENOUS EVERY 6 HOURS PRN
Status: DISCONTINUED | OUTPATIENT
Start: 2023-11-08 | End: 2023-11-10 | Stop reason: HOSPADM

## 2023-11-08 RX ORDER — ONDANSETRON 4 MG/1
4 TABLET, ORALLY DISINTEGRATING ORAL EVERY 8 HOURS PRN
Status: DISCONTINUED | OUTPATIENT
Start: 2023-11-08 | End: 2023-11-10 | Stop reason: HOSPADM

## 2023-11-08 RX ORDER — ACETAMINOPHEN 325 MG/1
650 TABLET ORAL EVERY 6 HOURS PRN
Status: DISCONTINUED | OUTPATIENT
Start: 2023-11-08 | End: 2023-11-10 | Stop reason: HOSPADM

## 2023-11-08 RX ORDER — POTASSIUM CHLORIDE 7.45 MG/ML
10 INJECTION INTRAVENOUS PRN
Status: DISCONTINUED | OUTPATIENT
Start: 2023-11-08 | End: 2023-11-10 | Stop reason: HOSPADM

## 2023-11-08 RX ORDER — CALCITRIOL 0.25 UG/1
0.25 CAPSULE, LIQUID FILLED ORAL DAILY
Status: DISCONTINUED | OUTPATIENT
Start: 2023-11-09 | End: 2023-11-10 | Stop reason: HOSPADM

## 2023-11-08 RX ORDER — DEXTROSE MONOHYDRATE, SODIUM CHLORIDE, AND POTASSIUM CHLORIDE 50; 1.49; 4.5 G/1000ML; G/1000ML; G/1000ML
INJECTION, SOLUTION INTRAVENOUS CONTINUOUS PRN
Status: DISCONTINUED | OUTPATIENT
Start: 2023-11-08 | End: 2023-11-09

## 2023-11-08 RX ORDER — SODIUM CHLORIDE 9 MG/ML
INJECTION, SOLUTION INTRAVENOUS CONTINUOUS
Status: DISCONTINUED | OUTPATIENT
Start: 2023-11-08 | End: 2023-11-08

## 2023-11-08 RX ORDER — LOSARTAN POTASSIUM 25 MG/1
25 TABLET ORAL DAILY
Status: DISCONTINUED | OUTPATIENT
Start: 2023-11-09 | End: 2023-11-10 | Stop reason: HOSPADM

## 2023-11-08 RX ORDER — DULOXETIN HYDROCHLORIDE 60 MG/1
60 CAPSULE, DELAYED RELEASE ORAL DAILY
Status: DISCONTINUED | OUTPATIENT
Start: 2023-11-09 | End: 2023-11-10 | Stop reason: HOSPADM

## 2023-11-08 RX ADMIN — SODIUM CHLORIDE, PRESERVATIVE FREE 10 ML: 5 INJECTION INTRAVENOUS at 23:31

## 2023-11-08 RX ADMIN — ONDANSETRON 4 MG: 2 INJECTION INTRAMUSCULAR; INTRAVENOUS at 19:50

## 2023-11-08 RX ADMIN — SODIUM CHLORIDE, POTASSIUM CHLORIDE, SODIUM LACTATE AND CALCIUM CHLORIDE: 600; 310; 30; 20 INJECTION, SOLUTION INTRAVENOUS at 23:27

## 2023-11-08 RX ADMIN — CARVEDILOL 25 MG: 25 TABLET, FILM COATED ORAL at 23:26

## 2023-11-08 RX ADMIN — INSULIN HUMAN 10.82 UNITS/HR: 1 INJECTION, SOLUTION INTRAVENOUS at 18:22

## 2023-11-08 RX ADMIN — POTASSIUM CHLORIDE 10 MEQ: 7.46 INJECTION, SOLUTION INTRAVENOUS at 17:49

## 2023-11-08 RX ADMIN — POTASSIUM CHLORIDE 10 MEQ: 7.46 INJECTION, SOLUTION INTRAVENOUS at 19:17

## 2023-11-08 RX ADMIN — SODIUM CHLORIDE 1170 ML: 9 INJECTION, SOLUTION INTRAVENOUS at 17:47

## 2023-11-08 RX ADMIN — MAGNESIUM SULFATE HEPTAHYDRATE 2000 MG: 40 INJECTION, SOLUTION INTRAVENOUS at 19:16

## 2023-11-08 ASSESSMENT — PAIN - FUNCTIONAL ASSESSMENT: PAIN_FUNCTIONAL_ASSESSMENT: NONE - DENIES PAIN

## 2023-11-08 NOTE — ED TRIAGE NOTES
Pt states called today by Dr. Siddhartha Dc to come to ED for Glucose over 700. Reports recent sx's such as tired and extreme thirst but PCP \"has been monitoring me and trying to control it with diet\".

## 2023-11-08 NOTE — ED NOTES
Possible 18 beat run of V-tach seen on monitor. Was at bed side and Pt denies any Sx including palpitations. Showed Dr. Balderrama [place] monitor strip. Will continue to monitor per his order. No other orders given.       Nam Amador RN  11/08/23 8557

## 2023-11-08 NOTE — TELEPHONE ENCOUNTER
I spoke with her today. She is in the emergency department. Her blood sugar is greater than 600. There have been no new medications. She has not changed her diet. Her A1c was 7.5 and we checked it in August.  She also reports that she has not had any steroid prescriptions that would explain her hyperglycemia. She states that she had a cough/cold a few weeks ago but that her symptoms are improving.     Dr. Katy Dodge  Internists of [address] [place] [address]  Phone: [phone]  Fax: [phone]

## 2023-11-09 LAB
ALBUMIN SERPL-MCNC: 3.2 G/DL (ref 3.4–5)
ALBUMIN/GLOB SERPL: 1.2 (ref 0.8–1.7)
ALP SERPL-CCNC: 485 U/L (ref 45–117)
ALT SERPL-CCNC: 46 U/L (ref 13–56)
ANION GAP SERPL CALC-SCNC: 8 MMOL/L (ref 3–18)
AST SERPL-CCNC: 36 U/L (ref 10–38)
BASOPHILS # BLD: 0.1 K/UL (ref 0–0.1)
BASOPHILS NFR BLD: 1 % (ref 0–2)
BILIRUB SERPL-MCNC: 0.6 MG/DL (ref 0.2–1)
BUN SERPL-MCNC: 31 MG/DL (ref 7–18)
BUN SERPL-MCNC: 33 MG/DL (ref 7–18)
BUN SERPL-MCNC: 35 MG/DL (ref 7–18)
BUN/CREAT SERPL: 13 (ref 12–20)
BUN/CREAT SERPL: 15 (ref 12–20)
BUN/CREAT SERPL: 16 (ref 12–20)
CALCIUM SERPL-MCNC: 8.6 MG/DL (ref 8.5–10.1)
CALCIUM SERPL-MCNC: 8.7 MG/DL (ref 8.5–10.1)
CALCIUM SERPL-MCNC: 8.9 MG/DL (ref 8.5–10.1)
CHLORIDE SERPL-SCNC: 103 MMOL/L (ref 100–111)
CHLORIDE SERPL-SCNC: 105 MMOL/L (ref 100–111)
CHLORIDE SERPL-SCNC: 106 MMOL/L (ref 100–111)
CO2 SERPL-SCNC: 21 MMOL/L (ref 21–32)
CO2 SERPL-SCNC: 21 MMOL/L (ref 21–32)
CO2 SERPL-SCNC: 22 MMOL/L (ref 21–32)
CREAT SERPL-MCNC: 2.15 MG/DL (ref 0.6–1.3)
CREAT SERPL-MCNC: 2.23 MG/DL (ref 0.6–1.3)
CREAT SERPL-MCNC: 2.37 MG/DL (ref 0.6–1.3)
DIFFERENTIAL METHOD BLD: ABNORMAL
EOSINOPHIL # BLD: 0.2 K/UL (ref 0–0.4)
EOSINOPHIL NFR BLD: 3 % (ref 0–5)
ERYTHROCYTE [DISTWIDTH] IN BLOOD BY AUTOMATED COUNT: 14 % (ref 11.6–14.5)
GLOBULIN SER CALC-MCNC: 2.6 G/DL (ref 2–4)
GLUCOSE BLD STRIP.AUTO-MCNC: 171 MG/DL (ref 70–110)
GLUCOSE BLD STRIP.AUTO-MCNC: 195 MG/DL (ref 70–110)
GLUCOSE BLD STRIP.AUTO-MCNC: 203 MG/DL (ref 70–110)
GLUCOSE BLD STRIP.AUTO-MCNC: 204 MG/DL (ref 70–110)
GLUCOSE BLD STRIP.AUTO-MCNC: 219 MG/DL (ref 70–110)
GLUCOSE BLD STRIP.AUTO-MCNC: 225 MG/DL (ref 70–110)
GLUCOSE BLD STRIP.AUTO-MCNC: 238 MG/DL (ref 70–110)
GLUCOSE BLD STRIP.AUTO-MCNC: 295 MG/DL (ref 70–110)
GLUCOSE BLD STRIP.AUTO-MCNC: 393 MG/DL (ref 70–110)
GLUCOSE BLD STRIP.AUTO-MCNC: 416 MG/DL (ref 70–110)
GLUCOSE BLD STRIP.AUTO-MCNC: 426 MG/DL (ref 70–110)
GLUCOSE BLD STRIP.AUTO-MCNC: 433 MG/DL (ref 70–110)
GLUCOSE SERPL-MCNC: 197 MG/DL (ref 74–99)
GLUCOSE SERPL-MCNC: 427 MG/DL (ref 74–99)
GLUCOSE SERPL-MCNC: 496 MG/DL (ref 74–99)
HCT VFR BLD AUTO: 36.6 % (ref 35–45)
HGB BLD-MCNC: 12.7 G/DL (ref 12–16)
IMM GRANULOCYTES # BLD AUTO: 0 K/UL (ref 0–0.04)
IMM GRANULOCYTES NFR BLD AUTO: 0 % (ref 0–0.5)
LYMPHOCYTES # BLD: 1.9 K/UL (ref 0.9–3.6)
LYMPHOCYTES NFR BLD: 23 % (ref 21–52)
MAGNESIUM SERPL-MCNC: 2.1 MG/DL (ref 1.6–2.6)
MAGNESIUM SERPL-MCNC: 2.1 MG/DL (ref 1.6–2.6)
MAGNESIUM SERPL-MCNC: 2.2 MG/DL (ref 1.6–2.6)
MCH RBC QN AUTO: 31 PG (ref 24–34)
MCHC RBC AUTO-ENTMCNC: 34.7 G/DL (ref 31–37)
MCV RBC AUTO: 89.3 FL (ref 78–100)
MONOCYTES # BLD: 0.5 K/UL (ref 0.05–1.2)
MONOCYTES NFR BLD: 6 % (ref 3–10)
NEUTS SEG # BLD: 5.5 K/UL (ref 1.8–8)
NEUTS SEG NFR BLD: 67 % (ref 40–73)
NRBC # BLD: 0 K/UL (ref 0–0.01)
NRBC BLD-RTO: 0 PER 100 WBC
PHOSPHATE SERPL-MCNC: 2.3 MG/DL (ref 2.5–4.9)
PHOSPHATE SERPL-MCNC: 2.5 MG/DL (ref 2.5–4.9)
PHOSPHATE SERPL-MCNC: 2.7 MG/DL (ref 2.5–4.9)
PLATELET # BLD AUTO: 169 K/UL (ref 135–420)
PMV BLD AUTO: 10.8 FL (ref 9.2–11.8)
POTASSIUM SERPL-SCNC: 3.3 MMOL/L (ref 3.5–5.5)
POTASSIUM SERPL-SCNC: 3.7 MMOL/L (ref 3.5–5.5)
POTASSIUM SERPL-SCNC: 3.7 MMOL/L (ref 3.5–5.5)
PROT SERPL-MCNC: 5.8 G/DL (ref 6.4–8.2)
RBC # BLD AUTO: 4.1 M/UL (ref 4.2–5.3)
SODIUM SERPL-SCNC: 132 MMOL/L (ref 136–145)
SODIUM SERPL-SCNC: 134 MMOL/L (ref 136–145)
SODIUM SERPL-SCNC: 136 MMOL/L (ref 136–145)
WBC # BLD AUTO: 8.2 K/UL (ref 4.6–13.2)

## 2023-11-09 PROCEDURE — 80053 COMPREHEN METABOLIC PANEL: CPT

## 2023-11-09 PROCEDURE — 80048 BASIC METABOLIC PNL TOTAL CA: CPT

## 2023-11-09 PROCEDURE — 99232 SBSQ HOSP IP/OBS MODERATE 35: CPT | Performed by: INTERNAL MEDICINE

## 2023-11-09 PROCEDURE — 6370000000 HC RX 637 (ALT 250 FOR IP): Performed by: STUDENT IN AN ORGANIZED HEALTH CARE EDUCATION/TRAINING PROGRAM

## 2023-11-09 PROCEDURE — 6370000000 HC RX 637 (ALT 250 FOR IP): Performed by: INTERNAL MEDICINE

## 2023-11-09 PROCEDURE — 85025 COMPLETE CBC W/AUTO DIFF WBC: CPT

## 2023-11-09 PROCEDURE — 84100 ASSAY OF PHOSPHORUS: CPT

## 2023-11-09 PROCEDURE — 36415 COLL VENOUS BLD VENIPUNCTURE: CPT

## 2023-11-09 PROCEDURE — 6360000002 HC RX W HCPCS: Performed by: STUDENT IN AN ORGANIZED HEALTH CARE EDUCATION/TRAINING PROGRAM

## 2023-11-09 PROCEDURE — 83735 ASSAY OF MAGNESIUM: CPT

## 2023-11-09 PROCEDURE — 94761 N-INVAS EAR/PLS OXIMETRY MLT: CPT

## 2023-11-09 PROCEDURE — 2140000001 HC CVICU INTERMEDIATE R&B

## 2023-11-09 PROCEDURE — 2580000003 HC RX 258: Performed by: STUDENT IN AN ORGANIZED HEALTH CARE EDUCATION/TRAINING PROGRAM

## 2023-11-09 PROCEDURE — 82962 GLUCOSE BLOOD TEST: CPT

## 2023-11-09 RX ORDER — INSULIN LISPRO 100 [IU]/ML
0-8 INJECTION, SOLUTION INTRAVENOUS; SUBCUTANEOUS
Status: DISCONTINUED | OUTPATIENT
Start: 2023-11-09 | End: 2023-11-10 | Stop reason: HOSPADM

## 2023-11-09 RX ORDER — INSULIN GLARGINE 100 [IU]/ML
25 INJECTION, SOLUTION SUBCUTANEOUS DAILY
Status: DISCONTINUED | OUTPATIENT
Start: 2023-11-10 | End: 2023-11-09

## 2023-11-09 RX ORDER — INSULIN GLARGINE 100 [IU]/ML
10 INJECTION, SOLUTION SUBCUTANEOUS NIGHTLY
Status: DISCONTINUED | OUTPATIENT
Start: 2023-11-09 | End: 2023-11-09

## 2023-11-09 RX ORDER — DEXTROSE, SODIUM CHLORIDE, SODIUM LACTATE, POTASSIUM CHLORIDE, AND CALCIUM CHLORIDE 5; .6; .31; .03; .02 G/100ML; G/100ML; G/100ML; G/100ML; G/100ML
INJECTION, SOLUTION INTRAVENOUS CONTINUOUS
Status: DISCONTINUED | OUTPATIENT
Start: 2023-11-09 | End: 2023-11-09

## 2023-11-09 RX ORDER — DEXTROSE MONOHYDRATE 100 MG/ML
INJECTION, SOLUTION INTRAVENOUS CONTINUOUS PRN
Status: DISCONTINUED | OUTPATIENT
Start: 2023-11-09 | End: 2023-11-10 | Stop reason: HOSPADM

## 2023-11-09 RX ORDER — INSULIN GLARGINE 100 [IU]/ML
15 INJECTION, SOLUTION SUBCUTANEOUS DAILY
Status: DISCONTINUED | OUTPATIENT
Start: 2023-11-10 | End: 2023-11-09

## 2023-11-09 RX ORDER — INSULIN LISPRO 100 [IU]/ML
0-4 INJECTION, SOLUTION INTRAVENOUS; SUBCUTANEOUS NIGHTLY
Status: DISCONTINUED | OUTPATIENT
Start: 2023-11-09 | End: 2023-11-10 | Stop reason: HOSPADM

## 2023-11-09 RX ORDER — INSULIN GLARGINE 100 [IU]/ML
20 INJECTION, SOLUTION SUBCUTANEOUS ONCE
Status: COMPLETED | OUTPATIENT
Start: 2023-11-09 | End: 2023-11-09

## 2023-11-09 RX ORDER — INSULIN GLARGINE 100 [IU]/ML
27 INJECTION, SOLUTION SUBCUTANEOUS DAILY
Status: DISCONTINUED | OUTPATIENT
Start: 2023-11-10 | End: 2023-11-10 | Stop reason: HOSPADM

## 2023-11-09 RX ORDER — INSULIN GLARGINE 100 [IU]/ML
8 INJECTION, SOLUTION SUBCUTANEOUS DAILY
Status: DISCONTINUED | OUTPATIENT
Start: 2023-11-09 | End: 2023-11-09

## 2023-11-09 RX ORDER — INSULIN GLARGINE 100 [IU]/ML
10 INJECTION, SOLUTION SUBCUTANEOUS ONCE
Status: COMPLETED | OUTPATIENT
Start: 2023-11-09 | End: 2023-11-09

## 2023-11-09 RX ADMIN — ISOSORBIDE MONONITRATE 30 MG: 30 TABLET, EXTENDED RELEASE ORAL at 09:21

## 2023-11-09 RX ADMIN — INSULIN LISPRO 8 UNITS: 100 INJECTION, SOLUTION INTRAVENOUS; SUBCUTANEOUS at 17:56

## 2023-11-09 RX ADMIN — CARVEDILOL 25 MG: 25 TABLET, FILM COATED ORAL at 21:06

## 2023-11-09 RX ADMIN — INSULIN GLARGINE 10 UNITS: 100 INJECTION, SOLUTION SUBCUTANEOUS at 13:39

## 2023-11-09 RX ADMIN — ALLOPURINOL 100 MG: 100 TABLET ORAL at 09:21

## 2023-11-09 RX ADMIN — RIVAROXABAN 20 MG: 20 TABLET, FILM COATED ORAL at 09:21

## 2023-11-09 RX ADMIN — INSULIN GLARGINE 20 UNITS: 100 INJECTION, SOLUTION SUBCUTANEOUS at 05:04

## 2023-11-09 RX ADMIN — SODIUM CHLORIDE, PRESERVATIVE FREE 10 ML: 5 INJECTION INTRAVENOUS at 09:22

## 2023-11-09 RX ADMIN — LOSARTAN POTASSIUM 25 MG: 25 TABLET, FILM COATED ORAL at 09:22

## 2023-11-09 RX ADMIN — SODIUM CHLORIDE, SODIUM LACTATE, POTASSIUM CHLORIDE, CALCIUM CHLORIDE, AND DEXTROSE MONOHYDRATE: 600; 310; 30; 20; 5 INJECTION, SOLUTION INTRAVENOUS at 03:13

## 2023-11-09 RX ADMIN — LEVOTHYROXINE SODIUM 125 MCG: 25 TABLET ORAL at 06:34

## 2023-11-09 RX ADMIN — POTASSIUM CHLORIDE 10 MEQ: 7.46 INJECTION, SOLUTION INTRAVENOUS at 05:25

## 2023-11-09 RX ADMIN — SODIUM CHLORIDE, PRESERVATIVE FREE 30 ML: 5 INJECTION INTRAVENOUS at 21:08

## 2023-11-09 RX ADMIN — ENOXAPARIN SODIUM 30 MG: 100 INJECTION SUBCUTANEOUS at 09:22

## 2023-11-09 RX ADMIN — BUMETANIDE 1 MG: 1 TABLET ORAL at 09:21

## 2023-11-09 RX ADMIN — POTASSIUM CHLORIDE 10 MEQ: 7.46 INJECTION, SOLUTION INTRAVENOUS at 03:03

## 2023-11-09 RX ADMIN — POTASSIUM CHLORIDE 10 MEQ: 7.46 INJECTION, SOLUTION INTRAVENOUS at 00:46

## 2023-11-09 RX ADMIN — CALCITRIOL CAPSULES 0.25 MCG 0.25 MCG: 0.25 CAPSULE ORAL at 09:21

## 2023-11-09 RX ADMIN — POTASSIUM CHLORIDE 10 MEQ: 7.46 INJECTION, SOLUTION INTRAVENOUS at 06:34

## 2023-11-09 RX ADMIN — PANTOPRAZOLE SODIUM 40 MG: 40 TABLET, DELAYED RELEASE ORAL at 06:35

## 2023-11-09 RX ADMIN — DULOXETINE HYDROCHLORIDE 60 MG: 60 CAPSULE, DELAYED RELEASE ORAL at 09:21

## 2023-11-09 RX ADMIN — ACETAMINOPHEN 325MG 650 MG: 325 TABLET ORAL at 21:07

## 2023-11-09 RX ADMIN — INSULIN LISPRO 8 UNITS: 100 INJECTION, SOLUTION INTRAVENOUS; SUBCUTANEOUS at 12:55

## 2023-11-09 RX ADMIN — CARVEDILOL 25 MG: 25 TABLET, FILM COATED ORAL at 09:21

## 2023-11-09 RX ADMIN — ACETAMINOPHEN 325MG 650 MG: 325 TABLET ORAL at 03:10

## 2023-11-09 ASSESSMENT — PAIN SCALES - GENERAL
PAINLEVEL_OUTOF10: 0
PAINLEVEL_OUTOF10: 2
PAINLEVEL_OUTOF10: 0
PAINLEVEL_OUTOF10: 0
PAINLEVEL_OUTOF10: 3
PAINLEVEL_OUTOF10: 0

## 2023-11-09 ASSESSMENT — PAIN DESCRIPTION - DESCRIPTORS: DESCRIPTORS: ACHING

## 2023-11-09 ASSESSMENT — PAIN DESCRIPTION - LOCATION: LOCATION: HEAD

## 2023-11-09 ASSESSMENT — PAIN DESCRIPTION - ORIENTATION: ORIENTATION: ANTERIOR

## 2023-11-09 NOTE — ED NOTES
Report given to [facility], all questions answered.       Flaca Sotelo RN  11/08/23 1926

## 2023-11-09 NOTE — PLAN OF CARE
Problem: Discharge Planning  Goal: Discharge to home or other facility with appropriate resources  11/9/2023 1045 by Jazmine Bedolla RN  Outcome: Progressing  11/9/2023 0757 by Janie Farris RN  Outcome: Progressing     Problem: Metabolic/Fluid and Electrolytes - Adult  Goal: Hemodynamic stability and optimal renal function maintained  11/9/2023 1045 by Jazmine Bedolla RN  Outcome: Progressing  11/9/2023 0757 by Janie Farris RN  Outcome: Progressing  Goal: Glucose maintained within prescribed range  11/9/2023 0757 by Janie Farris RN  Outcome: Progressing

## 2023-11-09 NOTE — PLAN OF CARE
Problem: Discharge Planning  Goal: Discharge to home or other facility with appropriate resources  Outcome: Progressing     Problem: Metabolic/Fluid and Electrolytes - Adult  Goal: Hemodynamic stability and optimal renal function maintained  Outcome: Progressing  Goal: Glucose maintained within prescribed range  Outcome: Progressing

## 2023-11-09 NOTE — ED NOTES
Pt A0X4, responds to commands, noted sinus rhythm on cardiac monitor, 99% on room air. Noted pt grimacing in pain. Pt states \"My legs are cramping so bad and I am nauseated. \" Made MD aware at this time. Provided pt with warm compress and Medicated as per MAR. Per Dr. Martinez Bank labs and stop Potassium at this time. \" Pt reports immediate relief, lab specimen sent to lab, bed low and locked, call bell within reach, will continue to monitor.      Kasia Dubon RN  11/08/23 2000

## 2023-11-09 NOTE — PROGRESS NOTES
Blood sugar checked at 1245 was initially 426, recheck was 433, patient given 8 units humalog, per SS,Dr Rosa Zafar made aware and orderred 10 units of lantis to be given as well. This was given to patient, lab phylicia bmp at 1348, and called around 3 pm with serum glucose of 496, finger stick rechecked and was 416 Dr Rosa Zafar informed of this as well as patient receiving the lantis shortly after it was orderred.  Dr Lenin Monreal wants another finger stick to be collected at 6 pm.

## 2023-11-09 NOTE — PLAN OF CARE
Problem: Discharge Planning  Goal: Discharge to home or other facility with appropriate resources  11/9/2023 1046 by Gurjit Gong RN  Outcome: Progressing  11/9/2023 1045 by Gurjit Gong RN  Outcome: Progressing  11/9/2023 0757 by Godfrey Castro RN  Outcome: Progressing     Problem: Metabolic/Fluid and Electrolytes - Adult  Goal: Hemodynamic stability and optimal renal function maintained  11/9/2023 1046 by Gurjit Gong RN  Outcome: Progressing  11/9/2023 1045 by Gurjit Gong RN  Outcome: Progressing  11/9/2023 0757 by Godfrey Castro RN  Outcome: Progressing  Goal: Glucose maintained within prescribed range  11/9/2023 1046 by Gurjit Gong RN  Outcome: Progressing  11/9/2023 0757 by Godfrey Castro RN  Outcome: Progressing

## 2023-11-09 NOTE — ED NOTES
TRANSFER - OUT REPORT:    Verbal report given to Meagan Pena RN on Shalonda Beltran  being transferred to [address] Step Down for routine progression of patient care       Report consisted of patient's Situation, Background, Assessment and   Recommendations(SBAR). Information from the following report(s) ED SBAR, MAR, and Recent Results was reviewed with the receiving nurse. [place] Fall Assessment:    Presents to emergency department  because of falls (Syncope, seizure, or loss of consciousness): No  Age > 70: No  Altered Mental Status, Intoxication with alcohol or substance confusion (Disorientation, impaired judgment, poor safety awaremess, or inability to follow instructions): No  Impaired Mobility: Ambulates or transfers with assistive devices or assistance; Unable to ambulate or transer.: No  Nursing Judgement: No          Lines:   Peripheral IV 11/08/23 Left Antecubital (Active)   Site Assessment Clean, dry & intact 11/08/23 1639   Line Status Blood return noted;Specimen collected 11/08/23 1639   Phlebitis Assessment No symptoms 11/08/23 1639   Infiltration Assessment 0 11/08/23 1639   Dressing Status New dressing applied;Clean, dry & intact 11/08/23 1639   Dressing Type Transparent 11/08/23 1639       Peripheral IV 11/08/23 Left;Posterior Hand (Active)        Opportunity for questions and clarification was provided.       Patient transported with:  Monitor          Anirudh Valentino RN  11/08/23 8222

## 2023-11-09 NOTE — H&P
History & Physical    Patient: Kwaku Canela MRN: [id number]  [facility]: [id number]    YOB: 1960  Age: 58 y.o. Sex: female      DOA: 11/8/2023    Chief Complaint:   Chief Complaint   Patient presents with    Hyperglycemia          HPI:     Kwaku Canela is a 58 y.o. 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 Patient was feeling ill for a few days and was told by her oncologist to go to her primary care physician due to significantly elevated blood glucose level. Patient was unable to go to her primary care physician and as such was advised to go to the emergency room shortly after. In the emergency room, patient was worked up appropriately including labs and imaging as indicated. Notable labs included a blood glucose level over 1000 without obvious acidosis, elevated creatinine, decreased sodium pseudohyponatremia. Given laboratory abnormalities, patient diagnosed with HHS and admitted to stepdown unit.     Past Medical History:   Diagnosis Date    A-fib [facility])     Cancer ([address]) 2000    Right breast ca     Colon cancer ([address])     Congestive heart failure, unspecified     Gout     H/O total mastectomy of right breast     Heart disease     Hx: UTI (urinary tract infection)     Hypercholesterolemia     Iron deficiency anemia     Nausea & vomiting     Renal cell carcinoma of left kidney ([address]) 12/17/15    Renal mass, left     Thyroid cancer ([address])     2017       Past Surgical History:   Procedure Laterality Date    CHOLECYSTECTOMY      IR PORT PLACEMENT EQUAL OR GREATER THAN 5 YEARS  10/15/2019    IR PORT PLACEMENT EQUAL OR GREATER THAN 5 YEARS 10/15/2019 [facility] RAD ANGIO IR    IR PORT PLACEMENT EQUAL OR GREATER THAN 5 YEARS  10/15/2019    IR REMOVE TUNNELED VAD W PORT  3/5/2021    KIDNEY BIOPSY Right 12/17/15    Ct guided bx,

## 2023-11-09 NOTE — PROGRESS NOTES
TRANSFER - IN REPORT:    Verbal report received from [place], [place] on [employer]  being received from [place] for routine progression of patient care      Report consisted of patient's Situation, Background, Assessment and   Recommendations(SBAR). Information from the following report(s) Nurse Handoff Report and Cardiac Rhythm V-paced  was reviewed with the receiving nurse. Opportunity for questions and clarification was provided. Assessment completed upon patient's arrival to unit and care assumed. 2200:  Assumed care of patient. Insulin drip verified. 6323:  Started IV potassium. MD present and discussed plan. 0100:  Pt c/o burning with potassium infusion. Infusion rate slowed. 0303:  New bag of potassium infusing with the D5 in LR for comfort. 0430:   Paged MD for diet orders. 0600:  Offered bath this morning. Pt declines at this time. 3812:  MD paged for potassium of 3.3. no new orders at this time.

## 2023-11-10 VITALS
OXYGEN SATURATION: 95 % | HEIGHT: 66 IN | BODY MASS INDEX: 27.64 KG/M2 | WEIGHT: 172 LBS | TEMPERATURE: 98.1 F | HEART RATE: 75 BPM | RESPIRATION RATE: 19 BRPM | SYSTOLIC BLOOD PRESSURE: 121 MMHG | DIASTOLIC BLOOD PRESSURE: 88 MMHG

## 2023-11-10 LAB
ANION GAP SERPL CALC-SCNC: 7 MMOL/L (ref 3–18)
BUN SERPL-MCNC: 33 MG/DL (ref 7–18)
BUN/CREAT SERPL: 14 (ref 12–20)
CALCIUM SERPL-MCNC: 8.7 MG/DL (ref 8.5–10.1)
CHLORIDE SERPL-SCNC: 108 MMOL/L (ref 100–111)
CO2 SERPL-SCNC: 23 MMOL/L (ref 21–32)
CREAT SERPL-MCNC: 2.31 MG/DL (ref 0.6–1.3)
GLUCOSE BLD STRIP.AUTO-MCNC: 204 MG/DL (ref 70–110)
GLUCOSE BLD STRIP.AUTO-MCNC: 215 MG/DL (ref 70–110)
GLUCOSE SERPL-MCNC: 243 MG/DL (ref 74–99)
MAGNESIUM SERPL-MCNC: 2 MG/DL (ref 1.6–2.6)
POTASSIUM SERPL-SCNC: 3.5 MMOL/L (ref 3.5–5.5)
SODIUM SERPL-SCNC: 138 MMOL/L (ref 136–145)

## 2023-11-10 PROCEDURE — 2580000003 HC RX 258: Performed by: STUDENT IN AN ORGANIZED HEALTH CARE EDUCATION/TRAINING PROGRAM

## 2023-11-10 PROCEDURE — 36415 COLL VENOUS BLD VENIPUNCTURE: CPT

## 2023-11-10 PROCEDURE — 6370000000 HC RX 637 (ALT 250 FOR IP): Performed by: INTERNAL MEDICINE

## 2023-11-10 PROCEDURE — 83735 ASSAY OF MAGNESIUM: CPT

## 2023-11-10 PROCEDURE — 82962 GLUCOSE BLOOD TEST: CPT

## 2023-11-10 PROCEDURE — 6370000000 HC RX 637 (ALT 250 FOR IP): Performed by: STUDENT IN AN ORGANIZED HEALTH CARE EDUCATION/TRAINING PROGRAM

## 2023-11-10 PROCEDURE — 80048 BASIC METABOLIC PNL TOTAL CA: CPT

## 2023-11-10 RX ORDER — INSULIN LISPRO 100 [IU]/ML
5 INJECTION, SOLUTION INTRAVENOUS; SUBCUTANEOUS
Qty: 2 EACH | Refills: 3 | Status: SHIPPED | OUTPATIENT
Start: 2023-11-10 | End: 2023-12-10

## 2023-11-10 RX ORDER — INSULIN GLARGINE 100 [IU]/ML
27 INJECTION, SOLUTION SUBCUTANEOUS NIGHTLY
Qty: 5 ADJUSTABLE DOSE PRE-FILLED PEN SYRINGE | Refills: 3 | Status: SHIPPED | OUTPATIENT
Start: 2023-11-10

## 2023-11-10 RX ORDER — BLOOD-GLUCOSE METER
1 KIT MISCELLANEOUS DAILY
Qty: 1 KIT | Refills: 0 | Status: SHIPPED | OUTPATIENT
Start: 2023-11-10

## 2023-11-10 RX ADMIN — INSULIN GLARGINE 27 UNITS: 100 INJECTION, SOLUTION SUBCUTANEOUS at 08:52

## 2023-11-10 RX ADMIN — INSULIN LISPRO 2 UNITS: 100 INJECTION, SOLUTION INTRAVENOUS; SUBCUTANEOUS at 08:39

## 2023-11-10 RX ADMIN — LOSARTAN POTASSIUM 25 MG: 25 TABLET, FILM COATED ORAL at 08:40

## 2023-11-10 RX ADMIN — RIVAROXABAN 15 MG: 15 TABLET, FILM COATED ORAL at 08:42

## 2023-11-10 RX ADMIN — ACETAMINOPHEN 325MG 650 MG: 325 TABLET ORAL at 08:54

## 2023-11-10 RX ADMIN — CALCITRIOL CAPSULES 0.25 MCG 0.25 MCG: 0.25 CAPSULE ORAL at 08:42

## 2023-11-10 RX ADMIN — ALLOPURINOL 100 MG: 100 TABLET ORAL at 08:42

## 2023-11-10 RX ADMIN — BUMETANIDE 1 MG: 1 TABLET ORAL at 08:42

## 2023-11-10 RX ADMIN — DULOXETINE HYDROCHLORIDE 60 MG: 60 CAPSULE, DELAYED RELEASE ORAL at 08:41

## 2023-11-10 RX ADMIN — LEVOTHYROXINE SODIUM 125 MCG: 25 TABLET ORAL at 06:13

## 2023-11-10 RX ADMIN — ISOSORBIDE MONONITRATE 30 MG: 30 TABLET, EXTENDED RELEASE ORAL at 08:40

## 2023-11-10 RX ADMIN — PANTOPRAZOLE SODIUM 40 MG: 40 TABLET, DELAYED RELEASE ORAL at 06:12

## 2023-11-10 RX ADMIN — CARVEDILOL 25 MG: 25 TABLET, FILM COATED ORAL at 08:40

## 2023-11-10 RX ADMIN — SODIUM CHLORIDE, PRESERVATIVE FREE 10 ML: 5 INJECTION INTRAVENOUS at 08:44

## 2023-11-10 ASSESSMENT — PAIN SCALES - GENERAL
PAINLEVEL_OUTOF10: 2
PAINLEVEL_OUTOF10: 0
PAINLEVEL_OUTOF10: 0
PAINLEVEL_OUTOF10: 3
PAINLEVEL_OUTOF10: 0

## 2023-11-10 ASSESSMENT — PAIN DESCRIPTION - DESCRIPTORS: DESCRIPTORS: DULL

## 2023-11-10 ASSESSMENT — PAIN - FUNCTIONAL ASSESSMENT: PAIN_FUNCTIONAL_ASSESSMENT: ACTIVITIES ARE NOT PREVENTED

## 2023-11-10 ASSESSMENT — PAIN DESCRIPTION - LOCATION
LOCATION: HEAD
LOCATION: HEAD

## 2023-11-10 ASSESSMENT — PAIN DESCRIPTION - ORIENTATION: ORIENTATION: RIGHT

## 2023-11-10 NOTE — PLAN OF CARE
Problem: Discharge Planning  Goal: Discharge to home or other facility with appropriate resources  11/10/2023 1039 by Kaylen Mckinnon RN  Outcome: Completed  11/10/2023 0110 by Donald Velazquez RN  Outcome: Progressing  Flowsheets (Taken 11/9/2023 2106)  Discharge to home or other facility with appropriate resources:   Identify barriers to discharge with patient and caregiver   Identify discharge learning needs (meds, wound care, etc)   Arrange for needed discharge resources and transportation as appropriate     Problem: Metabolic/Fluid and Electrolytes - Adult  Goal: Hemodynamic stability and optimal renal function maintained  11/10/2023 1039 by Kaylen Mckinnon RN  Outcome: Completed  11/10/2023 0110 by Donald Velazquez RN  Outcome: Progressing  Flowsheets (Taken 11/9/2023 2106)  Hemodynamic stability and optimal renal function maintained:   Monitor labs and assess for signs and symptoms of volume excess or deficit   Monitor intake, output and patient weight   Encourage oral intake as appropriate  Goal: Glucose maintained within prescribed range  11/10/2023 1039 by Kaylen Mckinnon RN  Outcome: Completed  11/10/2023 0110 by Donald Velazquez RN  Outcome: Progressing  Flowsheets (Taken 11/9/2023 2106)  Glucose maintained within prescribed range:   Monitor blood glucose as ordered   Assess for signs and symptoms of hyperglycemia and hypoglycemia   Administer ordered medications to maintain glucose within target range   Assess barriers to adequate nutritional intake and initiate nutrition consult as needed     Problem: Safety - Adult  Goal: Free from fall injury  11/10/2023 1039 by Kaylen Mckinnon RN  Outcome: Completed  11/10/2023 0110 by Donald Velazquez RN  Outcome: Progressing  Flowsheets (Taken 11/9/2023 2106)  Free From Fall Injury:   Instruct family/caregiver on patient safety   Based on caregiver fall risk screen, instruct family/caregiver to ask for assistance with transferring infant if caregiver noted to have fall risk factors     Problem: Chronic Conditions and Co-morbidities  Goal: Patient's chronic conditions and co-morbidity symptoms are monitored and maintained or improved  11/10/2023 1039 by Brandon Thurman RN  Outcome: Completed  11/10/2023 0110 by Andrés Guerra RN  Outcome: Progressing     Problem: Pain  Goal: Verbalizes/displays adequate comfort level or baseline comfort level  11/10/2023 1039 by Brandon Thurman RN  Outcome: Completed  11/10/2023 0110 by Andrés Guerra RN  Outcome: Progressing  Flowsheets (Taken 11/9/2023 2106)  Verbalizes/displays adequate comfort level or baseline comfort level:   Encourage patient to monitor pain and request assistance   Assess pain using appropriate pain scale   Implement non-pharmacological measures as appropriate and evaluate response

## 2023-11-10 NOTE — DISCHARGE INSTRUCTIONS
Please make sure you measure your blood sugar at least 3 times a day, especially first thing in the morning before your eat your meal (which would be your fasting blood sugar). The goal is for fasting blood sugar to be  between 80 and 140. Your blood sugar 2 hours after eating,  should be between 140-180. If at any time your blood sugar is less than 60, it is too low and you need to call your primary care physician ASAP or come back to the ER. If it is >400 it is too high and you need to call your primary care physician ASAP for prompt medical attention. If in the next few days your blood sugar is between 180 and 400 increase your lantus to 30 units and call your primary care physician ASAP.

## 2023-11-10 NOTE — PLAN OF CARE
Problem: Discharge Planning  Goal: Discharge to home or other facility with appropriate resources  Outcome: Progressing  Flowsheets (Taken 11/9/2023 2106)  Discharge to home or other facility with appropriate resources:   Identify barriers to discharge with patient and caregiver   Identify discharge learning needs (meds, wound care, etc)   Arrange for needed discharge resources and transportation as appropriate     Problem: Metabolic/Fluid and Electrolytes - Adult  Goal: Hemodynamic stability and optimal renal function maintained  Outcome: Progressing  Flowsheets (Taken 11/9/2023 2106)  Hemodynamic stability and optimal renal function maintained:   Monitor labs and assess for signs and symptoms of volume excess or deficit   Monitor intake, output and patient weight   Encourage oral intake as appropriate  Goal: Glucose maintained within prescribed range  Outcome: Progressing  Flowsheets (Taken 11/9/2023 2106)  Glucose maintained within prescribed range:   Monitor blood glucose as ordered   Assess for signs and symptoms of hyperglycemia and hypoglycemia   Administer ordered medications to maintain glucose within target range   Assess barriers to adequate nutritional intake and initiate nutrition consult as needed     Problem: Safety - Adult  Goal: Free from fall injury  Outcome: Progressing  Flowsheets (Taken 11/9/2023 2106)  Free From Fall Injury:   Instruct family/caregiver on patient safety   Based on caregiver fall risk screen, instruct family/caregiver to ask for assistance with transferring infant if caregiver noted to have fall risk factors     Problem: Chronic Conditions and Co-morbidities  Goal: Patient's chronic conditions and co-morbidity symptoms are monitored and maintained or improved  Outcome: Progressing     Problem: Pain  Goal: Verbalizes/displays adequate comfort level or baseline comfort level  Outcome: Progressing  Flowsheets (Taken 11/9/2023 2106)  Verbalizes/displays adequate comfort level or baseline comfort level:   Encourage patient to monitor pain and request assistance   Assess pain using appropriate pain scale   Implement non-pharmacological measures as appropriate and evaluate response

## 2023-11-10 NOTE — PROGRESS NOTES
@0603 Bedside and Verbal shift change report given to LO Mccarthy (oncoming nurse) by Arnaud Moody RN (offgoing nurse). Report included the following information Nurse Handoff Report, Index, Adult Overview, Intake/Output, Cardiac Rhythm vpaced, and Quality Measures. @1945 - insulin held per order  @2106 Head to toe assessment complete. Diabetic education provided with teach back, safety and medication education provided. Pt c/o 0/10 pain. Pt daughter bedside. Pt's questions answered and encouraged. Fluids and HS snack provided. @2250 Bedside and Verbal shift change report given to LO Schroeder on [address] (oncoming nurse) by Maureen Castelan RN (offgoing nurse). Report included the following information Index, Adult Overview, Intake/Output, Recent Results, Cardiac Rhythm v paced, Quality Measures, and Event LogPt going to room 206. @2300 pt told about transfer. Transfer orders placed. Pt items reviewed. Pt has all items. @5854 called transport. Transport stated they have no transfer orders. Placing orders again. @0029 vitals taken. BG checked. Pt 0/10 pain. Notified pt that Transport will be here soon.

## 2023-11-13 ENCOUNTER — CARE COORDINATION (OUTPATIENT)
Facility: CLINIC | Age: 63
End: 2023-11-13

## 2023-11-13 NOTE — CARE COORDINATION
Care Transitions Initial Follow Up Call    Call within 2 business days of discharge: Yes    Care Transition Nurse contacted the patient by telephone to perform post hospital discharge assessment. Verified name and  with patient as identifiers. Provided introduction to self, and explanation of the Care Transition Nurse role. Patient: Clarence Campbell Patient : 1960   MRN: [id number]  Reason for Admission: Hyperosmolar non-ketotic state in patient with type 2 diabetes mellitus. Discharge Date: 11/10/23 RARS: Readmission Risk Score: 15.2      Last Discharge Facility       Date Complaint Diagnosis Description Type Department Provider    23 Hyperglycemia Hyperosmolar non-ketotic state in patient with type 2 diabetes mellitus ([address] [facility]) ED to Hosp-Admission (Discharged) (ADMITTED) Rosangela Anderson MD; GILBERT De Leon. Was this an external facility discharge? No Discharge Facility: n/a    Challenges to be reviewed by the provider   Additional needs identified to be addressed with provider: No  none               Method of communication with provider: none. Pt. Reported that she is doing much better. No new or worsening of symptoms reported  by Pt. At this time. Pt. Reports that this is not the best time and would like for CTN to call back some other time. Pt. Kept the conversation short and concluded the call. Follow Up  Future Appointments   Date Time Provider [address]   3/12/2024  8:45 AM [facility] LAB VISIT [facility] BS AMB   3/19/2024  2:00 PM Renaldo Womack MD [facility] BS AMB       Care Transition Nurse provided contact information. Plan for follow-up call in 1-2 days based on severity of symptoms and risk factors. Plan for next call:  call to complete  post hospital discharge assessment.   Cori Julian RN

## 2023-11-14 ENCOUNTER — CARE COORDINATION (OUTPATIENT)
Facility: CLINIC | Age: 63
End: 2023-11-14

## 2023-11-14 NOTE — CARE COORDINATION
Care Transitions Initial Follow Up Call    Call within 2 business days of discharge: Yes    Care Transition Nurse contacted the patient by telephone to perform post hospital discharge assessment. Verified name and  with patient as identifiers. Provided introduction to self, and explanation of the Care Transition Nurse role. Patient: Yogesh Aggarwal Patient : 1960   MRN: [id number]  Reason for Admission: Hyperosmolar hyperglycemia  Discharge Date: 11/10/23 RARS: Readmission Risk Score: 15.2      Last Discharge Facility       Date Complaint Diagnosis Description Type Department Provider    23 Hyperglycemia Hyperosmolar non-ketotic state in patient with type 2 diabetes mellitus [facility]) ED to Hosp-Admission (Discharged) (ADMITTED) Darwin Martin MD; GILBERT Arce. Was this an external facility discharge? No Discharge Facility: n/a    Challenges to be reviewed by the provider   Additional needs identified to be addressed with provider: No               Method of communication with provider: none. Patient reported that she is doing good. No new or worsening of symptoms as per Pt. Pt. Reported that her Bg is  low. Pt. Reported that this is being managed by her doctor. Pt. Reported that her doctor called her and gave her instructions on how to treat her low bg. Pt. Reported that she is waiting for her Diabetic Dr.to call her for sooner appt. This week. Offer sooner PCP appt. Pt. Prefers already scheduled PCP appt. No questions, concerns and/or needs at this time as per Pt. Patient reminded that there is a physician on call 24 hours a day / 7 days a week (M-F 5pm to 8am and from Friday 5pm until Monday 8a for the weekend) should the patient have questions or concerns.   Patient reminded to call 911 if situation is emergent ( such as chest pain, shortness of breath, unstoppable bleeding, feeling of passing out,  worsening of symptoms)or patient feels the situation is

## 2023-11-27 ENCOUNTER — OFFICE VISIT (OUTPATIENT)
Age: 63
End: 2023-11-27

## 2023-11-27 VITALS
RESPIRATION RATE: 16 BRPM | OXYGEN SATURATION: 96 % | DIASTOLIC BLOOD PRESSURE: 80 MMHG | HEIGHT: 66 IN | TEMPERATURE: 98.1 F | SYSTOLIC BLOOD PRESSURE: 125 MMHG | HEART RATE: 95 BPM | WEIGHT: 174 LBS | BODY MASS INDEX: 27.97 KG/M2

## 2023-11-27 DIAGNOSIS — E11.22 TYPE 2 DIABETES MELLITUS WITH CHRONIC KIDNEY DISEASE, WITHOUT LONG-TERM CURRENT USE OF INSULIN, UNSPECIFIED CKD STAGE (HCC): Primary | ICD-10-CM

## 2023-11-27 DIAGNOSIS — Z09 HOSPITAL DISCHARGE FOLLOW-UP: ICD-10-CM

## 2023-11-27 DIAGNOSIS — R74.8 ALKALINE PHOSPHATASE ELEVATION: ICD-10-CM

## 2023-11-27 NOTE — PATIENT INSTRUCTIONS
Learning About Tests When You Have Diabetes  Why do you need regular tests? Diabetes can lead to other health problems if it's not well managed. You'll need tests to monitor how well your diabetes is managed and to check for other things like high cholesterol or kidney problems. Having tests on a regular schedule can help your doctor find problems early, when it's best to start treating them. What tests do you need? These are the tests you may need and how often you should have them. The tests may vary depending on whether you have type 1 or type 2 diabetes. A1c blood test.  This test shows the average level of blood sugar over the past 2 to 3 months. It helps your doctor see whether blood sugar levels have been staying within your target range. How often: Every 3 to 6 months  Goal: A blood sugar level in your target range  Blood pressure test.  This test measures the pressure of blood flow in the arteries. Controlling blood pressure can help prevent damage to nerves and blood vessels. How often: Every 3 to 6 months  Goal: A blood pressure level in your target range  Cholesterol test.  This test measures the amount of a type of fat in the blood. It is common for people with diabetes to also have high cholesterol. Too much cholesterol in the blood can build up inside the blood vessels and raise the risk for heart attack and stroke. How often: At the time of your diabetes diagnosis, and as often as your doctor recommends after that  Goal: A cholesterol level in your target range  Albumin-creatinine ratio test.  This test checks for kidney damage by looking for the protein albumin (say \"al-BYOO-reji\") in the urine. Albumin is normally found in the blood. Kidney damage can let small amounts of it (microalbumin) leak into the urine. How often: Once a year  Goal: No protein in the urine  Blood creatinine test/estimated glomerular filtration (eGFR).   The blood creatinine (say \"cere-HP-ae-neen\") level shows

## 2023-12-11 ENCOUNTER — CARE COORDINATION (OUTPATIENT)
Facility: CLINIC | Age: 63
End: 2023-12-11

## 2023-12-11 NOTE — CARE COORDINATION
Care Transitions Follow Up Call  Care Transitions Outreach Attempt    CTN Attempted to reach patient for transitions of care follow up. Unable to reach patient. Left a voice message with office contact information. No Pt. Medical/health information left on message. Patient: Angie Goodwin Patient : 1960 MRN: [id number]    Last Discharge Facility       Date Complaint Diagnosis Description Type Department Provider    23 Hyperglycemia Hyperosmolar non-ketotic state in patient with type 2 diabetes mellitus [facility]) ED to Hosp-Admission (Discharged) (ADMITTED) Evan Cristina MD; [facility]. [facility] follow up appointment(s):   Future Appointments   Date Time Provider [address]   1/10/2024  2:30 PM Joanne Chapman [facility] BS AMB   2024 10:40 AM Mary Langston MD [facility] BS AMB   3/12/2024  8:45 AM [facility] LAB VISIT [facility] BS AMB   3/19/2024  2:00 PM Mary Langston MD [facility] BS AMB     [facility]  follow up appointment(s): none noted at this time.         Kandi Bledsoe RN

## 2023-12-28 ENCOUNTER — CARE COORDINATION (OUTPATIENT)
Facility: CLINIC | Age: 63
End: 2023-12-28

## 2023-12-28 NOTE — CARE COORDINATION
Care Transitions Follow Up Call    Care Transitions Outreach Attempt    CTN Attempted to reach patient for transitions of care follow up. Unable to reach patient. Left a voice message with office contact information. No Pt. Medical/health information left on message . Noted no hospitalization admission post 30 days from discharge date 11/10/23. Care Transition Program is closed and resolved.     Patient: Brooks Waldrop Patient : 1960 MRN: [id number]    Last Discharge Facility       Date Complaint Diagnosis Description Type Department Provider    23 Hyperglycemia Hyperosmolar non-ketotic state in patient with type 2 diabetes mellitus [facility]) ED to Hosp-Admission (Discharged) (ADMITTED) Patra Phalen, MD; Suyapa Bustillo...          follow up appointment(s):   Future Appointments   Date Time Provider [address]   1/10/2024  2:30 PM Senait Ck WALKER [facility] HOSP - [facility] BS AMB   2024 10:40 AM Susu Patterson MD [facility] BS AMB   3/12/2024  8:45 AM [facility] LAB VISIT [facility] BS AMB   3/19/2024  2:00 PM Susu Patterson MD [facility] BS AMB       Jan Sauceda RN

## 2024-01-31 ENCOUNTER — HOSPITAL ENCOUNTER (OUTPATIENT)
Dept: WOMENS IMAGING | Facility: HOSPITAL | Age: 64
Discharge: HOME | End: 2024-02-03
Attending: INTERNAL MEDICINE
Payer: COMMERCIAL

## 2024-01-31 DIAGNOSIS — Z12.31 VISIT FOR SCREENING MAMMOGRAM: ICD-10-CM

## 2024-01-31 PROCEDURE — 77063 BREAST TOMOSYNTHESIS BI: CPT

## 2024-02-06 ENCOUNTER — HOSPITAL ENCOUNTER (OUTPATIENT)
Facility: HOSPITAL | Age: 64
Discharge: HOME | End: 2024-02-09
Payer: COMMERCIAL

## 2024-02-06 DIAGNOSIS — I10 HYPERTENSION, ESSENTIAL: ICD-10-CM

## 2024-02-06 DIAGNOSIS — E11.22 TYPE 2 DIABETES MELLITUS WITH ESRD (END-STAGE RENAL DISEASE) (HCC): ICD-10-CM

## 2024-02-06 DIAGNOSIS — N18.6 TYPE 2 DIABETES MELLITUS WITH ESRD (END-STAGE RENAL DISEASE) (HCC): ICD-10-CM

## 2024-02-06 DIAGNOSIS — R80.9 PROTEINURIA, UNSPECIFIED TYPE: ICD-10-CM

## 2024-02-06 DIAGNOSIS — E55.9 VITAMIN D DEFICIENCY: ICD-10-CM

## 2024-02-06 DIAGNOSIS — M10.9 PODAGRA: ICD-10-CM

## 2024-02-06 DIAGNOSIS — N18.4 STAGE 4 CHRONIC KIDNEY DISEASE (HCC): ICD-10-CM

## 2024-02-06 LAB
25(OH)D3 SERPL-MCNC: 32.2 NG/ML (ref 30–100)
ALBUMIN SERPL-MCNC: 3.7 G/DL (ref 3.4–5)
ANION GAP SERPL CALC-SCNC: 6 MMOL/L (ref 3–18)
BASOPHILS # BLD: 0.1 K/UL (ref 0–0.1)
BASOPHILS NFR BLD: 1 % (ref 0–2)
BUN SERPL-MCNC: 33 MG/DL (ref 7–18)
BUN/CREAT SERPL: 15 (ref 12–20)
CALCIUM SERPL-MCNC: 9.7 MG/DL (ref 8.5–10.1)
CALCIUM SERPL-MCNC: 9.9 MG/DL (ref 8.5–10.1)
CHLORIDE SERPL-SCNC: 109 MMOL/L (ref 100–111)
CO2 SERPL-SCNC: 26 MMOL/L (ref 21–32)
CREAT SERPL-MCNC: 2.27 MG/DL (ref 0.6–1.3)
CREAT UR-MCNC: 46 MG/DL (ref 30–125)
CREAT UR-MCNC: 47 MG/DL (ref 30–125)
DIFFERENTIAL METHOD BLD: ABNORMAL
EOSINOPHIL # BLD: 0.4 K/UL (ref 0–0.4)
EOSINOPHIL NFR BLD: 5 % (ref 0–5)
ERYTHROCYTE [DISTWIDTH] IN BLOOD BY AUTOMATED COUNT: 15.6 % (ref 11.6–14.5)
GLUCOSE SERPL-MCNC: 105 MG/DL (ref 74–99)
HCT VFR BLD AUTO: 40.7 % (ref 35–45)
HGB BLD-MCNC: 13.4 G/DL (ref 12–16)
IMM GRANULOCYTES # BLD AUTO: 0 K/UL (ref 0–0.04)
IMM GRANULOCYTES NFR BLD AUTO: 0 % (ref 0–0.5)
LYMPHOCYTES # BLD: 1.9 K/UL (ref 0.9–3.6)
LYMPHOCYTES NFR BLD: 23 % (ref 21–52)
MCH RBC QN AUTO: 31 PG (ref 24–34)
MCHC RBC AUTO-ENTMCNC: 32.9 G/DL (ref 31–37)
MCV RBC AUTO: 94.2 FL (ref 78–100)
MICROALBUMIN UR-MCNC: 2.38 MG/DL (ref 0–3)
MICROALBUMIN/CREAT UR-RTO: 51 MG/G (ref 0–30)
MONOCYTES # BLD: 0.5 K/UL (ref 0.05–1.2)
MONOCYTES NFR BLD: 6 % (ref 3–10)
NEUTS SEG # BLD: 5.4 K/UL (ref 1.8–8)
NEUTS SEG NFR BLD: 66 % (ref 40–73)
NRBC # BLD: 0 K/UL (ref 0–0.01)
NRBC BLD-RTO: 0 PER 100 WBC
PHOSPHATE SERPL-MCNC: 3.6 MG/DL (ref 2.5–4.9)
PLATELET # BLD AUTO: 220 K/UL (ref 135–420)
PMV BLD AUTO: 11 FL (ref 9.2–11.8)
POTASSIUM SERPL-SCNC: 3.5 MMOL/L (ref 3.5–5.5)
PROT UR-MCNC: 11 MG/DL
PTH-INTACT SERPL-MCNC: 95.9 PG/ML (ref 18.4–88)
RBC # BLD AUTO: 4.32 M/UL (ref 4.2–5.3)
SODIUM SERPL-SCNC: 141 MMOL/L (ref 136–145)
URATE SERPL-MCNC: 6.6 MG/DL (ref 2.6–7.2)
WBC # BLD AUTO: 8.2 K/UL (ref 4.6–13.2)

## 2024-02-06 PROCEDURE — 83970 ASSAY OF PARATHORMONE: CPT

## 2024-02-06 PROCEDURE — 82570 ASSAY OF URINE CREATININE: CPT

## 2024-02-06 PROCEDURE — 85025 COMPLETE CBC W/AUTO DIFF WBC: CPT

## 2024-02-06 PROCEDURE — 36415 COLL VENOUS BLD VENIPUNCTURE: CPT

## 2024-02-06 PROCEDURE — 80069 RENAL FUNCTION PANEL: CPT

## 2024-02-06 PROCEDURE — 84550 ASSAY OF BLOOD/URIC ACID: CPT

## 2024-02-06 PROCEDURE — 82306 VITAMIN D 25 HYDROXY: CPT

## 2024-02-06 PROCEDURE — 84156 ASSAY OF PROTEIN URINE: CPT

## 2024-02-06 PROCEDURE — 82043 UR ALBUMIN QUANTITATIVE: CPT

## 2024-03-05 RX ORDER — ATORVASTATIN CALCIUM 40 MG/1
40 TABLET, FILM COATED ORAL DAILY
Qty: 90 TABLET | Refills: 1 | OUTPATIENT
Start: 2024-03-05

## 2024-03-13 NOTE — PERIOP NOTE
Instructions for your procedure at [facility]      Today's Date: 3/13/2024      Patient's Name: Lorna Haro      Procedure Date: 3/25        Please enter the main entrance of the hospital and check-in at the  located in the lobby.      Do NOT eat or drink anything, including candy, gum, or ice chips after midnight prior to your procedure, unless it is part of your prep.  Brush your teeth before coming to the hospital.You may swish with water, but do not swallow.  No smoking/Vaping/E-Cigarettes 24 hours prior to the day of procedure.  No alcohol 24 hours prior to the day of procedure.  No recreational drugs for one week prior to the day of procedure.  Bring Photo ID, Insurance information, and Co-pay if required on day of procedure.  Bring in pertinent legal documents, such as, Medical Power of , DNR, Advance Directive, etc.  Leave all other valuables, including money/purse, at home.  Remove jewelry, including ALL body piercings, nail polish, acrylic nails, and makeup (including mascara); no lotions, powders, deodorant, and/or perfume/cologne/after shave on the skin.  Glasses and dentures may be worn to the hospital.  They must be removed prior to procedure. Please bring case/container for glasses or dentures.  11. Contacts should not be worn on day of procedure.   12. Call the office ([phone]) if you have symptoms of a cold or illness within 24-48 hours prior to your procedure.   13. AN ADULT (relative or friend 18 years or older) MUST DRIVE YOU HOME AFTER YOUR PROCEDURE.   14. Please make arrangements for a responsible adult (18 years or older) to be with you for 24 hours after your procedure.   15. ONE VISITOR will be allowed in the waiting area during your procedure.       Special Instructions:      Bring list of CURRENT medications.  Follow instructions from the office regarding Bowel Prep, Vitamins, Iron, Blood Thinners, Insulin, Seizure, and Blood  Pressure/Heart medications.  Bring inhaler.  Bring CPAP machine.    Any questions regarding prep, please call the office at [phone].    For any questions or concerns on the day of procedure, please call the Endo Suite at [phone].    These surgical instructions were reviewed with jessica kitchen during the PAT phone call.

## 2024-03-22 ENCOUNTER — ANESTHESIA EVENT (OUTPATIENT)
Facility: HOSPITAL | Age: 64
End: 2024-03-22
Payer: COMMERCIAL

## 2024-03-25 ENCOUNTER — HOSPITAL ENCOUNTER (OUTPATIENT)
Facility: HOSPITAL | Age: 64
Setting detail: OUTPATIENT SURGERY
Discharge: HOME | End: 2024-03-25
Attending: INTERNAL MEDICINE | Admitting: INTERNAL MEDICINE
Payer: COMMERCIAL

## 2024-03-25 ENCOUNTER — ANESTHESIA (OUTPATIENT)
Facility: HOSPITAL | Age: 64
End: 2024-03-25
Payer: COMMERCIAL

## 2024-03-25 VITALS
DIASTOLIC BLOOD PRESSURE: 93 MMHG | HEART RATE: 71 BPM | WEIGHT: 170 LBS | OXYGEN SATURATION: 99 % | TEMPERATURE: 98.4 F | RESPIRATION RATE: 20 BRPM | HEIGHT: 66 IN | SYSTOLIC BLOOD PRESSURE: 160 MMHG | BODY MASS INDEX: 27.32 KG/M2

## 2024-03-25 LAB
GLUCOSE BLD STRIP.AUTO-MCNC: 109 MG/DL (ref 70–110)
GLUCOSE BLD STRIP.AUTO-MCNC: 130 MG/DL (ref 70–110)

## 2024-03-25 PROCEDURE — 2580000003 HC RX 258: Performed by: NURSE ANESTHETIST, CERTIFIED REGISTERED

## 2024-03-25 PROCEDURE — 82962 GLUCOSE BLOOD TEST: CPT

## 2024-03-25 PROCEDURE — 2500000003 HC RX 250 WO HCPCS: Performed by: ANESTHESIOLOGY

## 2024-03-25 PROCEDURE — 7100000000 HC PACU RECOVERY - FIRST 15 MIN: Performed by: INTERNAL MEDICINE

## 2024-03-25 PROCEDURE — 2709999900 HC NON-CHARGEABLE SUPPLY: Performed by: INTERNAL MEDICINE

## 2024-03-25 PROCEDURE — 7100000010 HC PHASE II RECOVERY - FIRST 15 MIN: Performed by: INTERNAL MEDICINE

## 2024-03-25 PROCEDURE — 3700000000 HC ANESTHESIA ATTENDED CARE: Performed by: INTERNAL MEDICINE

## 2024-03-25 PROCEDURE — 3600007503: Performed by: INTERNAL MEDICINE

## 2024-03-25 PROCEDURE — 6360000002 HC RX W HCPCS: Performed by: ANESTHESIOLOGY

## 2024-03-25 RX ORDER — SODIUM CHLORIDE, SODIUM LACTATE, POTASSIUM CHLORIDE, CALCIUM CHLORIDE 600; 310; 30; 20 MG/100ML; MG/100ML; MG/100ML; MG/100ML
INJECTION, SOLUTION INTRAVENOUS CONTINUOUS
Status: DISCONTINUED | OUTPATIENT
Start: 2024-03-25 | End: 2024-03-25 | Stop reason: HOSPADM

## 2024-03-25 RX ORDER — PROPOFOL 10 MG/ML
INJECTION, EMULSION INTRAVENOUS PRN
Status: DISCONTINUED | OUTPATIENT
Start: 2024-03-25 | End: 2024-03-25 | Stop reason: SDUPTHER

## 2024-03-25 RX ORDER — DEXTROSE MONOHYDRATE 100 MG/ML
INJECTION, SOLUTION INTRAVENOUS CONTINUOUS PRN
Status: DISCONTINUED | OUTPATIENT
Start: 2024-03-25 | End: 2024-03-25 | Stop reason: HOSPADM

## 2024-03-25 RX ORDER — SODIUM CHLORIDE 0.9 % (FLUSH) 0.9 %
5-40 SYRINGE (ML) INJECTION EVERY 12 HOURS SCHEDULED
Status: DISCONTINUED | OUTPATIENT
Start: 2024-03-25 | End: 2024-03-25 | Stop reason: HOSPADM

## 2024-03-25 RX ORDER — LIDOCAINE HYDROCHLORIDE 20 MG/ML
INJECTION, SOLUTION EPIDURAL; INFILTRATION; INTRACAUDAL; PERINEURAL PRN
Status: DISCONTINUED | OUTPATIENT
Start: 2024-03-25 | End: 2024-03-25 | Stop reason: SDUPTHER

## 2024-03-25 RX ORDER — INSULIN LISPRO 100 [IU]/ML
0-12 INJECTION, SOLUTION INTRAVENOUS; SUBCUTANEOUS ONCE
Status: DISCONTINUED | OUTPATIENT
Start: 2024-03-25 | End: 2024-03-25 | Stop reason: HOSPADM

## 2024-03-25 RX ORDER — SODIUM CHLORIDE 9 MG/ML
INJECTION, SOLUTION INTRAVENOUS PRN
Status: DISCONTINUED | OUTPATIENT
Start: 2024-03-25 | End: 2024-03-25

## 2024-03-25 RX ORDER — SODIUM CHLORIDE 0.9 % (FLUSH) 0.9 %
5-40 SYRINGE (ML) INJECTION PRN
Status: DISCONTINUED | OUTPATIENT
Start: 2024-03-25 | End: 2024-03-25 | Stop reason: HOSPADM

## 2024-03-25 RX ADMIN — PROPOFOL 150 MG: 10 INJECTION, EMULSION INTRAVENOUS at 11:57

## 2024-03-25 RX ADMIN — SODIUM CHLORIDE, POTASSIUM CHLORIDE, SODIUM LACTATE AND CALCIUM CHLORIDE: 600; 310; 30; 20 INJECTION, SOLUTION INTRAVENOUS at 10:44

## 2024-03-25 RX ADMIN — LIDOCAINE HYDROCHLORIDE 100 MG: 20 INJECTION, SOLUTION EPIDURAL; INFILTRATION; INTRACAUDAL; PERINEURAL at 11:57

## 2024-03-25 NOTE — ANESTHESIA PRE PROCEDURE
IR   • IR PORT PLACEMENT EQUAL OR GREATER THAN 5 YEARS  10/15/2019   • IR REMOVE TUNNELED VAD W PORT  3/5/2021   • KIDNEY BIOPSY Right 12/17/15    Ct guided bx, Dr. Goodwin, [facility]   • KIDNEY REMOVAL Right 3/22/16    Partial x2, Dr. Díaz, [facility]   • MASTECTOMY Right    • ORTHOPEDIC SURGERY  04/19/2017    Trigger finger release R hand   • THYROIDECTOMY  6/20/2018       Social History:    Social History     Tobacco Use   • Smoking status: Never   • Smokeless tobacco: Never   Substance Use Topics   • Alcohol use: No                                Counseling given: Not Answered      Vital Signs (Current):   Vitals:    03/13/24 1135 03/25/24 1027   BP:  133/89   Pulse:  85   Resp:  16   Temp:  97.9 °F (36.6 °C)   TempSrc:  Oral   SpO2:  100%   Weight: 77.1 kg (170 lb)    Height: 1.676 m (5' 6\")                                               BP Readings from Last 3 Encounters:   03/25/24 133/89   11/27/23 125/80   11/10/23 121/88       NPO Status: Time of last liquid consumption: 0600                        Time of last solid consumption: 1800                        Date of last liquid consumption: 03/25/24                        Date of last solid food consumption: 03/24/24    BMI:   Wt Readings from Last 3 Encounters:   03/13/24 77.1 kg (170 lb)   11/27/23 78.9 kg (174 lb)   11/08/23 78 kg (172 lb)     Body mass index is 27.44 kg/m².    CBC:   Lab Results   Component Value Date/Time    WBC 8.2 02/06/2024 01:43 PM    RBC 4.32 02/06/2024 01:43 PM    HGB 13.4 02/06/2024 01:43 PM    HCT 40.7 02/06/2024 01:43 PM    MCV 94.2 02/06/2024 01:43 PM    RDW 15.6 02/06/2024 01:43 PM     02/06/2024 01:43 PM       CMP:   Lab Results   Component Value Date/Time     02/06/2024 01:43 PM    K 3.5 02/06/2024 01:43 PM     02/06/2024 01:43 PM    CO2 26 02/06/2024 01:43 PM    CO2 20 09/17/2019 11:48 AM    BUN 33 02/06/2024 01:43 PM    CREATININE 2.27 02/06/2024 01:43 PM    GFRAA 22 06/01/2022 10:45 AM    AGRATIO 1.2

## 2024-03-25 NOTE — DISCHARGE INSTRUCTIONS
Upper GI Endoscopy: What to Expect at Home  Your Recovery  You had an upper GI endoscopy. Your doctor used a thin, lighted tube that bends to look at the inside of your esophagus, your stomach, and the first part of the small intestine, called the duodenum.  After you have an endoscopy, you will stay at the hospital or clinic for 1 to 2 hours. This will allow the medicine to wear off. You will be able to go home after your doctor or nurse checks to make sure that you're not having any problems.  You may have to stay overnight if you had treatment during the test. You may have a sore throat for a day or two after the test.  This care sheet gives you a general idea about what to expect after the test.  How can you care for yourself at home?  Activity   Rest as much as you need to after you go home.  You should be able to go back to your usual activities the day after the test.  Diet   Follow your doctor's directions for eating after the test.  Drink plenty of fluids (unless your doctor has told you not to).  Medications   If you have a sore throat the day after the test, use an over-the-counter spray to numb your throat.  Follow-up care is a key part of your treatment and safety. Be sure to make and go to all appointments, and call your doctor if you are having problems. It's also a good idea to know your test results and keep a list of the medicines you take.  When should you call for help?   Call 911 anytime you think you may need emergency care. For example, call if:    You passed out (lost consciousness).     You have trouble breathing.     You pass maroon or bloody stools.   Call your doctor now or seek immediate medical care if:    You have pain that does not get better after your take pain medicine.     You have new or worse belly pain.     You have blood in your stools.     You are sick to your stomach and cannot keep fluids down.     You have a fever.     You cannot pass stools or gas.   Watch closely for  references and/or instructions provided during this visit included:    See Attached      APPOINTMENTS:    Per MD Instruction    Discharge information has been reviewed with the patient.  The patient verbalized understanding.        Colon Polyps: Care Instructions  Your Care Instructions     Colon polyps are growths in the colon or the rectum. The cause of most colon polyps is not known, and most people who get them do not have any problems. But a certain kind can turn into cancer. For this reason, regular testing for colon polyps is important for people as they get older. It is also important for anyone who has an increased risk for colon cancer.  Polyps are usually found through routine colon cancer screening tests. Although most colon polyps are not cancerous, they are usually removed and then tested for cancer. Screening for colon cancer saves lives because the cancer can usually be cured if it is caught early.  If you have a polyp that is the type that can turn into cancer, you may need more tests to examine your entire colon. The doctor will remove any other polyps that are found, and you will be tested more often.  Follow-up care is a key part of your treatment and safety. Be sure to make and go to all appointments, and call your doctor if you are having problems. It's also a good idea to know your test results and keep a list of the medicines you take.  How can you care for yourself at home?  Regular exams to look for colon polyps are the best way to prevent polyps from turning into colon cancer. These can include stool tests, sigmoidoscopy, colonoscopy, and CT colonography. Talk with your doctor about a testing schedule that is right for you.  To prevent polyps  There is no home treatment that can prevent colon polyps. But these steps may help lower your risk for cancer.  Stay active. Being active can help you get to and stay at a healthy weight. Try to exercise on most days of the week. Walking is a good

## 2024-03-25 NOTE — ANESTHESIA POSTPROCEDURE EVALUATION
Department of Anesthesiology  Postprocedure Note    Patient: Lorna Haro  MRN: [id number]  YOB: 1960  Date of evaluation: 3/25/2024    Procedure Summary       Date: 03/25/24 Room / Location: [facility] ENDO 02 / [facility] ENDOSCOPY    Anesthesia Start: 1152 Anesthesia Stop: 1209    Procedures:       ESOPHAGOGASTRODUODENOSCOPY (Upper GI Region)      COLONOSCOPY DIAGNOSTIC (Abdomen) Diagnosis:       Malignant neoplasm of colon, unspecified part of colon (HCC)      Alkaline phosphatase raised      Lymphadenopathy      (Malignant neoplasm of colon, unspecified part of colon (HCC) [C18.9])      (Alkaline phosphatase raised [R74.8])      (Lymphadenopathy [R59.1])    Surgeons: Pola Tinajero MD Responsible Provider: Salvador Dey MD    Anesthesia Type: MAC ASA Status: 3            Anesthesia Type: MAC    Angelique Phase I: Angelique Score: 9    Angelique Phase II: Angelique Score: 10    Anesthesia Post Evaluation    Patient location during evaluation: bedside  Patient participation: complete - patient participated  Level of consciousness: awake  Pain score: 0  Airway patency: patent  Nausea & Vomiting: no nausea  Cardiovascular status: hemodynamically stable  Respiratory status: acceptable  Hydration status: euvolemic  Pain management: satisfactory to patient    No notable events documented.

## 2024-03-25 NOTE — H&P
[phone]    GASTROENTEROLOGY Pre-Procedure H and P      Impression/Plan:   1. This patient is consented for an EGD and colonoscopy for elevated alk phos and hx colon cancer       Chief Complaint: elevated alk phos and hx colon cancer     HPI:  Lorna Haro is a 63 y.o. female who is having an EGD and colonoscopy for elevated alk phos and hx colon cancer   PMH:   Past Medical History:   Diagnosis Date    A-fib (HCC)     Cancer (HCC) 2000    Right breast ca     CKD (chronic kidney disease)     Colon cancer (HCC)     Congestive heart failure, unspecified     Diabetes mellitus (HCC)     Gout     H/O total mastectomy of right breast     Heart disease     Hx: UTI (urinary tract infection)     Hypercholesterolemia     Hypertension     Hypothyroidism     Iron deficiency anemia     Nausea & vomiting     GALLO (obstructive sleep apnea)     Pacemaker     Renal cell carcinoma of left kidney (HCC) 12/17/15    Renal mass, left     Thyroid cancer (HCC)     2017       PSH:   Past Surgical History:   Procedure Laterality Date    CHOLECYSTECTOMY      IR PORT PLACEMENT EQUAL OR GREATER THAN 5 YEARS  10/15/2019    IR PORT PLACEMENT EQUAL OR GREATER THAN 5 YEARS 10/15/2019 [facility] RAD ANGIO IR    IR PORT PLACEMENT EQUAL OR GREATER THAN 5 YEARS  10/15/2019    IR REMOVE TUNNELED VAD W PORT  3/5/2021    KIDNEY BIOPSY Right 12/17/15    Ct guided bx, Dr. Goodwin, [facility]    KIDNEY REMOVAL Right 3/22/16    Partial x2, Dr. Díaz, [facility]    MASTECTOMY Right     ORTHOPEDIC SURGERY  04/19/2017    Trigger finger release R hand    THYROIDECTOMY  6/20/2018       Social HX:   Social History     Socioeconomic History    Marital status:      Spouse name: Not on file    Number of children: Not on file    Years of education: Not on file    Highest education level: Not on file   Occupational History    Not on file   Tobacco Use    Smoking status: Never    Smokeless tobacco: Never   Substance and Sexual Activity    Alcohol use: No    Drug use: No  H&P   Genitourinary: No dysuria, bleeding, discharge, pyuria.   Musculoskeletal: No weakness, arthralgias, wasting.   Endo: No sweats.   Heme: No bruising, easy bleeding.   Allergies: As noted.   Neurological: Cranial nerves intact.  Alert and oriented. Gait not assessed.   Psychiatric:  No anxiety, depression, hallucinations.          Ht 1.676 m (5' 6\")   Wt 77.1 kg (170 lb)   BMI 27.44 kg/m²     Physical Assessment:     constitutional: appearance: well developed, well nourished, normal habitus, no deformities, in no acute distress.   skin: inspection: no rashes, ulcers, icterus or other lesions; no clubbing or telangiectasias. palpation: no induration or subcutaneos nodules.   eyes: inspection: normal conjunctivae and lids; no jaundice pupils: normal  ENMT: mouth: normal oral mucosa,lips and gums; good dentition. oropharynx: normal tongue, hard and soft palate; posterior pharynx without erithema, exudate or lesions.   neck: thyroid: normal size, consistency and position; no masses or tenderness.   respiratory: effort: normal chest excursion; no intercostal retraction or accessory muscle use.   cardiovascular: abdominal aorta: normal size and position; no bruits. palpation: PMI of normal size and position; normal rhythm; no thrill or murmurs.   abdominal: abdomen: normal consistency; no tenderness or masses. hernias: no hernias appreciated. liver: normal size and consistency. spleen: not palpable.   rectal: hemoccult/guaiac: not performed.   musculoskeletal: digits and nails: no clubbing, cyanosis, petechiae or other inflammatory conditions. gait: normal gait and station head and neck: normal range of motion; no pain, crepitation or contracture. spine/ribs/pelvis: normal range of motion; no pain, deformity or contracture.   neurologic: cranial nerves: II-XII normal.   psychiatric: judgement/insight: within normal limits. memory: within normal limits for recent and remote events. mood and affect: no evidence of

## 2024-03-26 RX ORDER — LANSOPRAZOLE 30 MG/1
CAPSULE, DELAYED RELEASE ORAL
Qty: 30 CAPSULE | Refills: 5 | Status: SHIPPED | OUTPATIENT
Start: 2024-03-26

## 2024-03-26 NOTE — TELEPHONE ENCOUNTER
Please refill and send to [facility] [id number] [facility] [employer] [facility] [address] - P [phone] - F [phone] [[id number]]     lansoprazole (PREVACID) 30 MG delayed release capsule

## 2024-04-02 PROBLEM — N28.89 LEFT RENAL MASS: Status: ACTIVE | Noted: 2017-08-18

## 2024-04-02 PROBLEM — R53.83 FATIGUE: Status: ACTIVE | Noted: 2024-04-02

## 2024-04-02 PROBLEM — Z98.890 HISTORY OF TOTAL THYROIDECTOMY: Status: ACTIVE | Noted: 2024-04-02

## 2024-04-02 PROBLEM — Z95.810 PRESENCE OF BIVENTRICULAR AICD: Status: ACTIVE | Noted: 2021-06-09

## 2024-04-02 PROBLEM — Z86.16 HISTORY OF SEVERE ACUTE RESPIRATORY SYNDROME CORONAVIRUS 2 (SARS-COV-2) DISEASE: Status: ACTIVE | Noted: 2024-04-02

## 2024-04-02 PROBLEM — N17.9 AKI (ACUTE KIDNEY INJURY) (HCC): Status: ACTIVE | Noted: 2021-01-15

## 2024-04-02 PROBLEM — I48.0 PAROXYSMAL ATRIAL FIBRILLATION (HCC): Status: ACTIVE | Noted: 2021-01-15

## 2024-04-02 PROBLEM — E53.8 COBALAMIN DEFICIENCY: Status: ACTIVE | Noted: 2024-04-02

## 2024-04-02 PROBLEM — D69.6 DISORDER INVOLVING THROMBOCYTOPENIA (HCC): Status: ACTIVE | Noted: 2024-04-02

## 2024-04-02 PROBLEM — C50.919 PRIMARY MALIGNANT NEOPLASM OF FEMALE BREAST (HCC): Status: ACTIVE | Noted: 2024-04-02

## 2024-04-02 PROBLEM — R06.03 ACUTE RESPIRATORY DISTRESS: Status: ACTIVE | Noted: 2021-01-16

## 2024-04-02 PROBLEM — Z90.89 HISTORY OF TOTAL THYROIDECTOMY: Status: ACTIVE | Noted: 2024-04-02

## 2024-04-02 PROBLEM — R06.09 DOE (DYSPNEA ON EXERTION): Status: ACTIVE | Noted: 2021-01-13

## 2024-04-02 PROBLEM — E89.0 HISTORY OF TOTAL THYROIDECTOMY: Status: ACTIVE | Noted: 2024-04-02

## 2024-04-02 RX ORDER — BLOOD-GLUCOSE METER
KIT MISCELLANEOUS
COMMUNITY
Start: 2024-03-03

## 2024-04-02 RX ORDER — BLOOD-GLUCOSE SENSOR
EACH MISCELLANEOUS
COMMUNITY
Start: 2024-03-11

## 2024-04-02 RX ORDER — ATORVASTATIN CALCIUM 20 MG/1
1 TABLET, FILM COATED ORAL DAILY
COMMUNITY

## 2024-04-02 RX ORDER — LANCETS 28 GAUGE
EACH MISCELLANEOUS
COMMUNITY
Start: 2024-03-03

## 2024-04-02 RX ORDER — ONDANSETRON 4 MG/1
TABLET, ORALLY DISINTEGRATING ORAL
COMMUNITY
Start: 2024-02-22

## 2024-04-02 RX ORDER — INSULIN ASPART 100 [IU]/ML
INJECTION, SOLUTION INTRAVENOUS; SUBCUTANEOUS
COMMUNITY
Start: 2024-03-04

## 2024-04-02 RX ORDER — POLYETHYLENE GLYCOL-3350 AND ELECTROLYTES 236; 6.74; 5.86; 2.97; 22.74 G/274.31G; G/274.31G; G/274.31G; G/274.31G; G/274.31G
POWDER, FOR SOLUTION ORAL
COMMUNITY
Start: 2024-03-24

## 2024-04-17 ENCOUNTER — OFFICE VISIT (OUTPATIENT)
Age: 64
End: 2024-04-17
Payer: MEDICARE

## 2024-04-17 VITALS
DIASTOLIC BLOOD PRESSURE: 69 MMHG | HEIGHT: 66 IN | SYSTOLIC BLOOD PRESSURE: 105 MMHG | BODY MASS INDEX: 27.93 KG/M2 | OXYGEN SATURATION: 100 % | TEMPERATURE: 97.9 F | HEART RATE: 83 BPM | WEIGHT: 173.8 LBS | RESPIRATION RATE: 18 BRPM

## 2024-04-17 DIAGNOSIS — R59.0 HILAR LYMPHADENOPATHY: ICD-10-CM

## 2024-04-17 DIAGNOSIS — Z85.3 HISTORY OF BREAST CANCER: ICD-10-CM

## 2024-04-17 DIAGNOSIS — R59.0 MEDIASTINAL LYMPHADENOPATHY: Primary | ICD-10-CM

## 2024-04-17 DIAGNOSIS — C18.9 MALIGNANT NEOPLASM OF COLON, UNSPECIFIED PART OF COLON (HCC): ICD-10-CM

## 2024-04-17 PROCEDURE — 3078F DIAST BP <80 MM HG: CPT | Performed by: INTERNAL MEDICINE

## 2024-04-17 PROCEDURE — 3074F SYST BP LT 130 MM HG: CPT | Performed by: INTERNAL MEDICINE

## 2024-04-17 PROCEDURE — G8428 CUR MEDS NOT DOCUMENT: HCPCS | Performed by: INTERNAL MEDICINE

## 2024-04-17 PROCEDURE — G8419 CALC BMI OUT NRM PARAM NOF/U: HCPCS | Performed by: INTERNAL MEDICINE

## 2024-04-17 PROCEDURE — 1036F TOBACCO NON-USER: CPT | Performed by: INTERNAL MEDICINE

## 2024-04-17 PROCEDURE — 99204 OFFICE O/P NEW MOD 45 MIN: CPT | Performed by: INTERNAL MEDICINE

## 2024-04-17 PROCEDURE — 3017F COLORECTAL CA SCREEN DOC REV: CPT | Performed by: INTERNAL MEDICINE

## 2024-04-17 NOTE — PROGRESS NOTES
Lorna Haro presents today for   Chief Complaint   Patient presents with    New Patient     Referred by Dr. Ratna Barkley for Acute Respiratory Distress, Pulmonary Nodule       Is someone accompanying this pt?     Is the patient using any DME equipment during OV? No    -DME Company NA    Depression Screenin/17/2023    10:59 AM   PHQ-9 Questionaire   Little interest or pleasure in doing things 0   Feeling down, depressed, or hopeless 0   PHQ-9 Total Score 0       Learning Needs Questionnaire:     Who is the primary learner? Patient    What is the preferred language for health care of the primary learner? ENGLISH    How does the primary learner prefer to learn new concepts? DEMONSTRATION    Answered By Patient    Relationship to Learner SELF          Fall Risk:         2023    10:22 AM   Fall Risk   2 or more falls in past year? no   Fall with injury in past year? no        Abuse Screening:          No data to display                  Coordination of Care:    1. Have you been to the ER, urgent care clinic since your last visit? Hospitalized since your last visit? No    2. Have you seen or consulted any other health care providers outside of the [facility] since your last visit? Include any pap smears or colon screening. Oncology-Dr. Barkley, [facility]-Dr. Rai, Cardiology-Dr. Hernandez, Gastroenterology-Dr. Luna    Medication list has been update per patient.        
attempt at diagnosis with bronchoscopy with EBUS prior to any surgical approach.  -Discussed with patient's oncologist, Dr. Barkley  -Will defer repeat imaging to oncology given previous colon cancer which will require ongoing surveillance anyways  -Ancillary staff attempting to contact [facility] cardiology for preoperative clearance for general anesthesia, however patient was seen by [facility] cardiology on 3/21/2024 noting patient is moderate risk for MAC anesthesia--patient underwent colonoscopy by Dr. Keyes on 3/25/2024 without event  -Advised patient to remain active  -Immunizations reviewed, pneumococcal vaccination due at next visit (she is turning 64 later this month) --- needs PCV 20      Return in about 4 weeks (around 5/15/2024).      Zak Collado MD/MPH     Pulmonary, Critical Care Medicine  [facility]

## 2024-04-19 ENCOUNTER — CLINICAL DOCUMENTATION (OUTPATIENT)
Age: 64
End: 2024-04-19

## 2024-04-19 NOTE — PROGRESS NOTES
[facility] - Any [phone] said Dr. Zak Collado requested a medical clearance from them.  They are unable to do this, patient has not been seen at their office since 2021.

## 2024-04-22 ENCOUNTER — TELEPHONE (OUTPATIENT)
Age: 64
End: 2024-04-22

## 2024-04-22 NOTE — TELEPHONE ENCOUNTER
[facility] office called back. They need a clearance form form our office completed and faxed to them Attn Nila Hook with:  Name   ALLISON Ibarra doing the procedure: Zak Collado MD  Procedure to be done: EBUS  Place: [facility]  Date: to be determined  Type of anesthesia: General  This needs to be typed up and faxed to Nila [facility]ancourt [phone]

## 2024-04-22 NOTE — TELEPHONE ENCOUNTER
Contact patient to confirm her Ebus procedure on 05/27/24 arrival time at 5:30 and precedure time at 7:30AM [facility]. No labs prior to procedure. Patient needs to stop Xarelto 2 days prior to procedure date.  Patient knows about the preop procedure. [facility] will also call patient to go over this info as well. No other concerns at the moment

## 2024-04-29 NOTE — PERIOP NOTE
Instructions for your surgery at [facility]      Today's Date:  4/29/2024      Patient's Name:  Lorna Haro           Surgery Date:  05/07/2024              Please enter the main entrance of the hospital and check-in at the  located in the lobby. Once checked in at the , you will take the elevators to the second floor, and report to the waiting room on the left. The room will say Procedure Registration.    Do NOT eat or drink anything, including candy, gum, or ice chips after midnight prior to your surgery, unless you have specific instructions from your surgeon or anesthesia provider to do so.  Brush your teeth before coming to the hospital. You may swish with water, but do not swallow.  No smoking/Vaping/E-Cigarettes 24 hours prior to the day of surgery.  No alcohol 24 hours prior to the day of surgery.  No recreational drugs for one week prior to the day of surgery.  Bring Photo ID, Insurance information, and Co-pay if required on day of surgery.  Bring in pertinent legal documents, such as, Medical Power of , DNR, Advance Directive, etc.  Leave all valuables, including money/purse, at home.  Remove all jewelry, including ALL body piercings, nail polish, acrylic nails, and makeup (including mascara); no lotions, powders, deodorant, or perfume/cologne/after shave on the skin.  Follow instruction for Hibiclens washes and CHG wipes from surgeon's office.   Glasses and dentures may be worn to the hospital. They must be removed prior to surgery. Please bring case/container for glasses or dentures.   Contact lenses should not be worn on day of surgery.   Call your doctor's office if symptoms of a cold or illness develop within 24-48 hours prior to your surgery.  Call your doctor's office if you have any questions concerning insurance or co-pays.  15. AN ADULT (relative or friend 18 years or older) MUST DRIVE YOU HOME AFTER YOUR SURGERY.  16. Please make

## 2024-05-06 ENCOUNTER — ANESTHESIA EVENT (OUTPATIENT)
Facility: HOSPITAL | Age: 64
End: 2024-05-06
Payer: MEDICARE

## 2024-05-07 ENCOUNTER — APPOINTMENT (OUTPATIENT)
Facility: HOSPITAL | Age: 64
End: 2024-05-07
Attending: INTERNAL MEDICINE
Payer: MEDICARE

## 2024-05-07 ENCOUNTER — ANESTHESIA (OUTPATIENT)
Facility: HOSPITAL | Age: 64
End: 2024-05-07
Payer: MEDICARE

## 2024-05-07 ENCOUNTER — HOSPITAL ENCOUNTER (OUTPATIENT)
Facility: HOSPITAL | Age: 64
Setting detail: OUTPATIENT SURGERY
Discharge: HOME | End: 2024-05-07
Attending: INTERNAL MEDICINE | Admitting: INTERNAL MEDICINE
Payer: MEDICARE

## 2024-05-07 VITALS
HEART RATE: 92 BPM | WEIGHT: 179.6 LBS | BODY MASS INDEX: 28.87 KG/M2 | RESPIRATION RATE: 18 BRPM | HEIGHT: 66 IN | TEMPERATURE: 98.2 F | OXYGEN SATURATION: 94 % | DIASTOLIC BLOOD PRESSURE: 88 MMHG | SYSTOLIC BLOOD PRESSURE: 142 MMHG

## 2024-05-07 LAB
GLUCOSE BLD STRIP.AUTO-MCNC: 130 MG/DL (ref 70–110)
GLUCOSE BLD STRIP.AUTO-MCNC: 152 MG/DL (ref 70–110)

## 2024-05-07 PROCEDURE — 88172 CYTP DX EVAL FNA 1ST EA SITE: CPT

## 2024-05-07 PROCEDURE — 88184 FLOWCYTOMETRY/ TC 1 MARKER: CPT

## 2024-05-07 PROCEDURE — 3700000001 HC ADD 15 MINUTES (ANESTHESIA): Performed by: INTERNAL MEDICINE

## 2024-05-07 PROCEDURE — P9045 ALBUMIN (HUMAN), 5%, 250 ML: HCPCS | Performed by: NURSE ANESTHETIST, CERTIFIED REGISTERED

## 2024-05-07 PROCEDURE — 7100000000 HC PACU RECOVERY - FIRST 15 MIN: Performed by: INTERNAL MEDICINE

## 2024-05-07 PROCEDURE — 6360000002 HC RX W HCPCS: Performed by: NURSE ANESTHETIST, CERTIFIED REGISTERED

## 2024-05-07 PROCEDURE — 2580000003 HC RX 258: Performed by: NURSE ANESTHETIST, CERTIFIED REGISTERED

## 2024-05-07 PROCEDURE — 6370000000 HC RX 637 (ALT 250 FOR IP): Performed by: NURSE ANESTHETIST, CERTIFIED REGISTERED

## 2024-05-07 PROCEDURE — 3600007513: Performed by: INTERNAL MEDICINE

## 2024-05-07 PROCEDURE — 3700000000 HC ANESTHESIA ATTENDED CARE: Performed by: INTERNAL MEDICINE

## 2024-05-07 PROCEDURE — 88185 FLOWCYTOMETRY/TC ADD-ON: CPT

## 2024-05-07 PROCEDURE — 82962 GLUCOSE BLOOD TEST: CPT

## 2024-05-07 PROCEDURE — 6360000002 HC RX W HCPCS: Performed by: INTERNAL MEDICINE

## 2024-05-07 PROCEDURE — 7100000001 HC PACU RECOVERY - ADDTL 15 MIN: Performed by: INTERNAL MEDICINE

## 2024-05-07 PROCEDURE — 7100000010 HC PHASE II RECOVERY - FIRST 15 MIN: Performed by: INTERNAL MEDICINE

## 2024-05-07 PROCEDURE — 3600007503: Performed by: INTERNAL MEDICINE

## 2024-05-07 PROCEDURE — 7100000011 HC PHASE II RECOVERY - ADDTL 15 MIN: Performed by: INTERNAL MEDICINE

## 2024-05-07 PROCEDURE — 2720000010 HC SURG SUPPLY STERILE: Performed by: INTERNAL MEDICINE

## 2024-05-07 PROCEDURE — 71045 X-RAY EXAM CHEST 1 VIEW: CPT

## 2024-05-07 PROCEDURE — 2709999900 HC NON-CHARGEABLE SUPPLY: Performed by: INTERNAL MEDICINE

## 2024-05-07 PROCEDURE — 2500000003 HC RX 250 WO HCPCS: Performed by: NURSE ANESTHETIST, CERTIFIED REGISTERED

## 2024-05-07 PROCEDURE — 88305 TISSUE EXAM BY PATHOLOGIST: CPT

## 2024-05-07 PROCEDURE — 88177 CYTP FNA EVAL EA ADDL: CPT

## 2024-05-07 PROCEDURE — 88173 CYTOPATH EVAL FNA REPORT: CPT

## 2024-05-07 RX ORDER — LORAZEPAM 2 MG/ML
0.5 INJECTION INTRAMUSCULAR
Status: DISCONTINUED | OUTPATIENT
Start: 2024-05-07 | End: 2024-05-07 | Stop reason: HOSPADM

## 2024-05-07 RX ORDER — MIDAZOLAM HYDROCHLORIDE 1 MG/ML
INJECTION INTRAMUSCULAR; INTRAVENOUS PRN
Status: DISCONTINUED | OUTPATIENT
Start: 2024-05-07 | End: 2024-05-07 | Stop reason: SDUPTHER

## 2024-05-07 RX ORDER — HALOPERIDOL 5 MG/ML
INJECTION INTRAMUSCULAR
Status: COMPLETED
Start: 2024-05-07 | End: 2024-05-07

## 2024-05-07 RX ORDER — PROPOFOL 10 MG/ML
INJECTION, EMULSION INTRAVENOUS PRN
Status: DISCONTINUED | OUTPATIENT
Start: 2024-05-07 | End: 2024-05-07 | Stop reason: SDUPTHER

## 2024-05-07 RX ORDER — FENTANYL CITRATE 50 UG/ML
25 INJECTION, SOLUTION INTRAMUSCULAR; INTRAVENOUS EVERY 5 MIN PRN
Status: DISCONTINUED | OUTPATIENT
Start: 2024-05-07 | End: 2024-05-07 | Stop reason: HOSPADM

## 2024-05-07 RX ORDER — FENTANYL CITRATE 50 UG/ML
INJECTION, SOLUTION INTRAMUSCULAR; INTRAVENOUS PRN
Status: DISCONTINUED | OUTPATIENT
Start: 2024-05-07 | End: 2024-05-07 | Stop reason: SDUPTHER

## 2024-05-07 RX ORDER — LIDOCAINE HYDROCHLORIDE 40 MG/ML
SOLUTION TOPICAL PRN
Status: DISCONTINUED | OUTPATIENT
Start: 2024-05-07 | End: 2024-05-07 | Stop reason: SDUPTHER

## 2024-05-07 RX ORDER — ROCURONIUM BROMIDE 10 MG/ML
INJECTION, SOLUTION INTRAVENOUS PRN
Status: DISCONTINUED | OUTPATIENT
Start: 2024-05-07 | End: 2024-05-07 | Stop reason: SDUPTHER

## 2024-05-07 RX ORDER — DEXAMETHASONE SODIUM PHOSPHATE 4 MG/ML
INJECTION, SOLUTION INTRA-ARTICULAR; INTRALESIONAL; INTRAMUSCULAR; INTRAVENOUS; SOFT TISSUE PRN
Status: DISCONTINUED | OUTPATIENT
Start: 2024-05-07 | End: 2024-05-07 | Stop reason: SDUPTHER

## 2024-05-07 RX ORDER — DEXTROSE MONOHYDRATE 100 MG/ML
INJECTION, SOLUTION INTRAVENOUS CONTINUOUS PRN
Status: DISCONTINUED | OUTPATIENT
Start: 2024-05-07 | End: 2024-05-07 | Stop reason: HOSPADM

## 2024-05-07 RX ORDER — LABETALOL HYDROCHLORIDE 5 MG/ML
5 INJECTION, SOLUTION INTRAVENOUS
Status: DISCONTINUED | OUTPATIENT
Start: 2024-05-07 | End: 2024-05-07 | Stop reason: HOSPADM

## 2024-05-07 RX ORDER — SUCCINYLCHOLINE/SOD CL,ISO/PF 100 MG/5ML
SYRINGE (ML) INTRAVENOUS PRN
Status: DISCONTINUED | OUTPATIENT
Start: 2024-05-07 | End: 2024-05-07 | Stop reason: SDUPTHER

## 2024-05-07 RX ORDER — SODIUM CHLORIDE, SODIUM LACTATE, POTASSIUM CHLORIDE, CALCIUM CHLORIDE 600; 310; 30; 20 MG/100ML; MG/100ML; MG/100ML; MG/100ML
INJECTION, SOLUTION INTRAVENOUS CONTINUOUS
Status: DISCONTINUED | OUTPATIENT
Start: 2024-05-07 | End: 2024-05-07 | Stop reason: HOSPADM

## 2024-05-07 RX ORDER — ALBUMIN, HUMAN INJ 5% 5 %
SOLUTION INTRAVENOUS PRN
Status: DISCONTINUED | OUTPATIENT
Start: 2024-05-07 | End: 2024-05-07 | Stop reason: SDUPTHER

## 2024-05-07 RX ORDER — ONDANSETRON 2 MG/ML
4 INJECTION INTRAMUSCULAR; INTRAVENOUS
Status: DISCONTINUED | OUTPATIENT
Start: 2024-05-07 | End: 2024-05-07 | Stop reason: HOSPADM

## 2024-05-07 RX ORDER — LIDOCAINE HYDROCHLORIDE 10 MG/ML
1 INJECTION, SOLUTION EPIDURAL; INFILTRATION; INTRACAUDAL; PERINEURAL
Status: DISCONTINUED | OUTPATIENT
Start: 2024-05-07 | End: 2024-05-07 | Stop reason: HOSPADM

## 2024-05-07 RX ORDER — NALOXONE HYDROCHLORIDE 0.4 MG/ML
INJECTION, SOLUTION INTRAMUSCULAR; INTRAVENOUS; SUBCUTANEOUS PRN
Status: DISCONTINUED | OUTPATIENT
Start: 2024-05-07 | End: 2024-05-07 | Stop reason: HOSPADM

## 2024-05-07 RX ORDER — INSULIN LISPRO 100 [IU]/ML
0-15 INJECTION, SOLUTION INTRAVENOUS; SUBCUTANEOUS ONCE
Status: DISCONTINUED | OUTPATIENT
Start: 2024-05-07 | End: 2024-05-07 | Stop reason: HOSPADM

## 2024-05-07 RX ORDER — HALOPERIDOL 5 MG/ML
1 INJECTION INTRAMUSCULAR
Status: COMPLETED | OUTPATIENT
Start: 2024-05-07 | End: 2024-05-07

## 2024-05-07 RX ORDER — OXYCODONE HYDROCHLORIDE 5 MG/1
5 TABLET ORAL
Status: DISCONTINUED | OUTPATIENT
Start: 2024-05-07 | End: 2024-05-07 | Stop reason: HOSPADM

## 2024-05-07 RX ORDER — GLYCOPYRROLATE 0.2 MG/ML
INJECTION INTRAMUSCULAR; INTRAVENOUS PRN
Status: DISCONTINUED | OUTPATIENT
Start: 2024-05-07 | End: 2024-05-07 | Stop reason: SDUPTHER

## 2024-05-07 RX ORDER — ACETAMINOPHEN 500 MG
1000 TABLET ORAL ONCE
Status: COMPLETED | OUTPATIENT
Start: 2024-05-07 | End: 2024-05-07

## 2024-05-07 RX ORDER — ONDANSETRON 2 MG/ML
INJECTION INTRAMUSCULAR; INTRAVENOUS PRN
Status: DISCONTINUED | OUTPATIENT
Start: 2024-05-07 | End: 2024-05-07 | Stop reason: SDUPTHER

## 2024-05-07 RX ORDER — DIPHENHYDRAMINE HYDROCHLORIDE 50 MG/ML
12.5 INJECTION INTRAMUSCULAR; INTRAVENOUS
Status: DISCONTINUED | OUTPATIENT
Start: 2024-05-07 | End: 2024-05-07 | Stop reason: HOSPADM

## 2024-05-07 RX ORDER — SODIUM CHLORIDE 0.9 % (FLUSH) 0.9 %
5-40 SYRINGE (ML) INJECTION PRN
Status: DISCONTINUED | OUTPATIENT
Start: 2024-05-07 | End: 2024-05-07 | Stop reason: HOSPADM

## 2024-05-07 RX ORDER — PHENYLEPHRINE HYDROCHLORIDE 10 MG/ML
INJECTION INTRAVENOUS PRN
Status: DISCONTINUED | OUTPATIENT
Start: 2024-05-07 | End: 2024-05-07 | Stop reason: SDUPTHER

## 2024-05-07 RX ORDER — LIDOCAINE HYDROCHLORIDE 20 MG/ML
INJECTION, SOLUTION EPIDURAL; INFILTRATION; INTRACAUDAL; PERINEURAL PRN
Status: DISCONTINUED | OUTPATIENT
Start: 2024-05-07 | End: 2024-05-07 | Stop reason: SDUPTHER

## 2024-05-07 RX ORDER — ESMOLOL HYDROCHLORIDE 10 MG/ML
INJECTION INTRAVENOUS PRN
Status: DISCONTINUED | OUTPATIENT
Start: 2024-05-07 | End: 2024-05-07 | Stop reason: SDUPTHER

## 2024-05-07 RX ORDER — LABETALOL HYDROCHLORIDE 5 MG/ML
INJECTION, SOLUTION INTRAVENOUS PRN
Status: DISCONTINUED | OUTPATIENT
Start: 2024-05-07 | End: 2024-05-07 | Stop reason: SDUPTHER

## 2024-05-07 RX ORDER — FUROSEMIDE 10 MG/ML
40 INJECTION INTRAMUSCULAR; INTRAVENOUS ONCE
Status: COMPLETED | OUTPATIENT
Start: 2024-05-07 | End: 2024-05-07

## 2024-05-07 RX ORDER — METOPROLOL TARTRATE 1 MG/ML
INJECTION, SOLUTION INTRAVENOUS PRN
Status: DISCONTINUED | OUTPATIENT
Start: 2024-05-07 | End: 2024-05-07 | Stop reason: SDUPTHER

## 2024-05-07 RX ADMIN — MIDAZOLAM 2 MG: 1 INJECTION, SOLUTION INTRAMUSCULAR; INTRAVENOUS at 07:38

## 2024-05-07 RX ADMIN — LABETALOL HYDROCHLORIDE 5 MG: 5 INJECTION, SOLUTION INTRAVENOUS at 09:42

## 2024-05-07 RX ADMIN — FUROSEMIDE 40 MG: 10 INJECTION, SOLUTION INTRAMUSCULAR; INTRAVENOUS at 10:57

## 2024-05-07 RX ADMIN — SUGAMMADEX 200 MG: 100 INJECTION, SOLUTION INTRAVENOUS at 09:35

## 2024-05-07 RX ADMIN — HALOPERIDOL LACTATE 2 MG: 5 INJECTION, SOLUTION INTRAMUSCULAR at 10:01

## 2024-05-07 RX ADMIN — ONDANSETRON 4 MG: 2 INJECTION INTRAMUSCULAR; INTRAVENOUS at 09:24

## 2024-05-07 RX ADMIN — PHENYLEPHRINE HYDROCHLORIDE 100 MCG: 10 INJECTION INTRAVENOUS at 08:10

## 2024-05-07 RX ADMIN — LIDOCAINE HYDROCHLORIDE 40 MG: 40 SOLUTION TOPICAL at 07:45

## 2024-05-07 RX ADMIN — ACETAMINOPHEN 1000 MG: 500 TABLET ORAL at 06:41

## 2024-05-07 RX ADMIN — GLYCOPYRROLATE 0.2 MG: 0.2 INJECTION INTRAMUSCULAR; INTRAVENOUS at 07:38

## 2024-05-07 RX ADMIN — FENTANYL CITRATE 25 MCG: 50 INJECTION INTRAMUSCULAR; INTRAVENOUS at 08:20

## 2024-05-07 RX ADMIN — ROCURONIUM BROMIDE 40 MG: 50 INJECTION INTRAVENOUS at 07:47

## 2024-05-07 RX ADMIN — DEXAMETHASONE SODIUM PHOSPHATE 8 MG: 4 INJECTION INTRA-ARTICULAR; INTRALESIONAL; INTRAMUSCULAR; INTRAVENOUS; SOFT TISSUE at 07:42

## 2024-05-07 RX ADMIN — ONDANSETRON 4 MG: 2 INJECTION INTRAMUSCULAR; INTRAVENOUS at 07:38

## 2024-05-07 RX ADMIN — FENTANYL CITRATE 25 MCG: 50 INJECTION INTRAMUSCULAR; INTRAVENOUS at 07:46

## 2024-05-07 RX ADMIN — ESMOLOL HYDROCHLORIDE 30 MG: 10 INJECTION, SOLUTION INTRAVENOUS at 07:41

## 2024-05-07 RX ADMIN — FENTANYL CITRATE 50 MCG: 50 INJECTION INTRAMUSCULAR; INTRAVENOUS at 07:41

## 2024-05-07 RX ADMIN — SODIUM CHLORIDE, POTASSIUM CHLORIDE, SODIUM LACTATE AND CALCIUM CHLORIDE: 600; 310; 30; 20 INJECTION, SOLUTION INTRAVENOUS at 06:41

## 2024-05-07 RX ADMIN — PHENYLEPHRINE HYDROCHLORIDE 100 MCG: 10 INJECTION INTRAVENOUS at 08:29

## 2024-05-07 RX ADMIN — ROCURONIUM BROMIDE 10 MG: 50 INJECTION INTRAVENOUS at 07:41

## 2024-05-07 RX ADMIN — ALBUMIN (HUMAN) 12.5 G: 12.5 SOLUTION INTRAVENOUS at 07:50

## 2024-05-07 RX ADMIN — Medication 100 MG: at 07:41

## 2024-05-07 RX ADMIN — METOPROLOL TARTRATE 2.5 MG: 5 INJECTION INTRAVENOUS at 07:55

## 2024-05-07 RX ADMIN — PROPOFOL 120 MG: 10 INJECTION, EMULSION INTRAVENOUS at 07:41

## 2024-05-07 RX ADMIN — PROPOFOL 120 MCG/KG/MIN: 10 INJECTION, EMULSION INTRAVENOUS at 07:42

## 2024-05-07 RX ADMIN — LIDOCAINE HYDROCHLORIDE 100 MG: 20 INJECTION, SOLUTION EPIDURAL; INFILTRATION; INTRACAUDAL; PERINEURAL at 07:41

## 2024-05-07 RX ADMIN — METOPROLOL TARTRATE 2.5 MG: 5 INJECTION INTRAVENOUS at 07:57

## 2024-05-07 ASSESSMENT — PAIN - FUNCTIONAL ASSESSMENT: PAIN_FUNCTIONAL_ASSESSMENT: 0-10

## 2024-05-07 ASSESSMENT — PAIN SCALES - GENERAL
PAINLEVEL_OUTOF10: 0

## 2024-05-07 ASSESSMENT — ENCOUNTER SYMPTOMS: SHORTNESS OF BREATH: 1

## 2024-05-07 NOTE — H&P
Update History & Physical    The patient's History and Physical of April 17, 2024 was reviewed with the patient and I examined the patient. There was no change. The surgical site was confirmed by the patient and me.     I discussed PET imaging with patient.  I went over possible etiologies both benign and malignant and indicated that she is high risk and unfortunately with lymph node enlargement, malignancy needs to be ruled out.  We went over various methods of diagnosis from less invasive to more invasive - bronchoscopy with EBUS vs surgical approach, including risks and benefits of both (less invasive but possible nondiagnosis vs more invasive but improved diagnostic yield).  I went over the bronchoscopy with EBUS procedure including technique as well as risks of the procedure - bronchospasm/resp failure, pneumothorax, bleeding, nondiagnostic sampling as well as risks of general anesthesia including arrhythmia and death.  Pt was agreeable to attempt at diagnosis with bronchoscopy with EBUS.    Plan: The risks, benefits, expected outcome, and alternative to the recommended procedure have been discussed with the patient. Patient understands and wants to proceed with the procedure.     Electronically signed by Zak Collado MD on 5/7/2024 at 7:29 AM

## 2024-05-07 NOTE — PERIOP NOTE
Patient /Family /Designee has been informed that [facility] is not responsible for patient belongings per policy and the signed [facility] Patient Agreement document.  Personal items should be sent home or checked in with security.  Patient /Family /Designee selected the following action:                            [x]  Send personal items home with a family member or friend                                                 []  Check in personal items with security, excluding clothing                            []  Maintain personal items at the bedside, against recommendation                                 by Jericho Ladd [facility]                                   ** If patient /family /designee chooses to maintain personal items at the bedside,                                      Complete the patient belongings inventory in the EMR.

## 2024-05-07 NOTE — DISCHARGE INSTRUCTIONS
Bronchoscopy: What to Expect at Home  Your Recovery     Bronchoscopy lets your doctor look at your airway through a tube called a bronchoscope. Afterward, you may feel tired for 1 or 2 days. Your mouth may feel very dry for several hours after the procedure. You may also have a sore throat and a hoarse voice for a few days. Sucking on throat lozenges or gargling with warm salt water may help soothe your sore throat.  If a sample of tissue (biopsy) was taken, you may spit up a small amount of blood or have bloody saliva. This is normal.  Ask your doctor when you can drive again. Do not smoke for at least 24 hours.  This care sheet gives you a general idea about how long it will take for you to recover. But each person recovers at a different pace. Follow the steps below to get better as quickly as possible.  How can you care for yourself at home?  Activity    Do not eat anything for 2 hours after the procedure.     Rest when you feel tired. Getting enough sleep will help you recover.     Avoid strenuous activities, such as bicycle riding, jogging, weight lifting, or aerobic exercise, until your doctor says it is okay.     Ask your doctor when you can drive again.   Diet    You can eat your normal diet. If your stomach is upset, try bland, low-fat foods like plain rice, broiled chicken, toast, and yogurt.     If it is painful to swallow, start out with cold drinks, flavored ice pops, and ice cream. Next, try soft foods like pudding, yogurt, canned or cooked fruit, scrambled eggs, and mashed potatoes. Avoid eating hard or scratchy foods like chips or raw vegetables. Avoid orange or tomato juice and other acidic foods that can sting the throat.     Drink plenty of fluids to avoid becoming dehydrated (unless your doctor tells you not to).   Medicines    Take pain medicines exactly as directed.  If the doctor gave you a prescription medicine for pain, take it as prescribed.  If you are not taking a prescription pain 
Abdominal Pain, N/V/D

## 2024-05-07 NOTE — ANESTHESIA POSTPROCEDURE EVALUATION
Department of Anesthesiology  Postprocedure Note    Patient: Lorna Haro  MRN: [id number]  YOB: 1960  Date of evaluation: 5/7/2024    Procedure Summary       Date: 05/07/24 Room / Location: [facility] ENDO 01 / [facility] ENDOSCOPY    Anesthesia Start: 0738 Anesthesia Stop: 1001    Procedures:       BRONCHOSCOPY ENDOBRONCHIAL ULTRASOUND (Chest)      BRONCHOSCOPY (Chest) Diagnosis:       Mediastinal lymphadenopathy      Malignant neoplasm of colon, unspecified part of colon (HCC)      (Mediastinal lymphadenopathy [R59.0])      (Malignant neoplasm of colon, unspecified part of colon (HCC) [C18.9])    Surgeons: Zak Collado MD Responsible Provider: Jeremi Botello MD    Anesthesia Type: General ASA Status: 3            Anesthesia Type: General    Angelique Phase I: Angelique Score: 10    Angelique Phase II: Angelique Score: 10    Anesthesia Post Evaluation    Patient location during evaluation: bedside  Patient participation: complete - patient participated  Airway patency: patent  Cardiovascular status: hemodynamically stable  Respiratory status: acceptable  Hydration status: stable    No notable events documented.

## 2024-05-07 NOTE — PROCEDURES
[address]  [address]  [place]  [address]  [phone]    [facility]  Pulmonary, Critical Care, and Sleep Medicine          Name: Lorna Haro MRN: [id number]   : 1960 Hospital: [facility]   Date: 2024        Bronchoscopy with Endobronchial Ultrasound and Transbronchial Needle Aspiration Report    Procedure: Diagnostic bronchoscopy with biopsies including EBUS/TBNA    Indication:  Mediastinal and hilar lymphadenopathy     Consent/Treatment: Informed consent was obtained from the  patient after risks, benefits and alternatives were explained. Timeout verified the correct patient and correct procedure.     Anesthesia:   General anesthesia was performed by anesthesiology    Procedure Details:   -- The bronchoscope was introduced through an endotracheal tube.   -- The trachea and bárbara were completely inspected and found some mild deviation of the trachea from mass effect from the right lower paratracheal lymph node region, otherwise normal.  -- The right-sided endobronchial anatomy was completely inspected and was found to be structurally normal.  There was some engorgement near the region of the right lower paratracheal lymph node, otherwise structurally normal.  -- The left-sided endobronchial anatomy was completely inspected and was found to be normal  -- No endobronchial lesions were encountered  -- The flexible bronchoscope was removed and the endobronchial ultrasound scope was inserted into the endotracheal tube  -- The mediastinal and hilar lymph nodes were inspected showing lymphadenopathy at the following sites: Station 11 L (1-1.5 cm), station 7 (2.5-3.5 cm), station 4R (2-3 cm) station 4L (1-2 cm), station 11R (2-3 cm  -- Transbronchial needle aspiration using a 22gauge needle was then performed using ultrasound guidance to the following lymph nodes/sites: station 11L (4 passes - 1 to flow cytometry); station 7 (5 passes - 2 to flow cytometry);

## 2024-05-07 NOTE — ANESTHESIA PRE PROCEDURE
\"PHART\", \"PO2ART\", \"XPH0DJU\", \"FQF5JRN\", \"BEART\", \"Q6REYGEK\"     Type & Screen (If Applicable):  No results found for: \"LABABO\"    Drug/Infectious Status (If Applicable):  Lab Results   Component Value Date/Time    HEPCAB <0.1 08/28/2015 08:26 AM       COVID-19 Screening (If Applicable): No results found for: \"COVID19\"        Anesthesia Evaluation  Patient summary reviewed  Airway: Mallampati: II     Neck ROM: limited  Mouth opening: > = 3 FB   Dental:    (+) poor dentition      Pulmonary:   (+)   shortness of breath:   sleep apnea:   decreased breath sounds: bilateral                               Cardiovascular:    (+) hypertension:, pacemaker: pacemaker and AICD, CHF:, DOMINGUEZ:        Rhythm: irregular  Rate: normal                 ROS comment: medtronic     Neuro/Psych:               GI/Hepatic/Renal:   (+) GERD:          Endo/Other:    (+) DiabetesType II DM, hypothyroidism::..                 Abdominal:             Vascular:          Other Findings:       Anesthesia Plan      general     ASA 3       Induction: intravenous.      Anesthetic plan and risks discussed with patient.                    ES GABRIEL MD   5/7/2024

## 2024-05-09 ENCOUNTER — TELEPHONE (OUTPATIENT)
Age: 64
End: 2024-05-09

## 2024-05-09 NOTE — TELEPHONE ENCOUNTER
Attempted to call regarding EBUS results.  No Answer.  Will give results in followup on 5/15 as scheduled.        Zak Collado MD/MPH     Pulmonary, Critical Care Medicine  [facility]

## 2024-05-17 ENCOUNTER — TELEPHONE (OUTPATIENT)
Age: 64
End: 2024-05-17

## 2024-05-17 NOTE — TELEPHONE ENCOUNTER
Patient called stating that she did not realize her appt was scheduled for 5.15.24.  Pt daughter had all of patients paperwork when she was discharged from hospital.  Patient is requesting Dr Collado  give her a call regarding the results and to schedule an appt sooner than 10.28.24 which is his next available slot.  Contact#  [phone]

## 2024-05-21 LAB — HBA1C MFR BLD HPLC: 6.4 %

## 2024-06-14 ENCOUNTER — CLINICAL DOCUMENTATION (OUTPATIENT)
Age: 64
End: 2024-06-14

## 2024-06-24 ENCOUNTER — APPOINTMENT (OUTPATIENT)
Facility: HOSPITAL | Age: 64
End: 2024-06-24
Attending: STUDENT IN AN ORGANIZED HEALTH CARE EDUCATION/TRAINING PROGRAM
Payer: MEDICARE

## 2024-06-24 ENCOUNTER — HOSPITAL ENCOUNTER (EMERGENCY)
Facility: HOSPITAL | Age: 64
Discharge: HOME | End: 2024-06-24
Attending: STUDENT IN AN ORGANIZED HEALTH CARE EDUCATION/TRAINING PROGRAM
Payer: MEDICARE

## 2024-06-24 ENCOUNTER — APPOINTMENT (OUTPATIENT)
Facility: HOSPITAL | Age: 64
End: 2024-06-24
Payer: MEDICARE

## 2024-06-24 VITALS
SYSTOLIC BLOOD PRESSURE: 127 MMHG | WEIGHT: 169 LBS | OXYGEN SATURATION: 97 % | HEIGHT: 66 IN | RESPIRATION RATE: 17 BRPM | BODY MASS INDEX: 27.16 KG/M2 | DIASTOLIC BLOOD PRESSURE: 77 MMHG | HEART RATE: 80 BPM | TEMPERATURE: 97.8 F

## 2024-06-24 DIAGNOSIS — E87.6 HYPOKALEMIA: ICD-10-CM

## 2024-06-24 DIAGNOSIS — E86.0 DEHYDRATION: ICD-10-CM

## 2024-06-24 DIAGNOSIS — R55 SYNCOPE AND COLLAPSE: Primary | ICD-10-CM

## 2024-06-24 LAB
ALBUMIN SERPL-MCNC: 3.6 G/DL (ref 3.4–5)
ALBUMIN/GLOB SERPL: 1.1 (ref 0.8–1.7)
ALP SERPL-CCNC: 494 U/L (ref 45–117)
ALT SERPL-CCNC: 76 U/L (ref 13–56)
ANION GAP SERPL CALC-SCNC: 7 MMOL/L (ref 3–18)
AST SERPL-CCNC: 58 U/L (ref 10–38)
BASOPHILS # BLD: 0.1 K/UL (ref 0–0.1)
BASOPHILS NFR BLD: 1 % (ref 0–2)
BILIRUB SERPL-MCNC: 0.7 MG/DL (ref 0.2–1)
BUN SERPL-MCNC: 36 MG/DL (ref 7–18)
BUN/CREAT SERPL: 14 (ref 12–20)
CALCIUM SERPL-MCNC: 9.2 MG/DL (ref 8.5–10.1)
CHLORIDE SERPL-SCNC: 106 MMOL/L (ref 100–111)
CO2 SERPL-SCNC: 27 MMOL/L (ref 21–32)
CREAT SERPL-MCNC: 2.6 MG/DL (ref 0.6–1.3)
DIFFERENTIAL METHOD BLD: ABNORMAL
EKG ATRIAL RATE: 79 BPM
EKG DIAGNOSIS: NORMAL
EKG P AXIS: 31 DEGREES
EKG P-R INTERVAL: 176 MS
EKG Q-T INTERVAL: 440 MS
EKG QRS DURATION: 132 MS
EKG QTC CALCULATION (BAZETT): 504 MS
EKG R AXIS: -54 DEGREES
EKG T AXIS: 65 DEGREES
EKG VENTRICULAR RATE: 79 BPM
EOSINOPHIL # BLD: 0.2 K/UL (ref 0–0.4)
EOSINOPHIL NFR BLD: 2 % (ref 0–5)
ERYTHROCYTE [DISTWIDTH] IN BLOOD BY AUTOMATED COUNT: 14.8 % (ref 11.6–14.5)
GLOBULIN SER CALC-MCNC: 3.2 G/DL (ref 2–4)
GLUCOSE BLD STRIP.AUTO-MCNC: 130 MG/DL (ref 70–110)
GLUCOSE SERPL-MCNC: 159 MG/DL (ref 74–99)
HCT VFR BLD AUTO: 37.9 % (ref 35–45)
HGB BLD-MCNC: 13 G/DL (ref 12–16)
IMM GRANULOCYTES # BLD AUTO: 0 K/UL (ref 0–0.04)
IMM GRANULOCYTES NFR BLD AUTO: 0 % (ref 0–0.5)
LYMPHOCYTES # BLD: 2 K/UL (ref 0.9–3.6)
LYMPHOCYTES NFR BLD: 27 % (ref 21–52)
MAGNESIUM SERPL-MCNC: 1.7 MG/DL (ref 1.6–2.6)
MCH RBC QN AUTO: 31.9 PG (ref 24–34)
MCHC RBC AUTO-ENTMCNC: 34.3 G/DL (ref 31–37)
MCV RBC AUTO: 93.1 FL (ref 78–100)
MONOCYTES # BLD: 0.5 K/UL (ref 0.05–1.2)
MONOCYTES NFR BLD: 7 % (ref 3–10)
NEUTS SEG # BLD: 4.8 K/UL (ref 1.8–8)
NEUTS SEG NFR BLD: 64 % (ref 40–73)
NRBC # BLD: 0 K/UL (ref 0–0.01)
NRBC BLD-RTO: 0 PER 100 WBC
NT PRO BNP: 1567 PG/ML (ref 0–900)
PLATELET # BLD AUTO: 229 K/UL (ref 135–420)
PMV BLD AUTO: 10.5 FL (ref 9.2–11.8)
POTASSIUM SERPL-SCNC: 3 MMOL/L (ref 3.5–5.5)
PROT SERPL-MCNC: 6.8 G/DL (ref 6.4–8.2)
RBC # BLD AUTO: 4.07 M/UL (ref 4.2–5.3)
SODIUM SERPL-SCNC: 140 MMOL/L (ref 136–145)
TROPONIN I SERPL HS-MCNC: 28 NG/L (ref 0–54)
TROPONIN I SERPL HS-MCNC: 29 NG/L (ref 0–54)
WBC # BLD AUTO: 7.5 K/UL (ref 4.6–13.2)

## 2024-06-24 PROCEDURE — 96365 THER/PROPH/DIAG IV INF INIT: CPT

## 2024-06-24 PROCEDURE — 83880 ASSAY OF NATRIURETIC PEPTIDE: CPT

## 2024-06-24 PROCEDURE — 6360000002 HC RX W HCPCS

## 2024-06-24 PROCEDURE — 93005 ELECTROCARDIOGRAM TRACING: CPT | Performed by: STUDENT IN AN ORGANIZED HEALTH CARE EDUCATION/TRAINING PROGRAM

## 2024-06-24 PROCEDURE — 83735 ASSAY OF MAGNESIUM: CPT

## 2024-06-24 PROCEDURE — 84484 ASSAY OF TROPONIN QUANT: CPT

## 2024-06-24 PROCEDURE — 2580000003 HC RX 258: Performed by: STUDENT IN AN ORGANIZED HEALTH CARE EDUCATION/TRAINING PROGRAM

## 2024-06-24 PROCEDURE — 96360 HYDRATION IV INFUSION INIT: CPT

## 2024-06-24 PROCEDURE — 85025 COMPLETE CBC W/AUTO DIFF WBC: CPT

## 2024-06-24 PROCEDURE — 70450 CT HEAD/BRAIN W/O DYE: CPT

## 2024-06-24 PROCEDURE — 71045 X-RAY EXAM CHEST 1 VIEW: CPT

## 2024-06-24 PROCEDURE — 82962 GLUCOSE BLOOD TEST: CPT

## 2024-06-24 PROCEDURE — 93010 ELECTROCARDIOGRAM REPORT: CPT | Performed by: INTERNAL MEDICINE

## 2024-06-24 PROCEDURE — 96361 HYDRATE IV INFUSION ADD-ON: CPT

## 2024-06-24 PROCEDURE — 96366 THER/PROPH/DIAG IV INF ADDON: CPT

## 2024-06-24 PROCEDURE — 6370000000 HC RX 637 (ALT 250 FOR IP)

## 2024-06-24 PROCEDURE — 99285 EMERGENCY DEPT VISIT HI MDM: CPT

## 2024-06-24 PROCEDURE — 80053 COMPREHEN METABOLIC PANEL: CPT

## 2024-06-24 RX ORDER — LANOLIN ALCOHOL/MO/W.PET/CERES
400 CREAM (GRAM) TOPICAL DAILY
Status: DISCONTINUED | OUTPATIENT
Start: 2024-06-24 | End: 2024-06-24 | Stop reason: HOSPADM

## 2024-06-24 RX ORDER — POTASSIUM CHLORIDE 7.45 MG/ML
10 INJECTION INTRAVENOUS ONCE
Status: COMPLETED | OUTPATIENT
Start: 2024-06-24 | End: 2024-06-24

## 2024-06-24 RX ORDER — 0.9 % SODIUM CHLORIDE 0.9 %
1000 INTRAVENOUS SOLUTION INTRAVENOUS ONCE
Status: DISCONTINUED | OUTPATIENT
Start: 2024-06-24 | End: 2024-06-24

## 2024-06-24 RX ORDER — 0.9 % SODIUM CHLORIDE 0.9 %
500 INTRAVENOUS SOLUTION INTRAVENOUS ONCE
Status: COMPLETED | OUTPATIENT
Start: 2024-06-24 | End: 2024-06-24

## 2024-06-24 RX ORDER — DIPHENHYDRAMINE HCL 25 MG
25 CAPSULE ORAL
Status: COMPLETED | OUTPATIENT
Start: 2024-06-24 | End: 2024-06-24

## 2024-06-24 RX ORDER — ACETAMINOPHEN 325 MG/1
650 TABLET ORAL
Status: COMPLETED | OUTPATIENT
Start: 2024-06-24 | End: 2024-06-24

## 2024-06-24 RX ORDER — POTASSIUM CHLORIDE 20 MEQ/1
40 TABLET, EXTENDED RELEASE ORAL
Status: COMPLETED | OUTPATIENT
Start: 2024-06-24 | End: 2024-06-24

## 2024-06-24 RX ADMIN — ACETAMINOPHEN 650 MG: 325 TABLET ORAL at 17:25

## 2024-06-24 RX ADMIN — DIPHENHYDRAMINE HYDROCHLORIDE 25 MG: 25 CAPSULE ORAL at 17:24

## 2024-06-24 RX ADMIN — POTASSIUM CHLORIDE 10 MEQ: 7.46 INJECTION, SOLUTION INTRAVENOUS at 16:43

## 2024-06-24 RX ADMIN — POTASSIUM CHLORIDE 40 MEQ: 1500 TABLET, EXTENDED RELEASE ORAL at 16:43

## 2024-06-24 RX ADMIN — SODIUM CHLORIDE 500 ML: 9 INJECTION, SOLUTION INTRAVENOUS at 16:05

## 2024-06-24 RX ADMIN — Medication 400 MG: at 17:25

## 2024-06-24 ASSESSMENT — PAIN - FUNCTIONAL ASSESSMENT
PAIN_FUNCTIONAL_ASSESSMENT: NONE - DENIES PAIN
PAIN_FUNCTIONAL_ASSESSMENT: 0-10

## 2024-06-24 ASSESSMENT — LIFESTYLE VARIABLES
HOW OFTEN DO YOU HAVE A DRINK CONTAINING ALCOHOL: NEVER
HOW MANY STANDARD DRINKS CONTAINING ALCOHOL DO YOU HAVE ON A TYPICAL DAY: PATIENT DOES NOT DRINK

## 2024-06-24 ASSESSMENT — PAIN SCALES - GENERAL
PAINLEVEL_OUTOF10: 6
PAINLEVEL_OUTOF10: 5

## 2024-06-24 ASSESSMENT — PAIN DESCRIPTION - LOCATION: LOCATION: HEAD

## 2024-06-24 NOTE — ED PROVIDER NOTES
EMERGENCY DEPARTMENT HISTORY AND PHYSICAL EXAM      Date: 6/24/2024  Patient Name: Lorna Haro    History of Presenting Illness     Chief Complaint   Patient presents with    Loss of Consciousness    Head Injury       History (Context): Lorna Haro is a 64 y.o. female  pmh T2D, CHF, Afib, LBBB, HTN, multiple cancers in remission presents to the ED today with chief complaint of syncope and hitting her head after. She was getting up from the recliner to go to the bathroom, became dizzy, turned around quickly, passed out for a few seconds, then fell and hit her head left frontotemporal area resulting in a nosebleed. She thinks her BG was low, as she is taking insulins with BG ranging . Her  gave her 2 tsp sugar and felt better afterwards. She takes trulicity (due today but has not yet taken), novolog, and basaglar 15 U. She did not take her insulins yesterday. She endorses decreased appetite lately but ate well this AM. Endorses a headache, feels dehydrated as she states she did not drink enough water today. She felt hot with sweats and dizziness before LOC. She denies palpitations, strenuous exercise, being outdoors. She endorses nausea and diarrhea, but no vomiting/edema/sob/cough/weakness/numbness/change in vision or hearing. States this has happened in the past--Feb 2024 which was hypoglycemic in nature.       PCP: Dian De La Cruz MD    Current Facility-Administered Medications   Medication Dose Route Frequency Provider Last Rate Last Admin    sodium chloride 0.9 % bolus 500 mL  500 mL IntraVENous Once Ara Rosario .9 mL/hr at 06/24/24 1605 500 mL at 06/24/24 1605    magnesium oxide (MAG-OX) tablet 400 mg  400 mg Oral Daily Paris Gaspar MD   400 mg at 06/24/24 1725     Current Outpatient Medications   Medication Sig Dispense Refill    atorvastatin (LIPITOR) 20 MG tablet Take 1 tablet by mouth daily      Continuous Blood Gluc Sensor (FREESTYLE [place] 3 SENSOR) MISC APPLY 1

## 2024-06-24 NOTE — ED TRIAGE NOTES
Pt states she thinks her blood sugar bottomed out earlier. States she became hot and passed out and hit her head. States her  gave her a teaspoon of sugar. Pt is awake, alert and oriented. Pt states she has been taking Insulin for less than a year.

## 2024-06-24 NOTE — FLOWSHEET NOTE
06/24/24 1600   Vital Signs   Orthostatic B/P and Pulse? Yes   Blood Pressure Lying 124/65   Pulse Lying 75 PER MINUTE   Blood Pressure Sitting 114/71   Pulse Sitting 77 PER MINUTE   Blood Pressure Standing 89/62   Pulse Standing 84 PER MINUTE   Oxygen Therapy   SpO2 99 %

## 2024-07-25 NOTE — TELEPHONE ENCOUNTER
I certify I provided patient choice and a list to the patient/family of [facility] company. Patient/Family signed Patient's Choice Disclosure Form choosing the following  1.[facility]       PA is required for Pantoprazole 40mg    Cover My Meds KeyGalen Friendly Key: [id number] - PA Case ID: [id number]

## 2024-10-17 ENCOUNTER — TELEPHONE (OUTPATIENT)
Facility: CLINIC | Age: 64
End: 2024-10-17

## 2024-10-17 RX ORDER — ATORVASTATIN CALCIUM 20 MG/1
20 TABLET, FILM COATED ORAL DAILY
Qty: 90 TABLET | Refills: 0 | Status: SHIPPED | OUTPATIENT
Start: 2024-10-17

## 2024-10-17 NOTE — TELEPHONE ENCOUNTER
Refill   Lipitor             Pt said she has changed pharmacy she now uses   [facility]

## 2024-12-07 DIAGNOSIS — E03.9 HYPOTHYROIDISM, UNSPECIFIED TYPE: ICD-10-CM

## 2024-12-09 NOTE — TELEPHONE ENCOUNTER
Last OV: 11/27/2023  No future appointment scheduled  Last refill:   Atorvastatin -- 10/17/2024    Prevacid -- 3/26/2024  Levothyroxine -- 9/19/2023

## 2024-12-13 DIAGNOSIS — E03.9 HYPOTHYROIDISM, UNSPECIFIED TYPE: ICD-10-CM

## 2024-12-13 RX ORDER — LANSOPRAZOLE 30 MG/1
CAPSULE, DELAYED RELEASE ORAL
Qty: 90 CAPSULE | OUTPATIENT
Start: 2024-12-13

## 2024-12-13 RX ORDER — LEVOTHYROXINE SODIUM 112 UG/1
112 TABLET ORAL DAILY
Qty: 90 TABLET | Refills: 2 | OUTPATIENT
Start: 2024-12-13

## 2024-12-13 RX ORDER — LEVOTHYROXINE SODIUM 112 UG/1
112 TABLET ORAL DAILY
Qty: 90 TABLET | Refills: 0 | Status: SHIPPED | OUTPATIENT
Start: 2024-12-13

## 2024-12-13 RX ORDER — LANSOPRAZOLE 30 MG/1
CAPSULE, DELAYED RELEASE ORAL
Qty: 90 CAPSULE | Refills: 0 | Status: SHIPPED | OUTPATIENT
Start: 2024-12-13

## 2024-12-13 RX ORDER — ATORVASTATIN CALCIUM 20 MG/1
20 TABLET, FILM COATED ORAL DAILY
Qty: 90 TABLET | Refills: 0 | OUTPATIENT
Start: 2024-12-13

## 2024-12-13 RX ORDER — ATORVASTATIN CALCIUM 20 MG/1
20 TABLET, FILM COATED ORAL DAILY
Qty: 90 TABLET | Refills: 0 | Status: SHIPPED | OUTPATIENT
Start: 2024-12-13

## 2024-12-13 NOTE — TELEPHONE ENCOUNTER
Please schedule her for a follow-up visit with me in January or February.  First available 40-minute appointment is needed.    Dr. Dian De La Cruz  [facility] [address], [facility] [address]  [place] [address]  Phone: [phone]  Fax: [phone]

## 2024-12-13 NOTE — TELEPHONE ENCOUNTER
Last OV: 11/27/23  Next OV: no scheduled OV  Last RX: 03/26/24- lansoprazole                  09/19/23- Synthroid                   10/17/24- atorvastatin      Rcvd fax request for refills of the above medications      DERRICK Rosas LPN

## 2024-12-17 RX ORDER — LEVOTHYROXINE SODIUM 112 UG/1
112 TABLET ORAL DAILY
Qty: 90 TABLET | Refills: 0 | Status: SHIPPED | OUTPATIENT
Start: 2024-12-17

## 2025-02-07 LAB
ALBUMIN/CREAT UR: 60 MG/G CREAT (ref 0–29)
BASOPHILS # BLD AUTO: 0.1 X10E3/UL (ref 0–0.2)
BASOPHILS NFR BLD AUTO: 1 %
CREAT UR-MCNC: 97.2 MG/DL
CRP SERPL-MCNC: 5 MG/L (ref 0–10)
EOSINOPHIL # BLD AUTO: 0.2 X10E3/UL (ref 0–0.4)
EOSINOPHIL NFR BLD AUTO: 3 %
ERYTHROCYTE [DISTWIDTH] IN BLOOD BY AUTOMATED COUNT: 14.4 % (ref 11.7–15.4)
HBA1C MFR BLD: 6.6 % (ref 4.8–5.6)
HCT VFR BLD AUTO: 41.7 % (ref 34–46.6)
HGB BLD-MCNC: 13.8 G/DL (ref 11.1–15.9)
IMM GRANULOCYTES # BLD AUTO: 0 X10E3/UL (ref 0–0.1)
IMM GRANULOCYTES NFR BLD AUTO: 0 %
LYMPHOCYTES # BLD AUTO: 1.5 X10E3/UL (ref 0.7–3.1)
LYMPHOCYTES NFR BLD AUTO: 24 %
MCH RBC QN AUTO: 31.4 PG (ref 26.6–33)
MCHC RBC AUTO-ENTMCNC: 33.1 G/DL (ref 31.5–35.7)
MCV RBC AUTO: 95 FL (ref 79–97)
MICROALBUMIN UR-MCNC: 58.8 UG/ML
MONOCYTES # BLD AUTO: 0.4 X10E3/UL (ref 0.1–0.9)
MONOCYTES NFR BLD AUTO: 7 %
NEUTROPHILS # BLD AUTO: 4 X10E3/UL (ref 1.4–7)
NEUTROPHILS NFR BLD AUTO: 65 %
PLATELET # BLD AUTO: 258 X10E3/UL (ref 150–450)
RBC # BLD AUTO: 4.39 X10E6/UL (ref 3.77–5.28)
SPECIMEN STATUS REPORT: NORMAL
T4 FREE SERPL-MCNC: 1.59 NG/DL (ref 0.82–1.77)
TSH SERPL DL<=0.005 MIU/L-ACNC: 3.55 UIU/ML (ref 0.45–4.5)
WBC # BLD AUTO: 6.2 X10E3/UL (ref 3.4–10.8)

## 2025-02-08 LAB
ALBUMIN SERPL-MCNC: 4.1 G/DL (ref 3.9–4.9)
ALP SERPL-CCNC: 545 IU/L (ref 44–121)
ALT SERPL-CCNC: 42 IU/L (ref 0–32)
AST SERPL-CCNC: 39 IU/L (ref 0–40)
BILIRUB SERPL-MCNC: 0.6 MG/DL (ref 0–1.2)
BUN SERPL-MCNC: 43 MG/DL (ref 8–27)
BUN/CREAT SERPL: 18 (ref 12–28)
CALCIUM SERPL-MCNC: 10 MG/DL (ref 8.7–10.3)
CHLORIDE SERPL-SCNC: 100 MMOL/L (ref 96–106)
CO2 SERPL-SCNC: 18 MMOL/L (ref 20–29)
CREAT SERPL-MCNC: 2.38 MG/DL (ref 0.57–1)
EGFRCR SERPLBLD CKD-EPI 2021: 22 ML/MIN/1.73
GLOBULIN SER CALC-MCNC: 1.8 G/DL (ref 1.5–4.5)
GLUCOSE SERPL-MCNC: 131 MG/DL (ref 70–99)
POTASSIUM SERPL-SCNC: 4.4 MMOL/L (ref 3.5–5.2)
PROT SERPL-MCNC: 5.9 G/DL (ref 6–8.5)
SODIUM SERPL-SCNC: 143 MMOL/L (ref 134–144)

## 2025-02-13 ENCOUNTER — OFFICE VISIT (OUTPATIENT)
Facility: CLINIC | Age: 65
End: 2025-02-13

## 2025-02-13 VITALS
RESPIRATION RATE: 18 BRPM | DIASTOLIC BLOOD PRESSURE: 80 MMHG | HEIGHT: 66 IN | WEIGHT: 177.4 LBS | SYSTOLIC BLOOD PRESSURE: 134 MMHG | BODY MASS INDEX: 28.51 KG/M2 | OXYGEN SATURATION: 98 % | HEART RATE: 88 BPM | TEMPERATURE: 98.6 F

## 2025-02-13 DIAGNOSIS — N18.4 CHRONIC KIDNEY DISEASE, STAGE 4 (SEVERE) (HCC): ICD-10-CM

## 2025-02-13 DIAGNOSIS — Z85.038 HISTORY OF COLON CANCER: ICD-10-CM

## 2025-02-13 DIAGNOSIS — R74.8 ALKALINE PHOSPHATASE ELEVATION: Primary | ICD-10-CM

## 2025-02-13 DIAGNOSIS — E78.5 HYPERLIPIDEMIA, UNSPECIFIED HYPERLIPIDEMIA TYPE: ICD-10-CM

## 2025-02-13 DIAGNOSIS — Z71.89 ADVANCE CARE PLANNING: ICD-10-CM

## 2025-02-13 DIAGNOSIS — Z87.39 HISTORY OF GOUT: ICD-10-CM

## 2025-02-13 DIAGNOSIS — E03.9 HYPOTHYROIDISM, UNSPECIFIED TYPE: ICD-10-CM

## 2025-02-13 DIAGNOSIS — E11.22 TYPE 2 DIABETES MELLITUS WITH CHRONIC KIDNEY DISEASE, WITHOUT LONG-TERM CURRENT USE OF INSULIN, UNSPECIFIED CKD STAGE (HCC): ICD-10-CM

## 2025-02-13 DIAGNOSIS — E03.9 ACQUIRED HYPOTHYROIDISM: ICD-10-CM

## 2025-02-13 DIAGNOSIS — K76.9 LIVER DISEASE: ICD-10-CM

## 2025-02-13 DIAGNOSIS — Z85.528 HISTORY OF KIDNEY CANCER: ICD-10-CM

## 2025-02-13 DIAGNOSIS — I10 HYPERTENSION, UNSPECIFIED TYPE: ICD-10-CM

## 2025-02-13 DIAGNOSIS — Z00.00 WELCOME TO MEDICARE PREVENTIVE VISIT: ICD-10-CM

## 2025-02-13 RX ORDER — INSULIN GLARGINE 100 [IU]/ML
0-8 INJECTION, SOLUTION SUBCUTANEOUS NIGHTLY PRN
Qty: 15 ML | Refills: 5
Start: 2025-02-13

## 2025-02-13 RX ORDER — DULAGLUTIDE 1.5 MG/.5ML
1.5 INJECTION, SOLUTION SUBCUTANEOUS WEEKLY
COMMUNITY
End: 2025-10-02

## 2025-02-13 SDOH — ECONOMIC STABILITY: FOOD INSECURITY: WITHIN THE PAST 12 MONTHS, THE FOOD YOU BOUGHT JUST DIDN'T LAST AND YOU DIDN'T HAVE MONEY TO GET MORE.: NEVER TRUE

## 2025-02-13 SDOH — ECONOMIC STABILITY: FOOD INSECURITY: WITHIN THE PAST 12 MONTHS, YOU WORRIED THAT YOUR FOOD WOULD RUN OUT BEFORE YOU GOT MONEY TO BUY MORE.: NEVER TRUE

## 2025-02-13 ASSESSMENT — PATIENT HEALTH QUESTIONNAIRE - PHQ9
SUM OF ALL RESPONSES TO PHQ9 QUESTIONS 1 & 2: 0
SUM OF ALL RESPONSES TO PHQ QUESTIONS 1-9: 0
1. LITTLE INTEREST OR PLEASURE IN DOING THINGS: NOT AT ALL
2. FEELING DOWN, DEPRESSED OR HOPELESS: NOT AT ALL

## 2025-02-13 ASSESSMENT — LIFESTYLE VARIABLES
HOW MANY STANDARD DRINKS CONTAINING ALCOHOL DO YOU HAVE ON A TYPICAL DAY: PATIENT DOES NOT DRINK
HOW OFTEN DO YOU HAVE A DRINK CONTAINING ALCOHOL: NEVER

## 2025-02-13 NOTE — PATIENT INSTRUCTIONS
Latest Reference Range & Units 06/24/24 15:48 06/24/24 17:09 02/06/25 00:00   Sodium 134 - 144 mmol/L 140  143   Potassium 3.5 - 5.2 mmol/L 3.0 (L)  4.4   Chloride 96 - 106 mmol/L 106  100   CARBON DIOXIDE 20 - 29 mmol/L 27  18 (L)   BUN,BUNPL 8 - 27 mg/dL 36 (H)  43 (H)   Creatinine 0.57 - 1.00 mg/dL 2.60 (H)  2.38 (H)   Bun/Cre 12 - 28  14  18   Anion Gap 3.0 - 18 mmol/L 7     Est, Glom Filt Rate >59 mL/min/1.73 20 (L)  22 (L)   Magnesium 1.6 - 2.6 mg/dL 1.7     Glucose 70 - 99 mg/dL 159 (H)  131 (H)   Calcium 8.7 - 10.3 mg/dL 9.2  10.0   Albumin/Globulin Ratio 0.8 - 1.7   1.1     Total Protein 6.0 - 8.5 g/dL 6.8  5.9 (L)   Globulin, Total 1.5 - 4.5 g/dL   1.8   NT Pro-BNP 0 - 900 PG/ML 1,567 (H)     CRP 0 - 10 mg/L   5   Troponin, High Sensitivity 0 - 54 ng/L 28 29    Albumin 3.9 - 4.9 g/dL 3.6  4.1   Globulin 2.0 - 4.0 g/dL 3.2     ALBUMIN/CREAT RATIO, EXTERNAL 0 - 29 mg/g creat   60 (H)   Alkaline Phosphatase 44 - 121 IU/L 494 (H)  545 (H)   ALT 0 - 32 IU/L 76 (H)  42 (H)   AST 0 - 40 IU/L 58 (H)  39   Total Bilirubin 0.0 - 1.2 mg/dL 0.7  0.6   Hemoglobin A1C 4.8 - 5.6 %   6.6 (H)   TSH, 3rd Generation 0.450 - 4.500 uIU/mL   3.550   T4 Free 0.82 - 1.77 ng/dL   1.59   WBC 3.4 - 10.8 x10E3/uL 7.5  6.2   RBC 3.77 - 5.28 x10E6/uL 4.07 (L)  4.39   Hemoglobin Quant 11.1 - 15.9 g/dL 13.0  13.8   Hematocrit 34.0 - 46.6 % 37.9  41.7   MCV 79 - 97 fL 93.1  95   MCH 26.6 - 33.0 pg 31.9  31.4   MCHC 31.5 - 35.7 g/dL 34.3  33.1   MPV 9.2 - 11.8 FL 10.5     RDW 11.7 - 15.4 % 14.8 (H)  14.4   Platelet Count 150 - 450 x10E3/uL 229  258   Neutrophils % Not Estab. % 64  65   Lymphocyte % Not Estab. % 27  24   Monocytes % Not Estab. % 7  7   Eosinophils % Not Estab. % 2  3   Basophils % Not Estab. % 1  1   Neutrophils Absolute 1.4 - 7.0 x10E3/uL 4.8  4.0   Lymphocytes Absolute 0.7 - 3.1 x10E3/uL 2.0  1.5   Monocytes Absolute 0.1 - 0.9 x10E3/uL 0.5  0.4   Eosinophils Absolute 0.0 - 0.4 x10E3/uL 0.2  0.2   Basophils Absolute 0.0 -

## 2025-02-13 NOTE — PROGRESS NOTES
[facility]:  2/19/2025, MRN: [id number]      Lorna Haro is a 64 y.o. female and presents to clinic for Medicare AW      Subjective:    The patient is a 64-year-old female with history of right sided breast cancer s/p right mastectomy, colon cancer (2019) s/p surgery, b/l clear cell carcinoma s/p b/l partial nephrectomies, pulmonary nodule, chemotherapy induced CMO/CHF, osteopenia, CKD (followed by ), type 2 DM, suspected GALLO, multinodular goiter treated with a thyroidectomy, Covid-19 infection,  pacemaker placement (2021), postoperative hypothyroidism, HLD, GERD, vitamin D deficiency, and HTN     1. Fatigue and Hypothyroidism: No CP, SOB, or palpitations. Present x 6 months. 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  On Synthroid 112mcg daily. Her February labs show normal TFTs. +CBC.    2. Type 2 DM/HLD: Followed by Endocrinology. Next apt: next month. She was placed on Trulicity 1.5mg weekly. On lantus 0-8 units per daily if her Bgs are elevated. She had low Bgs on lantus w/I the past year. CGM: Freestyle avery 3. Recent labs show that her A1C is 6.6.  On zetia 10mg daily and lipitor 20mg daily. No side effects.    3. Cancers (Colon, Breast, Renal): In remission. She is to rtc with [facility] in 9 months. S/p a PET scan last year.    3/22/24 PET scan: Interval increased intensity of uptake consistent with progression of disease in mediastinal, bilateral hilar, and retroperitoneal lymphadenopathy     4. CKD and HTN: BP is 134/80. Taking: coreg 25mg bid, losartan 25mg daily, imdur 30mg daily, and bumex 1mg daily. Her next apt with Nephrology: May.      Latest Reference Range & Units 06/24/24 15:48 02/06/25 00:00   BUN,BUNPL 8 - 27 mg/dL 36 (H) 43 (H)   Creatinine 0.57 - 1.00 mg/dL 2.60 (H) 2.38 (H)   Bun/Cre 12 - 28  14 18   Anion Gap 3.0 - 18 mmol/L 7    Est, Glom Filt Rate >59 mL/min/1.73 20 (L) 22 (L)   (H): Data is abnormally

## 2025-02-20 NOTE — ACP (ADVANCE CARE PLANNING)
Advance Care Planning     General Advance Care Planning (ACP) Conversation    Date of Conversation: 2/13/25    Conducted with: Patient with Decision Making Capacity    Healthcare Decision Maker:  Today we documented Decision Maker(s) consistent with Legal Next of Kin hierarchy.    Content/Action Overview:  Has NO ACP documents-Information provided    Her  is to be her primary POA. She is not sure who she wants to be her successor POA. She has 3 kids. I encouraged her to complete her advance directive.    Length of Voluntary ACP Conversation in minutes:  <16 minutes (Non-Billable)    Dian De La Cruz MD

## 2025-03-13 ENCOUNTER — HOSPITAL ENCOUNTER (OUTPATIENT)
Facility: HOSPITAL | Age: 65
Discharge: HOME | End: 2025-03-16
Payer: MEDICARE

## 2025-03-13 ENCOUNTER — OFFICE VISIT (OUTPATIENT)
Facility: CLINIC | Age: 65
End: 2025-03-13
Payer: MEDICARE

## 2025-03-13 VITALS
HEART RATE: 105 BPM | RESPIRATION RATE: 17 BRPM | SYSTOLIC BLOOD PRESSURE: 108 MMHG | HEIGHT: 66 IN | DIASTOLIC BLOOD PRESSURE: 76 MMHG | WEIGHT: 175.8 LBS | TEMPERATURE: 98.4 F | BODY MASS INDEX: 28.25 KG/M2 | OXYGEN SATURATION: 99 %

## 2025-03-13 DIAGNOSIS — M25.562 ACUTE PAIN OF LEFT KNEE: ICD-10-CM

## 2025-03-13 DIAGNOSIS — M25.562 ACUTE PAIN OF LEFT KNEE: Primary | ICD-10-CM

## 2025-03-13 PROCEDURE — 73562 X-RAY EXAM OF KNEE 3: CPT

## 2025-03-13 PROCEDURE — 99214 OFFICE O/P EST MOD 30 MIN: CPT | Performed by: INTERNAL MEDICINE

## 2025-03-13 PROCEDURE — 3074F SYST BP LT 130 MM HG: CPT | Performed by: INTERNAL MEDICINE

## 2025-03-13 PROCEDURE — 73590 X-RAY EXAM OF LOWER LEG: CPT

## 2025-03-13 PROCEDURE — 3078F DIAST BP <80 MM HG: CPT | Performed by: INTERNAL MEDICINE

## 2025-03-13 PROCEDURE — 73552 X-RAY EXAM OF FEMUR 2/>: CPT

## 2025-03-13 PROCEDURE — 1123F ACP DISCUSS/DSCN MKR DOCD: CPT | Performed by: INTERNAL MEDICINE

## 2025-03-13 RX ORDER — OXYCODONE HYDROCHLORIDE 5 MG/1
2.5 TABLET ORAL EVERY 8 HOURS PRN
Qty: 24 TABLET | Refills: 0 | Status: CANCELLED | OUTPATIENT
Start: 2025-03-13 | End: 2025-03-29

## 2025-03-13 NOTE — PROGRESS NOTES
INTERNISTS OF [facility]:  3/18/2025, MRN: [id number]      Lorna Haro is a 64 y.o. female and presents to clinic for Knee Pain (Left knee injury @ [employer]; x 8 days; felt a \"popping\" in patella while attempting to help with a cart. Her body turned and leg stayed straight. Pain is \"sharp\" w/weight bearing. Otherwise stabbing pain. No medication taking but rather elevating it. Pain rated @ 6.)      Subjective:   The patient is a 64-year-old female with history of right sided breast cancer s/p right mastectomy, colon cancer (2019) s/p surgery, b/l clear cell carcinoma s/p b/l partial nephrectomies, pulmonary nodule, chemotherapy induced CMO/CHF, osteopenia, CKD (followed by ), type 2 DM, suspected GALLO, multinodular goiter treated with a thyroidectomy, Covid-19 infection,  pacemaker placement (2021), postoperative hypothyroidism, HLD, GERD, vitamin D deficiency, and HTN     Left Knee Pain: 8 days ago, she felt a popping sensation along her patella while at the [employer], after pivoting.  Pain was sharp and still present with weightbearing.  No relieving factors are known.  Pain is 6/10. She feels like her knee will give out. +Associated with swelling. She is elevating her LLE. Using cold compresses sparingly. +Using a knee brace for comfort.       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

## 2025-03-13 NOTE — PROGRESS NOTES
Lorna Haro presents today for   Chief Complaint   Patient presents with    Knee Pain     Knee injury @ [employer]; x 8 days; felt a \"popping\" in patella while attempting to help with a cart. Her body turned and leg stayed straight. Pain is \"sharp\" w/weight bearing. Otherwise stabbing pain. No medication taking but rather elevating it.           \"Have you been to the ER, urgent care clinic since your last visit?  Hospitalized since your last visit?\"    NO    “Have you seen or consulted any other health care providers outside of [facility] since your last visit?”    NO        “Have you had a pap smear?”    NO    No cervical cancer screening on file

## 2025-03-14 ENCOUNTER — RESULTS FOLLOW-UP (OUTPATIENT)
Facility: HOSPITAL | Age: 65
End: 2025-03-14

## 2025-03-14 NOTE — TELEPHONE ENCOUNTER
Per Dr. De La Cruz:    Please let her know that her x-ray did not show any evidence of fracture.  She has arthritis related findings along her left knee.  Given her left knee pain symptoms, please have her schedule a follow-up visit with orthopedics for evaluation. Dr. Dian De La Cruz [facility] [address], [address] [place] [address] Phone: [phone] Fax: [phone]

## 2025-03-14 NOTE — TELEPHONE ENCOUNTER
Patient returned my call. I informed the patient of Dr De La Cruz's message:  Please let her know that her x-ray did not show any evidence of fracture.  She has arthritis related findings along her left knee.  Given her left knee pain symptoms, please have her schedule a follow-up visit with orthopedics for evaluation.     Patient voiced understanding.    SANTOS Mortensen

## 2025-03-18 PROBLEM — N17.9 AKI (ACUTE KIDNEY INJURY): Status: RESOLVED | Noted: 2021-01-15 | Resolved: 2025-03-18

## 2025-03-18 PROBLEM — R06.09 DOE (DYSPNEA ON EXERTION): Status: RESOLVED | Noted: 2021-01-13 | Resolved: 2025-03-18

## 2025-03-18 PROBLEM — R53.83 FATIGUE: Status: RESOLVED | Noted: 2024-04-02 | Resolved: 2025-03-18

## 2025-03-18 PROBLEM — R06.03 ACUTE RESPIRATORY DISTRESS: Status: RESOLVED | Noted: 2021-01-16 | Resolved: 2025-03-18

## 2025-03-18 PROBLEM — E11.00 HYPEROSMOLAR HYPERGLYCEMIC STATE (HHS) (HCC): Status: RESOLVED | Noted: 2023-11-08 | Resolved: 2025-03-18

## 2025-03-18 ASSESSMENT — ENCOUNTER SYMPTOMS
SHORTNESS OF BREATH: 0
ANAL BLEEDING: 0
EYE PAIN: 0
BLOOD IN STOOL: 0
ABDOMINAL PAIN: 0
SORE THROAT: 0
COUGH: 0

## 2025-03-20 RX ORDER — LANSOPRAZOLE 30 MG/1
30 CAPSULE, DELAYED RELEASE ORAL DAILY
Qty: 90 CAPSULE | Refills: 3 | Status: SHIPPED | OUTPATIENT
Start: 2025-03-20

## 2025-04-10 RX ORDER — ATORVASTATIN CALCIUM 20 MG/1
20 TABLET, FILM COATED ORAL DAILY
Qty: 90 TABLET | Refills: 0 | Status: SHIPPED | OUTPATIENT
Start: 2025-04-10

## 2025-05-02 ENCOUNTER — HOSPITAL ENCOUNTER (OUTPATIENT)
Facility: HOSPITAL | Age: 65
Setting detail: SPECIMEN
Discharge: HOME | End: 2025-05-05

## 2025-05-02 LAB — LABCORP SPECIMEN COLLECTION: NORMAL

## 2025-05-02 PROCEDURE — 99001 SPECIMEN HANDLING PT-LAB: CPT

## 2025-05-20 ENCOUNTER — TELEPHONE (OUTPATIENT)
Facility: CLINIC | Age: 65
End: 2025-05-20

## 2025-05-20 ENCOUNTER — HOSPITAL ENCOUNTER (OUTPATIENT)
Facility: HOSPITAL | Age: 65
Setting detail: SPECIMEN
Discharge: HOME | End: 2025-05-23

## 2025-05-20 ENCOUNTER — OFFICE VISIT (OUTPATIENT)
Facility: CLINIC | Age: 65
End: 2025-05-20
Payer: MEDICARE

## 2025-05-20 VITALS
BODY MASS INDEX: 27.03 KG/M2 | SYSTOLIC BLOOD PRESSURE: 121 MMHG | RESPIRATION RATE: 17 BRPM | WEIGHT: 168.2 LBS | HEIGHT: 66 IN | DIASTOLIC BLOOD PRESSURE: 78 MMHG | TEMPERATURE: 98.4 F | OXYGEN SATURATION: 95 % | HEART RATE: 97 BPM

## 2025-05-20 DIAGNOSIS — N39.0 URINARY TRACT INFECTION WITHOUT HEMATURIA, SITE UNSPECIFIED: ICD-10-CM

## 2025-05-20 DIAGNOSIS — N39.0 URINARY TRACT INFECTION WITHOUT HEMATURIA, SITE UNSPECIFIED: Primary | ICD-10-CM

## 2025-05-20 LAB
BILIRUBIN, URINE, POC: NEGATIVE
BLOOD URINE, POC: NEGATIVE
GLUCOSE URINE, POC: NEGATIVE
KETONES, URINE, POC: NEGATIVE
LABCORP SPECIMEN COLLECTION: NORMAL
LEUKOCYTE ESTERASE, URINE, POC: NORMAL
NITRITE, URINE, POC: NEGATIVE
PH, URINE, POC: 6 (ref 4.6–8)
PROTEIN,URINE, POC: NORMAL
SPECIFIC GRAVITY, URINE, POC: 1.02 (ref 1–1.03)
URINALYSIS CLARITY, POC: CLEAR
URINALYSIS COLOR, POC: NORMAL
UROBILINOGEN, POC: NORMAL MG/DL

## 2025-05-20 PROCEDURE — 81001 URINALYSIS AUTO W/SCOPE: CPT | Performed by: INTERNAL MEDICINE

## 2025-05-20 PROCEDURE — 1125F AMNT PAIN NOTED PAIN PRSNT: CPT | Performed by: INTERNAL MEDICINE

## 2025-05-20 PROCEDURE — 99001 SPECIMEN HANDLING PT-LAB: CPT

## 2025-05-20 PROCEDURE — 3078F DIAST BP <80 MM HG: CPT | Performed by: INTERNAL MEDICINE

## 2025-05-20 PROCEDURE — 1123F ACP DISCUSS/DSCN MKR DOCD: CPT | Performed by: INTERNAL MEDICINE

## 2025-05-20 PROCEDURE — 3074F SYST BP LT 130 MM HG: CPT | Performed by: INTERNAL MEDICINE

## 2025-05-20 PROCEDURE — 99214 OFFICE O/P EST MOD 30 MIN: CPT | Performed by: INTERNAL MEDICINE

## 2025-05-20 RX ORDER — CIPROFLOXACIN 500 MG/1
500 TABLET, FILM COATED ORAL DAILY
Qty: 5 TABLET | Refills: 0 | Status: SHIPPED | OUTPATIENT
Start: 2025-05-20 | End: 2025-05-25

## 2025-05-20 RX ORDER — TIRZEPATIDE 5 MG/.5ML
6 INJECTION, SOLUTION SUBCUTANEOUS
COMMUNITY
Start: 2025-03-26

## 2025-05-20 RX ORDER — AMOXICILLIN AND CLAVULANATE POTASSIUM 500; 125 MG/1; MG/1
1 TABLET, FILM COATED ORAL 3 TIMES DAILY
COMMUNITY
Start: 2025-05-17 | End: 2025-05-20 | Stop reason: ALTCHOICE

## 2025-05-20 RX ORDER — CIPROFLOXACIN 500 MG/1
500 TABLET, FILM COATED ORAL DAILY
Qty: 5 TABLET | Refills: 0 | Status: SHIPPED | OUTPATIENT
Start: 2025-05-20 | End: 2025-05-20 | Stop reason: SDUPTHER

## 2025-05-20 NOTE — PROGRESS NOTES
Lorna Haro presents today for   Chief Complaint   Patient presents with    Urinary Tract Infection     Cloudy urine. Urinary frequency x 4 days; pt currently taking augmentin that was prescribed by  on call           \"Have you been to the ER, urgent care clinic since your last visit?  Hospitalized since your last visit?\"    NO    “Have you seen or consulted any other health care providers outside of [facility] since your last visit?”    NO

## 2025-05-20 NOTE — PROGRESS NOTES
INTERNISTS [facility]:  5/20/2025, MRN: [id number]      Lorna Haro is a 65 y.o. female and presents to clinic for Urinary Tract Infection (Cloudy urine. 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  She has a history of CKD stage IV.  Her estimated GFR was 22 in February.  Followed by nephrology - .  No relieving factors are known. She was prescribed augmentin one 500-125mg tab bid by her nephrology provider on call on Saturday.  She has been taking amoxicillin since Saturday. She has pressure discomfort with urination.+H/o allergic reaction to PCN.     Abdominal pain: yes, described as bloating.  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

## 2025-05-20 NOTE — TELEPHONE ENCOUNTER
Pt had refill for ciprofloxacin (CIPRO) 500 MG tablet sent to wrong pharmacy. This refill should've been sent to [facility]. Please advise.

## 2025-05-21 LAB — SPECIMEN STATUS REPORT: NORMAL

## 2025-05-21 NOTE — TELEPHONE ENCOUNTER
Notified pt that medication has been sent to requested pharmacy of [facility] via phone. No answer. VM left.

## 2025-05-22 LAB — BACTERIA UR CULT: NO GROWTH

## 2025-05-26 PROBLEM — Z85.3 HISTORY OF RIGHT BREAST CANCER: Status: ACTIVE | Noted: 2020-06-27

## 2025-05-26 PROBLEM — C50.919 PRIMARY MALIGNANT NEOPLASM OF FEMALE BREAST (HCC): Status: RESOLVED | Noted: 2024-04-02 | Resolved: 2025-05-26

## 2025-05-26 PROBLEM — Z85.038 HISTORY OF COLON CANCER: Status: ACTIVE | Noted: 2019-07-22

## 2025-05-26 PROBLEM — N28.89 LEFT RENAL MASS: Status: RESOLVED | Noted: 2017-08-18 | Resolved: 2025-05-26

## 2025-05-26 PROBLEM — Z86.16 HISTORY OF SEVERE ACUTE RESPIRATORY SYNDROME CORONAVIRUS 2 (SARS-COV-2) DISEASE: Status: RESOLVED | Noted: 2024-04-02 | Resolved: 2025-05-26

## 2025-05-26 ASSESSMENT — ENCOUNTER SYMPTOMS
ANAL BLEEDING: 0
ABDOMINAL PAIN: 1
BACK PAIN: 1
EYE PAIN: 0
COUGH: 0
SORE THROAT: 0
SHORTNESS OF BREATH: 0
BLOOD IN STOOL: 0

## 2025-05-30 ENCOUNTER — TELEPHONE (OUTPATIENT)
Facility: CLINIC | Age: 65
End: 2025-05-30

## 2025-05-30 NOTE — TELEPHONE ENCOUNTER
Pt was treated for a UTI on 05/20 she took 5 days of cipro  is currently in [facility]   And is having frequency,back pain and burning  she is asking if  can send another   Prescription  for cipro to a pharmacy       [facility]   [address] [address]   Phone# [phone]

## 2025-06-03 RX ORDER — CIPROFLOXACIN 500 MG/1
500 TABLET, FILM COATED ORAL DAILY
Qty: 7 TABLET | Refills: 0 | Status: SHIPPED | OUTPATIENT
Start: 2025-06-03 | End: 2025-06-10

## 2025-06-03 RX ORDER — CIPROFLOXACIN 500 MG/1
500 TABLET, FILM COATED ORAL DAILY
Qty: 7 TABLET | Refills: 0 | Status: SHIPPED | OUTPATIENT
Start: 2025-06-03 | End: 2025-06-03

## 2025-06-03 NOTE — TELEPHONE ENCOUNTER
I have been unsuccessful in reaching her via phone this wk. I will refill her medication but she needs to go to an urgent care facility for persistent sx if her sx do not resolve with this second course of abx.      Dr. Dian De La Cruz  [facility] of [address], [address]  [place] [address]  Phone: [phone]  Fax: [phone]

## 2025-06-19 DIAGNOSIS — E03.9 HYPOTHYROIDISM, UNSPECIFIED TYPE: ICD-10-CM

## 2025-06-19 RX ORDER — LEVOTHYROXINE SODIUM 112 UG/1
112 TABLET ORAL DAILY
Qty: 90 TABLET | Refills: 2 | Status: SHIPPED | OUTPATIENT
Start: 2025-06-19

## 2025-07-17 ENCOUNTER — TELEPHONE (OUTPATIENT)
Facility: CLINIC | Age: 65
End: 2025-07-17

## 2025-07-17 NOTE — TELEPHONE ENCOUNTER
Refill request via fax     Medication: atorvastatin 20 mg  Quantity: 90  Pharmacy: [facility].      PCP: Dian De La Cruz MD    LAST OFFICE VISIT: 02/13/2025      Future Appointments   Date Time Provider Department Center   8/13/2025 10:45 AM IOC LAB VISIT [facility] DEP   8/20/2025 10:20 AM Dian De La Cruz MD [facility] DEP

## 2025-07-18 RX ORDER — ATORVASTATIN CALCIUM 20 MG/1
20 TABLET, FILM COATED ORAL DAILY
Qty: 90 TABLET | Refills: 1 | Status: SHIPPED | OUTPATIENT
Start: 2025-07-18

## (undated) DEVICE — SLEEVE COMPR STD 12 IN FOR 165IN CALF COMFORT VENODYNE SYS

## (undated) DEVICE — SINGLE USE SUCTION VALVE MAJ-209: Brand: SINGLE USE SUCTION VALVE (STERILE)

## (undated) DEVICE — BLADE, TONGUE, 6", STERILE: Brand: MEDLINE

## (undated) DEVICE — GOWN PLASTIC FILM THMBHKS UNIV BLUE: Brand: CARDINAL HEALTH

## (undated) DEVICE — NEBULIZER,KIT,T-MOUTHPIECE,6"RESER,7'TUB: Brand: MEDLINE

## (undated) DEVICE — BE 105-8 BRONCHOSCOPE SWIVEL - 15MM ID/22MM OD (PATIENT PORT) X15MM OD (EQUIPMENT PORT). REUSABLE.  FITS COMPONENTS OF ADULT VENTILATOR CIRCUITS.  MOLDED OF POLYETHERIMIDE. INCLUDES TWO SILICONE RUBBER CAPS; ONE CAP ALLOWS FOR THE USE OF A SUCTIONING CATHETER WHILE THE OTHER CAP ALLOWS FOR THE USE OF A FIBER-OPTIC BRONCHOSCOPE WITHOUT SIGNIFICANT LOSS OF PEEP.: Brand: BE 105-8 BRONCHOSCOPE SWIVEL

## (undated) DEVICE — NEEDLE ASPIR 22GA L700MM US GUID TREAT DST END FOR

## (undated) DEVICE — LINER SUCT CANSTR 3000CC PLAS SFT PRE ASSEMB W/OUT TBNG W/

## (undated) DEVICE — FORCEPS BX L240CM JAW DIA2.8MM L CAP W/ NDL MIC MESH TOOTH

## (undated) DEVICE — CANNULA ORIG TL CLR W FOAM CUSHIONS AND 14FT SUPL TB 3 CHN

## (undated) DEVICE — STERILE POLYISOPRENE POWDER-FREE SURGICAL GLOVES: Brand: PROTEXIS

## (undated) DEVICE — NEEDLE ASPIR INNR 21GA OUTER 19GA L3X15MM TRNSBRONCH

## (undated) DEVICE — Device

## (undated) DEVICE — CATHETER SUCT TR FL TIP 14FR W/ O CTRL

## (undated) DEVICE — SET ADMIN L104IN 20 GTT GRAV RLER CLMP SMRT SITE NDL FREE

## (undated) DEVICE — SYRINGE MED 10ML SLIP TIP BLNT FILL AND LUERLOCK DISP

## (undated) DEVICE — SOLUTION IRRIG 1000ML H2O STRL BLT

## (undated) DEVICE — APPLICATOR FBR TIP L6IN COT TIP WOOD SHFT SWAB 2000 PER CA

## (undated) DEVICE — ENDOSCOPY PUMP TUBING/ CAP SET: Brand: ERBE

## (undated) DEVICE — CANNULA NSL AD TBNG L14FT STD PVC O2 CRV CONN NONFLARED NSL

## (undated) DEVICE — DRAPE TWL SURG 16X26IN BLU ORB04] ALLCARE INC]

## (undated) DEVICE — FORCEPS BX L240CM JAW DIA2.4MM ORNG L CAP W/ NDL DISP RAD

## (undated) DEVICE — CONTAINER FORMALIN PREFILLED 10% NBF 40ML

## (undated) DEVICE — SINGLE USE BIOPSY VALVE MAJ-210: Brand: SINGLE USE BIOPSY VALVE (STERILE)

## (undated) DEVICE — BASIN EMSIS 16OZ GRAPHITE PLAS KID SHP MOLD GRAD FOR ORAL

## (undated) DEVICE — SYRINGE MED 25GA 3ML L5/8IN SUBQ PLAS W/ DETACH NDL SFTY

## (undated) DEVICE — SYRINGE MED 50ML LUERSLIP TIP

## (undated) DEVICE — BITE BLOCK ENDOSCP UNIV AD 6 TO 9.4 MM

## (undated) DEVICE — NEEDLE ASPIR TRNSBRONCH 1.8 MM 21 GAX15 MM 130 CM EXCELON

## (undated) DEVICE — 1860 HEALTH CARE N95 MASK, 20EACH/BOX  6 BX/C: Brand: 3M™

## (undated) DEVICE — GAUZE,SPONGE,4"X4",16PLY,STRL,LF,10/TRAY: Brand: MEDLINE

## (undated) DEVICE — MASK SURG REG ORNG LEV 3 SFTY SEAL 4 LAYR SFT INNR LINING

## (undated) DEVICE — SYRINGE MED 10ML LUERLOCK TIP W/O SFTY DISP

## (undated) DEVICE — FORCEPS BX L100CM DIA1.8MM WRK CHN 2MM PULM S STL RAD JAW 4

## (undated) DEVICE — UNDERPAD INCONT W23XL36IN STD BLU POLYPR BK FLUF SFT

## (undated) DEVICE — YANKAUER,SMOOTH HANDLE,HIGH CAPACITY: Brand: MEDLINE INDUSTRIES, INC.

## (undated) DEVICE — MEDI-VAC NON-CONDUCTIVE SUCTION TUBING: Brand: CARDINAL HEALTH

## (undated) DEVICE — SYRINGE 20ML LL S/C 50

## (undated) DEVICE — MINI-FORCEPS: Brand: COREDX™

## (undated) DEVICE — SNARE POLYP M W27MMXL240CM OVL STIFF DISP CAPTIVATOR

## (undated) DEVICE — BRUSH CYTO L140CM DIA1.5MM WRK CHN 2MM BRIST SHTH RNG HNDL

## (undated) DEVICE — 1860S HEALTH CARE RESPIRATOR N95 120EA/C: Brand: 3M™